# Patient Record
Sex: FEMALE | Race: OTHER | HISPANIC OR LATINO | Employment: UNEMPLOYED | URBAN - METROPOLITAN AREA
[De-identification: names, ages, dates, MRNs, and addresses within clinical notes are randomized per-mention and may not be internally consistent; named-entity substitution may affect disease eponyms.]

---

## 2017-01-11 ENCOUNTER — GENERIC CONVERSION - ENCOUNTER (OUTPATIENT)
Dept: OTHER | Facility: OTHER | Age: 27
End: 2017-01-11

## 2017-01-11 ENCOUNTER — ALLSCRIPTS OFFICE VISIT (OUTPATIENT)
Dept: PERINATAL CARE | Facility: OTHER | Age: 27
End: 2017-01-11
Payer: COMMERCIAL

## 2017-01-11 PROCEDURE — 76817 TRANSVAGINAL US OBSTETRIC: CPT | Performed by: OBSTETRICS & GYNECOLOGY

## 2017-01-11 PROCEDURE — 76815 OB US LIMITED FETUS(S): CPT | Performed by: OBSTETRICS & GYNECOLOGY

## 2017-01-25 ENCOUNTER — GENERIC CONVERSION - ENCOUNTER (OUTPATIENT)
Dept: OTHER | Facility: OTHER | Age: 27
End: 2017-01-25

## 2017-01-25 ENCOUNTER — ALLSCRIPTS OFFICE VISIT (OUTPATIENT)
Dept: PERINATAL CARE | Facility: CLINIC | Age: 27
End: 2017-01-25
Payer: COMMERCIAL

## 2017-01-25 PROCEDURE — 76817 TRANSVAGINAL US OBSTETRIC: CPT | Performed by: OBSTETRICS & GYNECOLOGY

## 2017-01-25 PROCEDURE — 76815 OB US LIMITED FETUS(S): CPT | Performed by: OBSTETRICS & GYNECOLOGY

## 2017-02-08 ENCOUNTER — GENERIC CONVERSION - ENCOUNTER (OUTPATIENT)
Dept: OTHER | Facility: OTHER | Age: 27
End: 2017-02-08

## 2017-02-08 ENCOUNTER — ALLSCRIPTS OFFICE VISIT (OUTPATIENT)
Dept: PERINATAL CARE | Facility: OTHER | Age: 27
End: 2017-02-08
Payer: COMMERCIAL

## 2017-02-08 PROCEDURE — 76805 OB US >/= 14 WKS SNGL FETUS: CPT | Performed by: OBSTETRICS & GYNECOLOGY

## 2017-02-08 PROCEDURE — 76817 TRANSVAGINAL US OBSTETRIC: CPT | Performed by: OBSTETRICS & GYNECOLOGY

## 2017-02-22 ENCOUNTER — GENERIC CONVERSION - ENCOUNTER (OUTPATIENT)
Dept: OTHER | Facility: OTHER | Age: 27
End: 2017-02-22

## 2017-02-22 ENCOUNTER — ALLSCRIPTS OFFICE VISIT (OUTPATIENT)
Dept: PERINATAL CARE | Facility: CLINIC | Age: 27
End: 2017-02-22
Payer: COMMERCIAL

## 2017-02-22 PROCEDURE — 76816 OB US FOLLOW-UP PER FETUS: CPT | Performed by: OBSTETRICS & GYNECOLOGY

## 2017-02-22 PROCEDURE — 76817 TRANSVAGINAL US OBSTETRIC: CPT | Performed by: OBSTETRICS & GYNECOLOGY

## 2017-03-01 ENCOUNTER — GENERIC CONVERSION - ENCOUNTER (OUTPATIENT)
Dept: OTHER | Facility: OTHER | Age: 27
End: 2017-03-01

## 2017-03-01 ENCOUNTER — ALLSCRIPTS OFFICE VISIT (OUTPATIENT)
Dept: PERINATAL CARE | Facility: OTHER | Age: 27
End: 2017-03-01
Payer: COMMERCIAL

## 2017-03-01 PROCEDURE — 76817 TRANSVAGINAL US OBSTETRIC: CPT | Performed by: OBSTETRICS & GYNECOLOGY

## 2017-03-29 ENCOUNTER — ALLSCRIPTS OFFICE VISIT (OUTPATIENT)
Dept: PERINATAL CARE | Facility: OTHER | Age: 27
End: 2017-03-29
Payer: COMMERCIAL

## 2017-03-29 ENCOUNTER — GENERIC CONVERSION - ENCOUNTER (OUTPATIENT)
Dept: OTHER | Facility: OTHER | Age: 27
End: 2017-03-29

## 2017-03-29 PROCEDURE — 76816 OB US FOLLOW-UP PER FETUS: CPT | Performed by: OBSTETRICS & GYNECOLOGY

## 2017-05-10 ENCOUNTER — ALLSCRIPTS OFFICE VISIT (OUTPATIENT)
Dept: PERINATAL CARE | Facility: OTHER | Age: 27
End: 2017-05-10
Payer: COMMERCIAL

## 2017-05-10 ENCOUNTER — GENERIC CONVERSION - ENCOUNTER (OUTPATIENT)
Dept: OTHER | Facility: OTHER | Age: 27
End: 2017-05-10

## 2017-05-10 PROCEDURE — 76816 OB US FOLLOW-UP PER FETUS: CPT | Performed by: OBSTETRICS & GYNECOLOGY

## 2018-01-10 NOTE — PROGRESS NOTES
2016         RE: Raysa Hudson                              To: [address] TEOFILO Ventura    MR#: [id number]                                    [address] [place] [address] Drive   :  [facility], [address]   #: *********                                    Fax: Tacos Weinerh: [phone]   (Exam #: [id number])      The LMP of this 27year old,  G3, P1-1-0-2 patient was SEP 15 2016, giving   her an NATIVIDAD of 2017 and a current gestational age of 11 weeks 6 days   by dates  A sonographic examination was performed on 2016 using   real time equipment  The ultrasound examination was performed using   abdominal & vaginal techniques  The patient has a BMI of 28 3  Her blood   pressure today was 119/67  Earliest ultrasound found in her record: 16 8w4d  17 NATIVIDAD   Lamar reports she is on prenatal vitamins and reports she develops a   rash when she takes Demerol  She denies any past medical or surgical   history or the use of any cigarettes, alcohol or illicit drugs  In her MFM   consult in her last pregnancy she reported she had an abnormal Pap smear   in  and herpes diagnosed in   She's had 2 prior pregnancies that   have had complications  Her first son delivered in [facility] at 32 weeks   24 hours after PPROM  She required a  section but she is unsure   why and that baby required a 30 day NICU stay and then developed a Wilms   tumor at 21 months of age which had to be removed  She states he is   currently healthy  Her second son delivered at 42 weeks by repeat     In that pregnancy her son was diagnosed with IUGR with a normal   ANDRE and dopplers at 37w1d and delivery was recommended  She was induced   only to require a CS for nonreassuring fetal testing  This child Ciro Patel 12/[birth year] Is followed by Dr Ken Jo in the pediatric   Department here at [facility]   He is being followed for renal tubular   acidosis and he has a difficult time feeding so he has a gastrostomy tube  He had a formal consult at [facility] and his mother is waiting on the final   results  Her family history is significant for diabetes in her maternal   and paternal grandmothers  She denies any family history of hypertension   or thrombosis  I utilized Sharyn to help in the interpretation during this   consult  Multiple longitudinal and transverse sections revealed a rubalcava   intrauterine pregnancy  A normal gestational sac was documented  A normal fetal pole was   visualized  Cardiac motion was observed at 169 bpm  The yolk sac was seen,   measuring 0 20 cm  INDICATIONS      confirm gestational age   viability   Previous C/S x 2   short interval pregnancy      Exam Types      Level I      MEASUREMENTS (* Included In Average GA)      CRL              2 0 cm        8 weeks 4 days *      THE AVERAGE GESTATIONAL AGE is 8 weeks 4 days +/- 5 days  ANATOMY COMMENTS      Anatomic detail is extremely limited at this gestational age  However, a   discrete fetal pole with cardiac and fetal body motion was documented  The yolk sac was clearly seen, and the gestational sac is normal in   appearance and located in the fundus of the uterus  No gross abnormalities   were noted on this examination  Free fluid is not seen in the posterior   cul-de-sac  There is no suspicion of a subchorionic hematoma  IMPRESSION      Rubalcava IUP   8 weeks and 4 days by this ultrasound  (NATIVIDAD=2017)   Regular fetal heart rate of 169 bpm      GENERAL COMMENT      OFFICE CONSULT      As per your request, a consultation was performed on your patient for the   following indication: prior  delivery,  prior child with Wilms   tumor and a prior child with renal tubular acidosis that developed   postnatally and is being followed at [facility] and [facility]  Patient also   has two prior C-sections        The patient was informed of the findings and counseled about the   limitations of the exam in detecting all forms of fetal congenital   abnormalities  She denies any vaginal bleeding or uterine cramping/contractions  Follow up recommended:   1  Recommend offering [employer] again in this pregnancy  2  Recommend offering transvaginal scans for serial cervix lengths between   16 and 24 weeks and these can be repeated every 2 weeks  3  Recommend an anatomy scan at 20 weeks and growth scans at 28 and 34   weeks  4  She has follow-up for her first OB visit where she will discuss with   her OB provider about the options for genetic screening  5  Recommend obtaining the report from [facility] on her son when available to   see if there is a family syndrome that is associated with the complicated   histories of her prior children that includes the Wilms Tumor in her first   son and the Renal tubular acidosis in her second son  The majority of time (greater then 50%) was spent on counseling and   coordination of care of this patient and /or family member  Approximate   face to face time was 30 minutes  Rachel Goodpasture, R D M S Alice Husky, M D     Maternal-Fetal Medicine   Electronically signed 11/17/16 13:48

## 2018-01-11 NOTE — PROGRESS NOTES
2017         RE: Yasmin Mclain                              To: [address] TEOFILO Duenas    MR#: [id number]                                    [address] [place] [address] Drive   :  [place] [address]   ENC: [phone]                             Fax: [phone]   (Exam #: [id number])      The LMP of this 27year old,  G3, P1-1-0-2 patient was SEP 13 2016, giving   her an NATIVIDAD of 2017 and a current gestational age of 25 weeks 6 days   by dates  A sonographic examination was performed on 2017 using   real time equipment  The ultrasound examination was performed using   abdominal & vaginal techniques  The patient has a BMI of 27 8  Her blood   pressure today was 104/67  Earliest ultrasound found in her record: 16 8w4d  17 NATIVIDAD      Cardiac motion was observed at 147 bpm       INDICATIONS      previous  delivery   missed anatomy follow up   previous IUGR      Exam Types      Transvaginal      RESULTS      Fetus # 1 of 1   Breech presentation   Placenta Location = Anterior   No placenta previa   Placenta Grade = I      AMNIOTIC FLUID         Largest Vertical Pocket = 5 4 cm   Amniotic Fluid: Normal      CERVICAL EVALUATION      The cervix appeared normal (Ultrasound Examination)  SUPINE      Cervical Length: 2 67 cm      OTHER TEST RESULTS           Funneling?: No             Dynamic Changes?: No        Resp  To TFP?: No      ANATOMY DETAILS      Visualized Appearing Sonographically Normal:   HEART: (Four Chamber View, Proximal Left Outflow, Proximal Right Outflow,   Short Axis of Greater Vessels, Ductal Arch, Cardiac Axis, Cardiac Position)      ANATOMY COMMENTS      The prior fetal anatomic survey was limited the area of the short axis and   ductal arch  These anatomic views were seen today as sonographically   normal within the inherent limitations of fetal ultrasound        IMPRESSION      Rubalcava IUP   Breech presentation   Regular fetal heart rate of 147 bpm   Anterior placenta   No placenta previa      GENERAL COMMENT      On exam, the patient appears well, in no acute distress, and her abdomen   is nontender  A transvaginal ultrasound was performed to assess the cervix, which was   not seen well transabdominally  The cervical length was 2 67 centimeters,   which is normal for the current gestational age  There was no significant   funneling or dynamic changes appreciated  Given that the cervical length   is less than 3 cm, I recommend the patient return in one week for repeat   cervical length evaluation  If at that point, the cervical length is less   than 2 5 cm, cervical cerclage is recommended  Thank you very much for allowing us to participate in the care of this   very nice patient  Should you have any questions, please do not hesitate   to contact our office  Please note, in addition to the time spent discussing the results of the   ultrasound, I spent approximately 10 minutes of face-to-face time with the   patient, greater than 50% of which was spent in counseling and the   coordination of care for this patient  Portions of the record may have been created with voice recognition   software  Occasional wrong word or "sound a like" substitutions may have   occurred due to the inherent limitations of voice recognition software  Read the chart carefully and recognize, using context, where substitutions   have occurred  WENDY Hernandez M D     Electronically signed 02/22/17 11:33

## 2018-01-12 VITALS
SYSTOLIC BLOOD PRESSURE: 104 MMHG | WEIGHT: 147 LBS | HEIGHT: 61 IN | BODY MASS INDEX: 27.75 KG/M2 | DIASTOLIC BLOOD PRESSURE: 67 MMHG

## 2018-01-12 NOTE — PROGRESS NOTES
MAY 10 2017         RE: Moises Laughlin                              To: TEOFILO Rooney    MR#: [id number]                                    [address]   : [facility], [address]   SS#: *********                                    Fax: Natali Roberts: [phone]   (Exam #: [id number])      The LMP of this 27year old,  G3, P1-1-0-2 patient was SEP 15 2016, giving   her an NATIVIDAD of 2017 and a current gestational age of 29 weeks 6 days   by dates  A sonographic examination was performed on MAY 10 2017 using   real time equipment  The ultrasound examination was performed using   abdominal technique  The patient has a BMI of 29 9  Her blood pressure   today was 105/64  Earliest ultrasound found in her record: 16 8w4d  17 NATIVIDAD      Lamar has no complaints today  She reports regular fetal movements and   denies problems related to hypertension, gestational diabetes,    labor, or vaginal bleeding        Cardiac motion was observed at 139 bpm       INDICATIONS      previous  delivery   fetal growth   short interval pregnancy      Exam Types      Level I      RESULTS      Fetus # 1 of 1   Vertex presentation   Placenta Location = Anterior   No placenta previa   Placenta Grade = II      MEASUREMENTS (* Included In Average GA)      AC              29 5 cm        33 weeks 4 days* (43%)   BPD              7 6 cm        30 weeks 4 days* (<5%)   HC              29 6 cm        32 weeks 3 days* (9%)   Femur            5 8 cm        30 weeks 1 day * (<5%)      Humerus          5 3 cm        30 weeks 5 days  (<5%)   Radius           4 3 cm        31 weeks 3 days   Ulna             4 8 cm        30 weeks 4 days   Tibia            5 0 cm        29 weeks 6 days  (<5%)   Fibula           5 0 cm        29 weeks 1 day      Cerebellum       4 4 cm        35 weeks 1 day      HC/AC           1 01   FL/AC 0 20   FL/BPD          0 76   EFW (Ac/Fl/Hc)  1947 grams - 4 lbs 5 oz                 (21%)      THE AVERAGE GESTATIONAL AGE is 31 weeks 4 days +/- 18 days  AMNIOTIC FLUID      Q1: 3 7      Q2: 6 6      Q3: 3 0      Q4: 3 8   ANDRE Total = 17 2 cm   Amniotic Fluid: Normal      ANATOMY DETAILS      Visualized Appearing Sonographically Normal:   HEAD: (Calvarium, BPD Level, Lateral Ventricles, Cerebellum);      FACE/NECK: (Nose/Lips);    TH  CAV : (Diaphragm); HEART: ([employer], Cardiac Axis, Cardiac Position);    STOMACH, RIGHT KIDNEY, LEFT   KIDNEY, BLADDER, PLACENTA      Visualized:   HEART: (Proximal Left Outflow, Proximal Right Outflow)      Not Visualized:   HEAD: (Cavum)      IMPRESSION      Rubalcava IUP   31 weeks and 4 days by this ultrasound  (NATIVIDAD=JUL 8 2017)   Vertex presentation   Regular fetal heart rate of 139 bpm   Anterior placenta   No placenta previa      GENERAL COMMENT      No fetal structural abnormality is identified on the Level I survey today  The fetal long bone measurements are mildly shortened, though the bone   densities and angulations appear normal  The overall estimated fetal   weight is normal for this gestational age  The placenta and amniotic fluid   volume appear normal       Today's ultrasound findings and suggested follow-up were discussed in   detail with aLmar  Most likely, the mildly shortened long bones   represent normal genetic growth variation, though a non-lethal skeletal   dysplasia cannot be ruled out with certainty  She will return to the   [facility]  at [facility] in 4 weeks for a final   assessment of interval growth  Daily third trimester fetal kick counting   was discussed at the visit today  The face to face time, in addition to time spent discussing ultrasound   results, was approximately 10 minutes, greater than 50% of which was spent   during counseling and coordination of care        Almyra Sandifer, R D M S  TEOFILO Saravia     Maternal-Fetal Medicine   Electronically signed 05/10/17 11:09

## 2018-01-13 VITALS
HEIGHT: 61 IN | BODY MASS INDEX: 28.51 KG/M2 | SYSTOLIC BLOOD PRESSURE: 105 MMHG | WEIGHT: 151 LBS | DIASTOLIC BLOOD PRESSURE: 57 MMHG

## 2018-01-13 VITALS
DIASTOLIC BLOOD PRESSURE: 64 MMHG | HEIGHT: 61 IN | WEIGHT: 158.6 LBS | SYSTOLIC BLOOD PRESSURE: 105 MMHG | BODY MASS INDEX: 29.94 KG/M2

## 2018-01-13 VITALS
WEIGHT: 144 LBS | HEIGHT: 60 IN | DIASTOLIC BLOOD PRESSURE: 53 MMHG | SYSTOLIC BLOOD PRESSURE: 100 MMHG | BODY MASS INDEX: 28.27 KG/M2

## 2018-01-13 NOTE — PROGRESS NOTES
MAR 1 2017         RE: Maurice Benitez                              To: TEOFILO Hernandez    MR#: [id number]                                    [address]   : AUG [address] [facility]   #: *********                                    Fax: Jhoan Ny: [phone]   (Exam #: [id number])      The LMP of this 27year old,  G3, P1-1-0-2 patient was SEP 15 2016, giving   her an NATIVIDAD of 2017 and a current gestational age of 20 weeks 6 days   by dates  A sonographic examination was performed on MAR 1 2017 using real   time equipment  The ultrasound examination was performed using abdominal &   vaginal techniques  The patient has a BMI of 28 3  Her blood pressure   today was 111/69  Earliest ultrasound found in her record: 16 8w4d  17 NATIVIDAD      Cardiac motion was observed at 135 bpm       INDICATIONS      previous  delivery      Exam Types      Transvaginal      RESULTS      Fetus # 1 of 1   Breech presentation   Placenta Location = Anterior   No placenta previa   Placenta Grade = I      AMNIOTIC FLUID         Largest Vertical Pocket = 6 0 cm   Amniotic Fluid: Normal      CERVICAL EVALUATION      The cervix appeared normal (Ultrasound Examination)  SUPINE      Cervical Length: 2 80 cm      OTHER TEST RESULTS           Funneling?: No             Dynamic Changes?: No        Resp  To TFP?: No                      Debris?: No      IMPRESSION      Rubalcava IUP   Breech presentation   Regular fetal heart rate of 135 bpm   Anterior placenta   No placenta previa      GENERAL COMMENT      The cervix is normal in appearance by transvaginal sonography  The   cervical length is normal   Cervical debris is not present  Cervical   funneling is not present  Neither provocative nor dynamic change is   appreciated        RECOMMENDATION      Transvaginal cervical length: As indicated   Growth Ultrasound: 4 Weeks WENDY Silverman M D     Maternal-Fetal Medicine   Electronically signed 03/01/17 11:02

## 2018-01-14 VITALS
DIASTOLIC BLOOD PRESSURE: 69 MMHG | BODY MASS INDEX: 28.32 KG/M2 | WEIGHT: 150.03 LBS | SYSTOLIC BLOOD PRESSURE: 111 MMHG | HEIGHT: 61 IN

## 2018-01-14 VITALS
BODY MASS INDEX: 27.76 KG/M2 | HEIGHT: 60 IN | SYSTOLIC BLOOD PRESSURE: 102 MMHG | WEIGHT: 141.4 LBS | DIASTOLIC BLOOD PRESSURE: 54 MMHG

## 2018-01-14 VITALS
WEIGHT: 145.03 LBS | SYSTOLIC BLOOD PRESSURE: 117 MMHG | DIASTOLIC BLOOD PRESSURE: 67 MMHG | HEIGHT: 61 IN | BODY MASS INDEX: 27.38 KG/M2

## 2018-01-15 NOTE — PROGRESS NOTES
2017         RE: Yolanda New                              To: All Oneal, M D    MR#: [id number]                                    [address]   : A[facility] [address], [address]   ENC: [phone]                             Fax: [phone]   (Exam #: [id number])      The LMP of this 27year old,  G3, P1-1-0-2 patient was SEP 13 2016, giving   her an NATIVIDAD of 2017 and a current gestational age of 22 weeks 6 days   by dates  A sonographic examination was performed on 2017 using   real time equipment  The ultrasound examination was performed using   abdominal & vaginal techniques  The patient has a BMI of 28 1  Her blood   pressure today was 100/53  Earliest ultrasound found in her record: 16 8w4d  17 NATIVIDAD      Cardiac motion was observed at 148 bpm       INDICATIONS      previous  delivery      Exam Types      Transvaginal      RESULTS      Fetus # 1 of 1   Transverse presentation   Placenta Location = Anterior   No placenta previa   Placenta Grade = I      AMNIOTIC FLUID         Largest Vertical Pocket = 5 0 cm   Amniotic Fluid: Normal      CERVICAL EVALUATION      The cervix appeared normal (Ultrasound Examination)  SUPINE      Cervical Length: 3 50 cm      OTHER TEST RESULTS           Funneling?: No             Dynamic Changes?: No        Resp  To TFP?: No      IMPRESSION      Rubalcava IUP   Transverse presentation   Regular fetal heart rate of 148 bpm   Anterior placenta   No placenta previa      GENERAL COMMENT      On exam, the patient appears well, in no acute distress, and her abdomen   is nontender  A transvaginal ultrasound was performed to assess the cervix, which was   not seen well transabdominally  The cervical length was 3 5 centimeters,   which is normal for the current gestational age  There was no significant   funneling or dynamic changes appreciated        She is scheduled to return in 2 weeks for a fetal anatomic survey and   cervical length screening  Continues to utilize 17 alpha   hydroxyprogesterone in the office  Thank you very much for allowing us to participate in the care of this   very nice patient  Should you have any questions, please do not hesitate   to contact our office  WENDY Romero M D     Electronically signed 01/25/17 10:47

## 2018-01-15 NOTE — PROGRESS NOTES
MAR 29 2017         RE: Emma Negron                              To: TEOFILO Cadet    MR#: [id number]                                    [address]   : AUG [address] [facility] [address]   SS#: *********                                    Fax: Risa Millin:RBNJS   (Exam #: [id number])      The LMP of this 27year old,  G3, P1-1-0-2 patient was SEP 15 2016, giving   her an NATIVIDAD of 2017 and a current gestational age of 32 weeks 6 days   by dates  A sonographic examination was performed on MAR 29 2017 using   real time equipment  The ultrasound examination was performed using   abdominal technique  The patient has a BMI of 28 5  Her blood pressure   today was 105/57        Earliest ultrasound found in her record: 16 8w4d  17 NATIVIDAD      Cardiac motion was observed at 130 bpm       INDICATIONS      previous  delivery   fetal growth   short interval pregnancy      Exam Types      Level I      RESULTS      Fetus # 1 of 1   Vertex presentation   Fetal growth appeared normal   Placenta Location = Anterior   Placenta Grade = I      MEASUREMENTS (* Included In Average GA)      AC              22 2 cm        26 weeks 4 days* (21%)   BPD              6 9 cm        27 weeks 6 days* (39%)   HC              25 9 cm        27 weeks 6 days* (36%)   Femur            4 7 cm        25 weeks 5 days* (<5%)      Humerus          4 3 cm        25 weeks 4 days  (<5%)   Radius           3 5 cm        25 weeks 5 days   Ulna             4 0 cm        25 weeks 6 days   Tibia            4 0 cm        25 weeks 2 days  (<5%)   Fibula           4 1 cm        24 weeks 5 days      Cerebellum       3 3 cm        29 weeks 5 days      HC/AC           1 17   FL/AC           0 21   FL/BPD          0 68   Ceph Index      0 75   EFW (Ac/Fl/Hc)   941 grams - 2 lbs 1 oz                 (28%)      THE AVERAGE GESTATIONAL AGE is 27 weeks 0 days +/- 14 days       AMNIOTIC FLUID      Q1: 3 4      Q2: 4 2      Q3: 3 7      Q4: 3 0   ANDRE Total = 14 3 cm      ANATOMY DETAILS      Visualized Appearing Sonographically Normal:   HEAD: (Calvarium, BPD Level, Cavum, Lateral Ventricles, Choroid Plexus,   Cerebellum, Cisterna Magna);    TH  CAV  : (Lungs, Diaphragm); HEART:   (Four Chamber View, Proximal Right Outflow, 3VV, Interventricular Septum,   Interatrial Septum, Cardiac Axis, Cardiac Position);    STOMACH, RIGHT   KIDNEY, LEFT KIDNEY, BLADDER, PLACENTA      Visualized:   HEART: (Proximal Left Outflow)      IMPRESSION      Rubalcava IUP   27 weeks and 0 days by this ultrasound  (NATIVIDAD=2017)   Vertex presentation   Fetal growth appeared normal   Regular fetal heart rate of 130 bpm   Anterior placenta      GENERAL COMMENT      On exam today the patient appears well, in no acute distress, and denies   any complaints  Her abdomen is non-tender  There has been appropriate interval fetal growth  The extremities appear   to be somewhat shortened approximately 2 weeks behind estimated   gestational age  The patient herself short and her prior full-term   delivery was a small baby who weighed 4-1/2 pounds  This is likely   consistent with constitutional growth however follow-up growth ultrasound   are recommended  The echogenicity and structure of the bones appear to be   normal   Good fetal movement and tone are seen  The amniotic fluid volume   appears normal   The placenta is anterior and it appears sonographically   normal   The anatomic structures listed above could not be optimally   imaged today because of fetal position; however, these structures were   seen on a prior ultrasound and appeared sonographically normal   The   patient was informed of today's findings and all of her questions were   answered  The limitations of ultrasound were reviewed with the patient   labor precautions and fetal kick counts were reviewed        We discussed appropriate follow-up in detail and I recommend the patient   return      Thank you very much for allowing us to participate in the care of this   very nice patient  Should you have any questions, please do not hesitate   to contact our office  Please note, in addition to the time spent discussing the results of the   ultrasound, I spent approximately 10 minutes of face-to-face time with the   patient, greater than 50% of which was spent in counseling and the   coordination of care for this patient  WENDY Caraballo M D     Electronically signed 03/29/17 13:47

## 2018-01-16 NOTE — PROGRESS NOTES
2017         RE: Michelle Mcintyre                              To: TEOFILO Castro    MR#: [id number]                                    [address]   : AUG [address] [facility]  [address]   #: *********                                    Fax: Jesse Mariar: [phone]   (Exam #: [id number])      The LMP of this 27year old,  G3, P1-1-0-2 patient was SEP 15 2016, giving   her an NATIVIDAD of 2017 and a current gestational age of 12 weeks 6 days   by dates  A sonographic examination was performed on 2017 using   real time equipment  The ultrasound examination was performed using   abdominal & vaginal techniques  The patient has a BMI of 27 5  Her blood   pressure today was 102/54  Earliest ultrasound found in her record: 16 8w4d  17 NATIVIDAD      Nidachy has no complaints  She denies abdominal or pelvic pain, vaginal   bleeding, mucoid vaginal discharge, or suspected PPROM  Cardiac motion was observed at 144 bpm       INDICATIONS      previous  delivery      Exam Types      Transvaginal      RESULTS      Fetus # 1 of 1   Breech presentation   Placenta Location = Anterior   No placenta previa   Placenta Grade = 0      AMNIOTIC FLUID         Largest Vertical Pocket = 3 8 cm   Amniotic Fluid: Normal      CERVICAL EVALUATION      The cervix appeared normal (Ultrasound Examination)  SUPINE      Cervical Length: 3 20 cm      OTHER TEST RESULTS           Funneling?: No             Dynamic Changes?: No        Resp  To TFP?: No                      Debris?: No      IMPRESSION      Rubalcava IUP   Breech presentation   Regular fetal heart rate of 144 bpm   Anterior placenta   No placenta previa      GENERAL COMMENT      The cervix is normal in appearance by transvaginal sonography  The   cervical length is normal   Cervical debris is not present  Cervical   funneling is not present    Neither provocative nor dynamic change is   appreciated  Today's ultrasound findings and suggested follow-up were discussed in   detail with Lamar  She will be followed in the [address] [place] [address] with serial transvaginal ultrasound evaluations,   every 2 weeks, until 24 weeks gestation for cervical surveillance given   her history of a prior spontaneous  birth  Cervical cerclage is   recommended with cervical shortening below 25 mm prior to 24 weeks   gestation  Level II ultrasound evaluation will be performed at 20 weeks   gestation  Weekly IM 17-OHPC, 250 mg, is recommended which will reduce her   risk for recurrence of spontaneous  birth by about 30%  Continuation of weekly 17-OHPC is recommended through 36 completed weeks   gestation  The face to face time, in addition to time spent discussing ultrasound   results, was approximately 10 minutes, greater than 50% of which was spent   during counseling and coordination of care  WENDY Edouard M D     Maternal-Fetal Medicine   Electronically signed 17 11:07

## 2018-01-18 NOTE — PROGRESS NOTES
2017         RE: Raysa Hudson                              To: TEOFILO Avalos    MR#: [id number]                                    [address]   :  [facility] [address]   #: *********                                    Fax: Tacos Perch: [phone]:[id number]   (Exam #: [id number])      The LMP of this 27year old,  G3, P1-1-0-2 patient was SEP 15 2016, giving   her an NATIVIDAD of 2017 and a current gestational age of 25 weeks 6 days   by dates  A sonographic examination was performed on 2017 using real   time equipment  The ultrasound examination was performed using abdominal &   vaginal techniques  The patient has a BMI of 27 4  Her blood pressure   today was 117/67  Earliest ultrasound found in her record: 16 8w4d  17 NATIVIDAD      Lamar has no complaints  She reports fetal movements  Narcisa Coronel is   currently treated with weekly IM progesterone given her history of a prior   spontaneous  birth  She denies abdominal or pelvic pain, vaginal   bleeding, mucoid vaginal discharge, or suspected PPROM        Cardiac motion was observed at 145 bpm       INDICATIONS      previous  delivery   fetal anatomical survey   previous IUGR      Exam Types      Transvaginal   LEVEL II      RESULTS      Fetus # 1 of 1   Vertex presentation   Fetal growth appeared normal   Placenta Location = Anterior   No placenta previa   Placenta Grade = I      MEASUREMENTS (* Included In Average GA)      AC              14 8 cm        19 weeks 5 days* (28%)   BPD              4 7 cm        20 weeks 1 day * (31%)   HC              18 1 cm        20 weeks 3 days* (34%)   Femur            3 2 cm        20 weeks 1 day * (28%)      Nuchal Fold      3 9 mm   NBL              7 0 mm      Humerus          3 2 cm        20 weeks 4 days  (39%)      Cerebellum       2 2 cm        21 weeks 6 days   Biorbit          3 4 cm        21 weeks 4 days   CisternaMagna    4 8 mm      HC/AC           1 22   FL/AC           0 22   FL/BPD          0 68   EFW (Ac/Fl/Hc)   328 grams - 0 lbs 12 oz      THE AVERAGE GESTATIONAL AGE is 20 weeks 1 day +/- 10 days  AMNIOTIC FLUID         Largest Vertical Pocket = 4 4 cm   Amniotic Fluid: Normal      CERVICAL EVALUATION      The cervix appeared normal (Ultrasound Examination)  SUPINE      Cervical Length: 3 40 cm      OTHER TEST RESULTS           Funneling?: No             Dynamic Changes?: No        Resp  To TFP?: No                      Debris?: No      ANATOMY      Head                                    Normal   Face/Neck                               Normal   Th  Cav  Normal   Heart                                   See Details   Abd  Cav  Normal   Stomach                                 Normal   Right Kidney                            Normal   Left Kidney                             Normal   Bladder                                 Normal   Abd  Wall                               Normal   Spine                                   Normal   Extrems                                 Normal   Genitalia                               Normal   Placenta                                Normal   Umbl  Cord                              Normal   Uterus                                  Normal   PCI                                     Normal      ANATOMY DETAILS      Visualized Appearing Sonographically Normal:   HEAD: (Calvarium, BPD Level, Cavum, Lateral Ventricles, Choroid Plexus,   Cerebellum, Cisterna Magna);    FACE/NECK: (Neck, Nuchal Fold, Profile,   Orbits, Nose/Lips, Palate, Face);    TH  CAV  : (Lungs, Diaphragm);       HEART: (Four Chamber View, Proximal Left Outflow, Proximal Right Outflow,   3VV, 3 Vessel Trachea, Aortic Arch, Interventricular Septum, Interatrial   Septum, IVC, SVC, Cardiac Axis, Cardiac Position);    ABD  CAV : (Liver); STOMACH, RIGHT KIDNEY, LEFT KIDNEY, BLADDER, ABD  WALL, SPINE: (Cervical   Spine, Thoracic Spine, Lumbar Spine, Sacrum);    EXTREMS: (Lt Humerus, Rt   Humerus, Lt Forearm, Rt Forearm, Lt Hand, Rt Hand, Lt Femur, Rt Femur, Lt   Low Leg, Rt Low Leg, Lt Foot, Rt Foot);    GENITALIA, PLACENTA, UMBL  CORD, UTERUS, PCI      Not Visualized:   HEART: (Short Axis of Greater Vessels, Ductal Arch)      ADNEXA      The left ovary appeared normal and measured 2 1 x 1 6 x 1 7 cm with a   volume of 3 0 cc  The right ovary appeared normal and measured 1 7 x 2 1 x   1 7 cm with a volume of 3 2 cc  IMPRESSION      Rubalcava IUP   20 weeks and 1 day by this ultrasound  (NATIVIDAD=JUN 27 2017)   Vertex presentation   Fetal growth appeared normal   Regular fetal heart rate of 145 bpm   Anterior placenta   No placenta previa      GENERAL COMMENT      No fetal structural abnormality or ultrasound marker for aneuploidy is   identified on the Level II ultrasound study today  The cardiac short axis   view of the great vessels and ductal arch are suboptimally imaged   secondary to the constraints related to unfavorable fetal position  Fetal   growth and amniotic fluid volume are normal   The placenta is normal in   appearance  The cervix is normal in appearance by transvaginal sonography  The   cervical length is normal   Cervical debris is not present  Cervical   funneling is not present  Neither provocative nor dynamic change is   appreciated  Today's ultrasound findings and suggested follow-up were discussed in   detail with Lamar  Prenatal ultrasound cannot rule out all congenital   abnormalities  Stephani Ross will return to the [address] [place] [address] at [facility] in 2 weeks for cervical sonography  Cervical cerclage is   recommended with cervical shortening below 25 mm prior to 24 weeks   gestation   Fetal growth will be reassessed in the [address] [place] [address] at 28   weeks gestation given her history of a prior growth restricted baby  Continuation of weekly 17-OHPC is recommended through 36 completed weeks   gestation  The face to face time, in addition to time spent discussing ultrasound   results, was approximately 10 minutes, greater than 50% of which was spent   during counseling and coordination of care  WENDY Mosquera M D     Maternal-Fetal Medicine   Electronically signed 02/08/17 11:37

## 2018-05-18 LAB
CANDIDA ANTIGEN DETECTION (HISTORICAL): ABNORMAL
GARDNERELLA VAGINALIS (HISTORICAL): DETECTED
TRICHOMONAS (HISTORICAL): DETECTED

## 2018-05-22 LAB
CHLAMYDIA TRACHOMATIS DNA SDA,GENITAL (HISTORICAL): DETECTED
Lab: NORMAL
N GONORRHOEAE DNA SDA,GENITAL (HISTORICAL): ABNORMAL

## 2018-06-18 ENCOUNTER — OFFICE VISIT (OUTPATIENT)
Dept: OBGYN CLINIC | Facility: HOSPITAL | Age: 28
End: 2018-06-18
Payer: COMMERCIAL

## 2018-06-18 VITALS
BODY MASS INDEX: 27.34 KG/M2 | SYSTOLIC BLOOD PRESSURE: 113 MMHG | WEIGHT: 148.6 LBS | HEART RATE: 68 BPM | DIASTOLIC BLOOD PRESSURE: 68 MMHG | HEIGHT: 62 IN

## 2018-06-18 DIAGNOSIS — Z30.09 ENCOUNTER FOR COUNSELING REGARDING CONTRACEPTION: Primary | ICD-10-CM

## 2018-06-18 LAB — SL AMB POCT URINE HCG: NORMAL

## 2018-06-18 PROCEDURE — 99203 OFFICE O/P NEW LOW 30 MIN: CPT | Performed by: OBSTETRICS & GYNECOLOGY

## 2018-06-18 PROCEDURE — 81025 URINE PREGNANCY TEST: CPT | Performed by: OBSTETRICS & GYNECOLOGY

## 2018-06-18 NOTE — PATIENT INSTRUCTIONS
Etonogestrel (Implante)   Roxie un embarazo  Roman(s) : Nexplanon   Existen muchas otras marcas de justine medicamento  Justine medicamento no debe ser usado cuando:   Justine medicamento no es adecuado para todas las personas  No lo use si usted ha tenido lisa reacción alérgica al etonogestrel o si está embarazada  No lo use si usted tiene cáncer de seno, enfermedad cardíaca, enfermedad hepática, o antecedentes de coágulos sanguíneos (erika trombosis venosa profunda, embolia pulmonar o derrame cerebral)  Forma de usar justine medicamento:   Implante  · Lisa enfermera o un paramédico entrenado le aplicará barnes medicamento  · Justine medicamento es un implante  Se coloca quirúrgicamente bajo la piel de barnes brazo superior, interior  · Presione suavemente con las puntas de thelma dedos sobre la piel donde le colocaron el medicamento  Usted debe poder sentir el implante  · Es posible que deba usar otro método anticonceptivo kalyan 7 días después de que se inserte el implante  Barnes médico le dirá si esto es necesario  · Barnes médico puede extraer el implante en cualquier momento si desea dejar de usar [employer]  El implante debe ser removido después de 3 años, imer usted puede tener chastity nuevo insertado si todavía quiere usar justine método anticonceptivo  · Jenny y siga las instrucciones para el paciente que vienen con el medicamento  Hable con barnes médico o farmacéutico si tiene alguna pregunta  Medicamentos y [employer] que debe evitar:   Consulte con barnes médico o farmacéutico antes de usar cualquier medicamento, incluyendo los que compra sin receta médica, las vitaminas y los productos herbales  · Algunos alimentos y medicamentos pueden afectar la efectividad de etonogestrel   Informe a barnes médico si está usando cualquiera de los siguientes:  ¨ Bosentan, carbamazepina, ciclosporina, felbamato, griseofulvina, itraconazol, ketoconazol, lamotrigina, oxcarbazepina, fenobarbital, fenitoína, rifampina, hierba [address] [place] [address], topiramato  ¨ Medicamento para el VIH / SIDA  Precauciones kalyan el uso de justine medicamento:   · Informe a barnes médico de inmediato si usted ruth estar Glee Bye  El implante tendrá que ser retirado  · Informe a barnes médico si usted tiene cáncer, problemas de circulación sanguínea, presión arterial irene o enfermedad del riñón, o si usted fuma  Informe a barnes médico si usted está dando de lactar o si usted ha dado a jose recientemente  También informe a barnes médico si usted tiene diabetes o prediabetes, colesterol alto, o un historial de depresión  · Justine medicamento puede provocarle los siguientes problemas:  ¨ Embarazo ectópico (en las trompas)  ¨ Quistes en los ovarios  ¨ Posible riesgo de cáncer de seno  ¨ Un riesgo más alto de ataque al corazón, derrame cerebral o coágulos de sean  ¨ Cáncer de hígado o tumores  ¨ Presión arterial irene  · Justine medicamento puede cambiar barnes ciclo menstrual normal  Es posible que tenga sangrado irregular o thelma periodos pueden ser [place] ligeros, más cortos, más pesados o más largos  Puede que no tenga un periodo en algunos ciclos  Sin embargo, llame a barnes médico si ruth que está embarazada o si tiene dolor severo o cambios que le preocupan  · Justine medicamento no la protegerá contra el VIH / SIDA u otras enfermedades de transmisión sexual   · Podría ser necesario retirarle el implante si va a estar inactiva kalyan un período de Beena, por ejemplo después de [place] mayor, debido al riesgo de coágulos de Peyton  · Dígale a todo médico o dentista encargado de atenderle que usted está usando [employer]  Puede que justine medicamento afecte algunos resultados de [employer]  · Barnes médico tendrá que revisar barnes progreso y los efectos de justine medicamento kalyan thelma citas regulares  Cumpla sin falta con todas thelma citas médicas  Asista a todas thelma citas  · Guarde todos los medicamentos fuera del alcance de los niños  Nunca comparta thelma medicamentos con [employer]    Efectos secundarios que pueden presentarse kalyan el uso de deepak medicamento:   Consulte inmediatamente con el médico si nota cualquiera de estos efectos secundarios:  · Reacción alérgica: Comezón o ronchas, hinchazón del amanda o las leoncio, hinchazón u hormigueo en la boca o garganta, opresión en el pecho, dificultad para respirar  · Dolor en el pecho, dificultad para respirar, o toser sean  · [employer] o heces pálidas, náuseas, vómitos, falta de apetito, dolor estomacal, coloración amarillenta en la piel u ojos  · Visión doble u otros problemas para jignesh  · Adormecimiento o debilidad en un lado del cuerpo, dolor de oscar repentino o severo, problemas con el habla, la visión o para caminar  · Dolor en la parte inferior de la pierna (pantorrilla)  · Dolor severo o continuo, hormigueo, sangrado, moretones, enrojecimiento, picazón o inflamación donde se coloca el implante  · Dolor inusual o severo en el abdomen  · Sangrado vaginal o sangrado profuso inusual e inesperado  Consulte con el médico si nota los siguientes efectos secundarios menos graves:   · Acné o espinillas  · Dolor de oscar moderado  · Dolor leve, hormigueo, sangrado, moretones, enrojecimiento, picazón o inflamación donde se colocó el implante  · Cambios en el estado de ánimo  · Aumento de peso  Consulte con el médico si nota otros efectos secundarios que ruth son causados por deepak medicamento  Llame a ramon médico para consultarle Deneen Bejarano puede notificar thelma efectos secundarios al FDA al [phone]-[phone]  © 2017 [address] Information is for End User's use only and may not be sold, redistributed or otherwise used for commercial purposes  Esta información es sólo para uso en educación  Ramon intención no es darle un consejo médico sobre enfermedades o tratamientos  Colsulte con ramon Suzan Kind farmacéutico antes de seguir cualquier régimen médico para saber si es seguro y efectivo para usted

## 2018-06-18 NOTE — PROGRESS NOTES
Subjective     Morena Harding is a 24 y o  [id number] female who presents for contraception counseling  LMP 6/12-6/14/18  She reports that she had a nexplanon placed in 2017 and removed within 4 months in 2/2018 as she desired pregnancy with her boyfriend  However, she is no longer involved with him and does not desire pregnancy in the next 5 years  She would like the nexplanon  She was amenorrheic and had no side effects while on the nexplanon  Last pap smear unknown  Patient to schedule for annual   Periods are irregular, every 28-32 days, lasting 3-5 days  Denies dysmenorrhea or menorrhagia  Endorses past history of STD/STI  H/o chlamydia in 5/2018 at [facility], JORY negative  Denies any changes in bowel/bladder habits, pelvic pain, bloating, abdominal pain, N/V, changes in appetite, thyroid disease  Denies history of GYN issues  Denies history of diabetes, HTN, migraine with aura, or blood clots  Denies history of smoking  Denies family history or personal history of blood clots or bleeding disorders  Review of Systems  Pertinent items are noted in HPI  Objective      /68 (BP Location: Right arm, Patient Position: Sitting, Cuff Size: Standard)   Pulse 68   Ht 5' 1 5" (1 562 m)   Wt 67 4 kg (148 lb 9 6 oz)   LMP 06/12/2018 (Exact Date)   Breastfeeding? No   BMI 27 62 kg/m²     Assessment     24 y o  [id number] here for contraceptive counseling regarding the nexplanon  Plan    Contraceptive options were reviewed, including hormonal methods, both combination (pill, patch, vaginal ring) and progesterone-only (pill, Depo Provera and Nexplanon), intrauterine devices (Mirena, Codie and Paraguard), barrier methods (condoms, diaphragm) and male/female sterilization  The mechanisms, risks, benefits and side effects of all methods were discussed  All questions have been answered to her satisfaction  Patient would like to be restarted on the nexplanon   Mechanism of action, risks, and benefits were discussed  Discussed the bleeding profile, that 1/3 of patients with become amenorrheic, 1/3 of patients will have irregular bleeding, and 1/3 of patients will have regular bleeding  As she became amenorrheic with previous nexplanon, it is likely that she will have the same bleeding profile  Discussed risks of nexplanon, including bleeding, pain, implant migration, failure, intrauterine or ectopic pregnancy  Encouraged patient to remain abstinent until nexplanon insertion and then use backup method for the first week while on nexplanon  UPT negative today  Will repeat UPT prior to insertion

## 2018-08-13 ENCOUNTER — TELEPHONE (OUTPATIENT)
Dept: PEDIATRICS CLINIC | Facility: CLINIC | Age: 28
End: 2018-08-13

## 2018-08-13 NOTE — TELEPHONE ENCOUNTER
A v/m mail was placed asking Lamar to contact this nurse at her convenience                ----- Message from Andreia Gaitan sent at 8/13/2018 10:49 AM EDT -----  Regarding: pt call  Kalen Palm routed conversation to  [facility] 1 hour ago (9:42 AM)    Osmel Kc [phone]  Kalen Palm 1 hour ago (9:40 AM)    Kalen Palm 1 hour ago (9:40 AM)          [address]  ORDERS, SCRIPTS? ?       Documentation

## 2018-10-09 ENCOUNTER — OFFICE VISIT (OUTPATIENT)
Dept: FAMILY MEDICINE CLINIC | Facility: CLINIC | Age: 28
End: 2018-10-09
Payer: COMMERCIAL

## 2018-10-09 VITALS
SYSTOLIC BLOOD PRESSURE: 118 MMHG | BODY MASS INDEX: 26.77 KG/M2 | DIASTOLIC BLOOD PRESSURE: 80 MMHG | RESPIRATION RATE: 16 BRPM | OXYGEN SATURATION: 98 % | HEART RATE: 81 BPM | TEMPERATURE: 97 F | WEIGHT: 144 LBS

## 2018-10-09 DIAGNOSIS — R30.0 DYSURIA: ICD-10-CM

## 2018-10-09 DIAGNOSIS — H10.9 BACTERIAL CONJUNCTIVITIS OF RIGHT EYE: Primary | ICD-10-CM

## 2018-10-09 DIAGNOSIS — N76.0 ACUTE VAGINITIS: ICD-10-CM

## 2018-10-09 LAB
BILIRUB UR QL STRIP: NEGATIVE
CLARITY UR: NORMAL
COLOR UR: NORMAL
GLUCOSE UR STRIP-MCNC: NEGATIVE MG/DL
HGB UR QL STRIP.AUTO: NEGATIVE
KETONES UR STRIP-MCNC: NEGATIVE MG/DL
LEUKOCYTE ESTERASE UR QL STRIP: NEGATIVE
NITRITE UR QL STRIP: NEGATIVE
PH UR STRIP.AUTO: 6 [PH] (ref 4.5–8)
PROT UR STRIP-MCNC: NEGATIVE MG/DL
SL AMB  POCT GLUCOSE, UA: NORMAL
SL AMB LEUKOCYTE ESTERASE,UA: NEGATIVE
SL AMB POCT BILIRUBIN,UA: NEGATIVE
SL AMB POCT BLOOD,UA: NEGATIVE
SL AMB POCT CLARITY,UA: NORMAL
SL AMB POCT COLOR,UA: YELLOW
SL AMB POCT KETONES,UA: NEGATIVE
SL AMB POCT NITRITE,UA: NEGATIVE
SL AMB POCT PH,UA: 5
SL AMB POCT SPECIFIC GRAVITY,UA: 1.02
SL AMB POCT URINE PROTEIN: NORMAL
SL AMB POCT UROBILINOGEN: NORMAL
SP GR UR STRIP.AUTO: 1.02 (ref 1–1.03)
UROBILINOGEN UR QL STRIP.AUTO: 0.2 E.U./DL

## 2018-10-09 PROCEDURE — 99213 OFFICE O/P EST LOW 20 MIN: CPT | Performed by: PHYSICIAN ASSISTANT

## 2018-10-09 PROCEDURE — 81002 URINALYSIS NONAUTO W/O SCOPE: CPT | Performed by: PHYSICIAN ASSISTANT

## 2018-10-09 PROCEDURE — 81003 URINALYSIS AUTO W/O SCOPE: CPT | Performed by: PHYSICIAN ASSISTANT

## 2018-10-09 RX ORDER — SULFAMETHOXAZOLE AND TRIMETHOPRIM 800; 160 MG/1; MG/1
1 TABLET ORAL EVERY 12 HOURS SCHEDULED
Qty: 14 TABLET | Refills: 0 | Status: SHIPPED | OUTPATIENT
Start: 2018-10-09 | End: 2018-10-09 | Stop reason: CLARIF

## 2018-10-09 RX ORDER — SULFACETAMIDE SODIUM 100 MG/ML
2 SOLUTION/ DROPS OPHTHALMIC 4 TIMES DAILY
Qty: 15 ML | Refills: 0 | Status: SHIPPED | OUTPATIENT
Start: 2018-10-09 | End: 2019-07-08

## 2018-10-09 RX ORDER — METRONIDAZOLE 500 MG/1
500 TABLET ORAL EVERY 12 HOURS SCHEDULED
Qty: 14 TABLET | Refills: 0 | Status: SHIPPED | OUTPATIENT
Start: 2018-10-09 | End: 2018-10-16

## 2018-10-09 RX ORDER — METRONIDAZOLE 500 MG/1
TABLET ORAL
COMMUNITY
Start: 2018-05-18 | End: 2018-10-09 | Stop reason: SDUPTHER

## 2018-10-09 NOTE — ASSESSMENT & PLAN NOTE
- will prescribe bleph 10 eyedrops for use 4x per day for 7 days; use in both eyes to prevent future infection of the left eye  - handwashing is very important, especially with children   - apply warm or cool compress on the eye for 15 minutes 2-3 times per day; whichever feels more soothing

## 2018-10-09 NOTE — ASSESSMENT & PLAN NOTE
- will treat prophylactically with metronidzole 500 mg BID x 7 days for vaginitis since patient has had this in the past with the same symptoms

## 2018-10-29 ENCOUNTER — PROCEDURE VISIT (OUTPATIENT)
Dept: OBGYN CLINIC | Facility: HOSPITAL | Age: 28
End: 2018-10-29
Payer: COMMERCIAL

## 2018-10-29 VITALS
HEART RATE: 73 BPM | HEIGHT: 60 IN | BODY MASS INDEX: 28.07 KG/M2 | SYSTOLIC BLOOD PRESSURE: 113 MMHG | DIASTOLIC BLOOD PRESSURE: 69 MMHG | WEIGHT: 143 LBS

## 2018-10-29 DIAGNOSIS — Z30.017 NEXPLANON INSERTION: Primary | ICD-10-CM

## 2018-10-29 PROBLEM — K64.9 HEMORRHOIDS: Status: ACTIVE | Noted: 2018-01-15

## 2018-10-29 LAB — SL AMB POCT URINE HCG: NORMAL

## 2018-10-29 PROCEDURE — 81025 URINE PREGNANCY TEST: CPT | Performed by: OBSTETRICS & GYNECOLOGY

## 2018-10-29 PROCEDURE — 11981 INSERTION DRUG DLVR IMPLANT: CPT | Performed by: OBSTETRICS & GYNECOLOGY

## 2018-10-29 NOTE — PROGRESS NOTES
ASSESMENT & PLAN:           1  Encounter for Nexplanon insertion   - See procedure note below   - Pt tolerated the procedure well without any complications  2  Return to care as needed    SUBJECTIVE:             Patient is a 34 y o  at [id number] who presents for Nexplanon insertion  She previously had a Nexplanon inserted last year which lasted for 4 months as she had it removed so she could get pregnant with her partner at the time  Since then, she and that partner have  so she would like to resume birth control as she has 3 children (who are present with her in the exam room today)  She tolerated the Nexplanon well in the past     Review of Systems   Review of Systems   Constitutional: Negative for fatigue and fever  Respiratory: Negative for cough, shortness of breath and wheezing  Gastrointestinal: Negative for diarrhea, nausea and vomiting  Genitourinary: Negative for flank pain, genital sores, hematuria, vaginal bleeding and vaginal discharge  Musculoskeletal: Negative for back pain  OB Hx:  Obstetric History       T2      L3     SAB0   TAB0   Ectopic0   Multiple0   Live Births3       # Outcome Date GA Lbr Michele/2nd Weight Sex Delivery Anes PTL Lv   3 Term 17 38w0d    CS-Unspec   ROBERTO   2 Term 12/11/15 38w0d    CS-Unspec   ROBERTO   1  12 32w0d    CS-Unspec   ROBERTO        Medical Hx  History reviewed  No pertinent past medical history    Surgical Hx  Past Surgical History:   Procedure Laterality Date     SECTION  2017     Family Hx  Family History   Problem Relation Age of Onset    No Known Problems Mother     No Known Problems Father     No Known Problems Sister     No Known Problems Brother     Diabetes Maternal Grandmother     No Known Problems Paternal Grandmother     No Known Problems Sister     No Known Problems Brother     No Known Problems Brother     No Known Problems Brother      Social Hx  Social History     Social History    Marital status: Single     Spouse name: N/A    Number of children: N/A    Years of education: N/A     Occupational History    Not on file  Social History Main Topics    Smoking status: Current Every Day Smoker     Types: Cigarettes    Smokeless tobacco: Current User      Comment: smokes 4 cigarettes/day, says she wants to quit but is refusing counseling    Alcohol use No    Drug use: No    Sexual activity: Yes     Partners: Male     Birth control/ protection: Condom Male      Comment: 3 days ago     Other Topics Concern    Not on file     Social History Narrative    No narrative on file     Allergies  Allergies   Allergen Reactions    Meperidine Rash     Meds    Current Outpatient Prescriptions:     sulfacetamide (BLEPH-10) 10 % ophthalmic solution, Administer 2 drops to both eyes 4 (four) times a day (Patient not taking: Reported on 10/29/2018 ), Disp: 15 mL, Rfl: 0    Current Facility-Administered Medications:     etonogestrel (NEXPLANON) subdermal implant 68 mg, 68 mg, Subdermal, Once, [employer], DO    OBJECTIVE:               Vitals  Vitals:    10/29/18 1621   BP: 113/69   Pulse: 73         Physical Exam   Constitutional: She appears well-developed and well-nourished  HENT:   Head: Normocephalic and atraumatic  Nose: Nose normal    Eyes: Pupils are equal, round, and reactive to light  EOM are normal    Neck: Normal range of motion  Pulmonary/Chest: Effort normal  No respiratory distress  Abdominal: Soft  Bowel sounds are normal  There is no guarding  Musculoskeletal:   Prior nexplanon incision scar on L ventral aspect of upper arm 8cm from medial epicondyle   Vitals reviewed      Remove and insert drug implant  Date/Time: 10/29/2018 6:03 PM  Performed by: Magi Saini  Authorized by: Magi Saini     Consent:     Consent obtained:  Written and verbal    Consent given by:  Patient    Procedural risks discussed:  Bleeding, damage to other organs, failure rate, infection, repeat procedure, possible loss of function, possible conversion to open surgery and possible continued pain    Patient questions answered: yes      Patient agrees, verbalizes understanding, and wants to proceed: yes      Educational handouts given: no      Instructions and paperwork completed: no    Universal Protocol:     Patient states understanding of procedure being performed: yes      Relevant documents present and verified: yes    Indication:     Indication: Insertion of non-biodegradable drug delivery implant    Pre-procedure:     Pre-procedure timeout performed: yes      Prepped with: alcohol 70% and povidone-iodine      Local anesthetic:  Lidocaine 1%  Procedure:     Procedure:   Insertion    Small stab incision was made in arm: yes      Left/right:  Left    Preloaded Implanon trocar was placed subdermally: yes      Visualization of implant was obtained: yes      Implanon was inserted and trocar removed: yes      Visualization of notch in stilette and palpitation of device: yes      Palpitation confirms placement by provider and patient: yes      Site was closed with steri-strips and pressure bandage applied: yes          Clarisse Yap, DO  PGY-2 OB/GYN   10/29/2018 5:58 PM

## 2019-01-02 ENCOUNTER — HOSPITAL ENCOUNTER (EMERGENCY)
Facility: HOSPITAL | Age: 29
Discharge: HOME | End: 2019-01-02
Attending: EMERGENCY MEDICINE | Admitting: EMERGENCY MEDICINE
Payer: COMMERCIAL

## 2019-01-02 VITALS
OXYGEN SATURATION: 100 % | SYSTOLIC BLOOD PRESSURE: 131 MMHG | TEMPERATURE: 96.8 F | HEIGHT: 61 IN | RESPIRATION RATE: 16 BRPM | HEART RATE: 70 BPM | BODY MASS INDEX: 28.13 KG/M2 | WEIGHT: 149 LBS | DIASTOLIC BLOOD PRESSURE: 71 MMHG

## 2019-01-02 DIAGNOSIS — M79.645 FINGER PAIN, LEFT: Primary | ICD-10-CM

## 2019-01-02 PROCEDURE — 99283 EMERGENCY DEPT VISIT LOW MDM: CPT

## 2019-01-02 RX ORDER — MAGNESIUM HYDROXIDE/ALUMINUM HYDROXICE/SIMETHICONE 120; 1200; 1200 MG/30ML; MG/30ML; MG/30ML
30 SUSPENSION ORAL ONCE
Status: COMPLETED | OUTPATIENT
Start: 2019-01-02 | End: 2019-01-02

## 2019-01-02 RX ORDER — IBUPROFEN 400 MG/1
800 TABLET ORAL ONCE
Status: COMPLETED | OUTPATIENT
Start: 2019-01-02 | End: 2019-01-02

## 2019-01-02 RX ADMIN — ALUMINUM HYDROXIDE, MAGNESIUM HYDROXIDE, AND SIMETHICONE 30 ML: 200; 200; 20 SUSPENSION ORAL at 09:54

## 2019-01-02 RX ADMIN — IBUPROFEN 800 MG: 400 TABLET ORAL at 09:55

## 2019-01-02 NOTE — ED PROVIDER NOTES
History  Chief Complaint   Patient presents with    Finger Pain     I have pain to my left third finger  I had no  injury, but I do chew my nails  HPI    Patient is a 34year old female with no significant past medical history who presents today with a chief complaint of pain in the left middle finger which began approximately 2 days ago  Patient denies any recent trauma to the area as well as fever, chills, erythema or swelling  She does however report that she bites her nails frequently  Patient also reports recent URI symptoms  Prior to Admission Medications   Prescriptions Last Dose Informant Patient Reported? Taking?   sulfacetamide (BLEPH-10) 10 % ophthalmic solution   No No   Sig: Administer 2 drops to both eyes 4 (four) times a day   Patient not taking: Reported on 10/29/2018       Facility-Administered Medications: None       History reviewed  No pertinent past medical history  Past Surgical History:   Procedure Laterality Date     SECTION  2017       Family History   Problem Relation Age of Onset    No Known Problems Mother     No Known Problems Father     No Known Problems Sister     No Known Problems Brother     Diabetes Maternal Grandmother     No Known Problems Paternal Grandmother     No Known Problems Sister     No Known Problems Brother     No Known Problems Brother     No Known Problems Brother      I have reviewed and agree with the history as documented  Social History   Substance Use Topics    Smoking status: Current Every Day Smoker     Types: Cigarettes    Smokeless tobacco: Current User      Comment: smokes 4 cigarettes/day, says she wants to quit but is refusing counseling    Alcohol use No        Review of Systems   Constitutional: Negative for activity change, appetite change, chills and fever  HENT: Negative for congestion, ear pain, rhinorrhea, sinus pain and sore throat  Eyes: Negative for pain and visual disturbance     Respiratory: Negative for cough, chest tightness, shortness of breath and wheezing  Cardiovascular: Negative for chest pain, palpitations and leg swelling  Gastrointestinal: Negative for abdominal pain, constipation, diarrhea, nausea and vomiting  Endocrine: Negative for cold intolerance, heat intolerance and polyuria  Genitourinary: Negative for difficulty urinating, dysuria and pelvic pain  Musculoskeletal: Positive for arthralgias  Negative for back pain, gait problem and myalgias  Skin: Negative for color change, pallor, rash and wound  Neurological: Negative for dizziness, weakness, light-headedness, numbness and headaches  Hematological: Negative for adenopathy  Does not bruise/bleed easily  Psychiatric/Behavioral: Negative for agitation, behavioral problems, confusion, decreased concentration and dysphoric mood  The patient is not nervous/anxious  Physical Exam  ED Triage Vitals [01/02/19 0907]   Temperature Pulse Respirations Blood Pressure SpO2   (!) 96 8 °F (36 °C) 70 16 131/71 100 %      Temp Source Heart Rate Source Patient Position - Orthostatic VS BP Location FiO2 (%)   Tympanic Monitor Sitting Left arm --      Pain Score       5           Orthostatic Vital Signs  Vitals:    01/02/19 0907   BP: 131/71   Pulse: 70   Patient Position - Orthostatic VS: Sitting       Physical Exam   Constitutional: She is oriented to person, place, and time  She appears well-developed and well-nourished  No distress  HENT:   Head: Normocephalic and atraumatic  Eyes: Pupils are equal, round, and reactive to light  EOM are normal    Neck: Normal range of motion  Neck supple  Cardiovascular: Normal rate, regular rhythm and normal heart sounds  Pulmonary/Chest: Effort normal and breath sounds normal  No respiratory distress  She has no wheezes  Abdominal: Soft  Bowel sounds are normal  She exhibits no distension  There is no tenderness  Musculoskeletal: Normal range of motion  She exhibits tenderness  She exhibits no edema or deformity  Tenderness to palpation of the left middle finger of the proximal phalanx and knuckle   Neurological: She is alert and oriented to person, place, and time  Skin: Skin is warm  No rash noted  No erythema  Psychiatric: She has a normal mood and affect  Her behavior is normal  Judgment and thought content normal        ED Medications  Medications   ibuprofen (MOTRIN) tablet 800 mg (800 mg Oral Given 1/2/19 3714)   aluminum-magnesium hydroxide-simethicone (MYLANTA) 200-200-20 mg/5 mL oral suspension 30 mL (30 mL Oral Given 1/2/19 2304)       Diagnostic Studies  Results Reviewed     None                 No orders to display         Procedures  Procedures      Phone Consults  ED Phone Contact    ED Course         MDM  Number of Diagnoses or Management Options  Finger pain, left:   Diagnosis management comments: 34year old female presenting to the ED with a chief complaint of pain in the left middle finger which began approximately 2 days ago  Patient denies any recent trauma to the area but states that she does bite her fingernails  She denies any fever or chills and no erythema or swelling were noted on physical exam  Patient was given ibuprofen in the ED for the pain and instructed to take pain medication as needed  Patient encouraged to follow up with her PCP should symptoms worsen or fail to improve  CritCare Time    Disposition  Final diagnoses:   Finger pain, left     Time reflects when diagnosis was documented in both MDM as applicable and the Disposition within this note     Time User Action Codes Description Comment    1/2/2019  9:56 AM Jamey Bergeron Add [T85 804] Finger pain, left       ED Disposition     ED Disposition Condition Comment    Discharge  [address] discharge to home/self care      Condition at discharge: Good        Follow-up Information     Follow up With Specialties Details Why JONO Tang Psychiatry, Physician Assistant As needed [address]  [phone]            Patient's Medications   Discharge Prescriptions    No medications on file     No discharge procedures on file  ED Provider  Attending physically available and evaluated Krista La I managed the patient along with the ED Attending      Electronically Signed by  MD Mark Payan MD  01/02/19 6049

## 2019-01-02 NOTE — DISCHARGE INSTRUCTIONS
Artralgia   LO QUE NECESITA SABER:   La artralgia es dolor en mateo o más articulaciones, imer sin inflamación  El dolor podría ser de corta duración y mejorar dentro de 6 a 8 semanas  La artralgia puede ser mateo señal temprana de artritis  La artralgia puede ser causada por mateo condición médica erika un trastorno hormonal o un tumor  Además podría ser la consecuencia de mateo infección o Anna Duque  INSTRUCCIONES SOBRE EL TILA HOSPITALARIA:   Medicamentos:  A usted le pueden  prescribir los siguientes medicamentos:  · El acetaminofén  kashif el dolor  Pregunte la cantidad y la frecuencia con que debe tomarlos  [address]  El acetaminofén puede causar daño en el hígado cuando no se gerardo de forma correcta  · AINEs (Analgésicos antiinflamatorios no esteroides)  disminuyen el dolor y previenen la inflamación  Consulte con barnes médico cuál medicamento es el adecuado para usted  Pregunte cuánto coleman y cuándo  Tómelos erika se le indique  Cuando no se gil de la [employer], los medicamentos antiinflamatorios no esteroides pueden causar sangrado estomacal y problemas renales  · La crema para el alivio del dolor  kashif el dolor  [address]  · [place] thelma medicamentos erika se le haya indicado  Consulte con barnes médico si usted ruth que barnes medicamento no le está ayudando o si presenta efectos secundarios  Infórmele si es alérgico a algún medicamento  Mantenga mateo lista actualizada de los [place], las vitaminas y los productos herbales que gerardo  Incluya los siguientes datos de los medicamentos: cantidad, frecuencia y motivo de administración  Traiga con usted la lista o los envases de la píldoras a thelma citas de seguimiento  Lleve la lista de los medicamentos con usted en roya de mateo emergencia  Marcos mateo sagrario de control con barnes médico o especialista erika se lo indicaron:  Anote thelma preguntas para que se acuerde de hacerlas kalyan thelma visitas     Cuidados personales:   · La aplicación de calor  para ayudar a reducir el dolor  Use mateo compresa o envoltura caliente  Aplíquese calor de 20 a 30 minutos cada 2 horas kalyan los días que le indiquen  · Descanse  lo más posible  Evite actividades que causan [employer] articulaciones  · Aplique hielo  para ayudar a bajar la inflamación y el dolor  El hielo también puede contribuir a evitar el daño de los tejidos  Use un paquete de hielo o ponga hielo molido dentro de [employer]  Cúbrala con mateo toalla y póngasela en la articulación adolorida cada hora kalyan 15 o 20 minutos o según se le haya indicado  · [place]  la articulación con mateo férula o envoltura elástica según indicaciones  · Eleve  la articulación de [employer] que quede por encima del nivel de barnes corazón tan seguido erika pueda para ayudar a reducir la inflamación y el dolor  Use almohadas o cobijas dobladas para elevar la articulación de forma cómoda  · Pierda peso  si tiene sobrepeso  El peso extra puede ejercer presión sobre thelma articulaciones y causarle más dolor  Consulte con barnes médico cuánto debería pesar  También pídale que le ayude a diseñar un buen plan de pérdida de peso  · El ejercicio  con regularidad para ayudar a mejorar el movimiento de las articulaciones y disminuir el dolor  Pida más información acerca de un plan de ejercicio adecuado para usted  Los ejercicios de bajo impacto pueden ayudar a quitar algo de la presión en las articulaciones  Jovi Kelp de ejercicios de bajo impacto son caminar, nadar y practicar aeróbicos acuáticos  Fisioterapia:  Un fisioterapeuta le puede enseñar ejercicios para ayudarle a mejorar el movimiento y la fuerza, y para disminuir el dolor  Pregúntele a barnes médico si la fisioterapia es apropiada para usted  Comuníquese con barnes especialista o médico sí:   · Usted tiene fiebre  · Usted continúa sintiendo dolor en las articulaciones y el dolor no se le kashif con calor, hielo o medicamento      · Usted tiene dolor e inflamación alrededor de la articulación  · Usted tiene preguntas o inquietudes acerca de ramon condición o cuidado  Regrese a la nasreen de emergencias si:   · Usted de repente siente un dolor severo cuando mueve la articulación  · Usted tiene fiebre y escalofríos leslie  · Usted no puede  la articulación  · Usted pierde sensibilidad en el lado del cuerpo donde está la articulación adolorida  © 2017 [address] Information is for End User's use only and may not be sold, redistributed or otherwise used for commercial purposes  All illustrations and images included in CareNotes® are the copyrighted property of A D A M , Inc  or Patricio Reynolds  Esta información es sólo para uso en educación  Ramon intención no es darle un consejo médico sobre enfermedades o tratamientos  Colsulte con ramon Paulina Points farmacéutico antes de seguir cualquier régimen médico para saber si es seguro y efectivo para usted

## 2019-04-02 ENCOUNTER — OFFICE VISIT (OUTPATIENT)
Dept: OBGYN CLINIC | Facility: CLINIC | Age: 29
End: 2019-04-02

## 2019-04-02 VITALS
WEIGHT: 152 LBS | BODY MASS INDEX: 28.7 KG/M2 | HEIGHT: 61 IN | SYSTOLIC BLOOD PRESSURE: 120 MMHG | DIASTOLIC BLOOD PRESSURE: 60 MMHG

## 2019-04-02 DIAGNOSIS — B96.89 BV (BACTERIAL VAGINOSIS): ICD-10-CM

## 2019-04-02 DIAGNOSIS — N76.0 BV (BACTERIAL VAGINOSIS): ICD-10-CM

## 2019-04-02 DIAGNOSIS — N92.6 MISSED PERIOD: Primary | ICD-10-CM

## 2019-04-02 LAB — SL AMB POCT URINE HCG: NEGATIVE

## 2019-04-02 PROCEDURE — 81025 URINE PREGNANCY TEST: CPT | Performed by: OBSTETRICS & GYNECOLOGY

## 2019-04-02 PROCEDURE — 99213 OFFICE O/P EST LOW 20 MIN: CPT | Performed by: OBSTETRICS & GYNECOLOGY

## 2019-04-02 RX ORDER — METRONIDAZOLE 500 MG/1
500 TABLET ORAL EVERY 12 HOURS SCHEDULED
Qty: 14 TABLET | Refills: 0 | Status: SHIPPED | OUTPATIENT
Start: 2019-04-02 | End: 2019-04-09

## 2019-04-07 ENCOUNTER — HOSPITAL ENCOUNTER (EMERGENCY)
Facility: HOSPITAL | Age: 29
Discharge: HOME | End: 2019-04-07
Attending: EMERGENCY MEDICINE
Payer: COMMERCIAL

## 2019-04-07 DIAGNOSIS — B37.3 VAGINAL CANDIDIASIS: Primary | ICD-10-CM

## 2019-04-07 DIAGNOSIS — N39.0 UTI (URINARY TRACT INFECTION): ICD-10-CM

## 2019-04-07 LAB
BACTERIA UR QL AUTO: ABNORMAL /HPF
BILIRUB UR QL STRIP: NEGATIVE
CLARITY UR: CLEAR
COLOR UR: ABNORMAL
EXT PREG TEST URINE: NEGATIVE
GLUCOSE UR STRIP-MCNC: NEGATIVE MG/DL
HGB UR QL STRIP.AUTO: 25
KETONES UR STRIP-MCNC: ABNORMAL MG/DL
LEUKOCYTE ESTERASE UR QL STRIP: 500
MUCOUS THREADS UR QL AUTO: ABNORMAL
NITRITE UR QL STRIP: NEGATIVE
NON-SQ EPI CELLS URNS QL MICRO: ABNORMAL /HPF
PH UR STRIP.AUTO: 6 [PH]
PROT UR STRIP-MCNC: ABNORMAL MG/DL
RBC #/AREA URNS AUTO: ABNORMAL /HPF
SP GR UR STRIP.AUTO: 1.02 (ref 1–1.04)
UROBILINOGEN UA: NEGATIVE MG/DL
WBC #/AREA URNS AUTO: ABNORMAL /HPF

## 2019-04-07 PROCEDURE — 81001 URINALYSIS AUTO W/SCOPE: CPT | Performed by: PHYSICIAN ASSISTANT

## 2019-04-07 PROCEDURE — 87510 GARDNER VAG DNA DIR PROBE: CPT | Performed by: PHYSICIAN ASSISTANT

## 2019-04-07 PROCEDURE — 87660 TRICHOMONAS VAGIN DIR PROBE: CPT | Performed by: PHYSICIAN ASSISTANT

## 2019-04-07 PROCEDURE — 96372 THER/PROPH/DIAG INJ SC/IM: CPT

## 2019-04-07 PROCEDURE — 81025 URINE PREGNANCY TEST: CPT | Performed by: PHYSICIAN ASSISTANT

## 2019-04-07 PROCEDURE — 87591 N.GONORRHOEAE DNA AMP PROB: CPT | Performed by: PHYSICIAN ASSISTANT

## 2019-04-07 PROCEDURE — 99283 EMERGENCY DEPT VISIT LOW MDM: CPT

## 2019-04-07 PROCEDURE — 87086 URINE CULTURE/COLONY COUNT: CPT | Performed by: PHYSICIAN ASSISTANT

## 2019-04-07 PROCEDURE — 87491 CHLMYD TRACH DNA AMP PROBE: CPT | Performed by: PHYSICIAN ASSISTANT

## 2019-04-07 PROCEDURE — 99284 EMERGENCY DEPT VISIT MOD MDM: CPT | Performed by: PHYSICIAN ASSISTANT

## 2019-04-07 PROCEDURE — 81003 URINALYSIS AUTO W/O SCOPE: CPT | Performed by: PHYSICIAN ASSISTANT

## 2019-04-07 PROCEDURE — 87480 CANDIDA DNA DIR PROBE: CPT | Performed by: PHYSICIAN ASSISTANT

## 2019-04-07 RX ORDER — CEPHALEXIN 500 MG/1
500 CAPSULE ORAL EVERY 8 HOURS SCHEDULED
Qty: 30 CAPSULE | Refills: 0 | Status: SHIPPED | OUTPATIENT
Start: 2019-04-07 | End: 2019-04-17

## 2019-04-07 RX ORDER — AZITHROMYCIN 250 MG/1
1000 TABLET, FILM COATED ORAL ONCE
Status: COMPLETED | OUTPATIENT
Start: 2019-04-07 | End: 2019-04-07

## 2019-04-07 RX ORDER — FLUCONAZOLE 100 MG/1
200 TABLET ORAL ONCE
Status: COMPLETED | OUTPATIENT
Start: 2019-04-07 | End: 2019-04-07

## 2019-04-07 RX ADMIN — FLUCONAZOLE 200 MG: 100 TABLET ORAL at 21:20

## 2019-04-07 RX ADMIN — LIDOCAINE HYDROCHLORIDE 250 MG: 10 INJECTION, SOLUTION EPIDURAL; INFILTRATION; INTRACAUDAL; PERINEURAL at 21:21

## 2019-04-07 RX ADMIN — AZITHROMYCIN 1000 MG: 250 TABLET, FILM COATED ORAL at 21:20

## 2019-04-08 ENCOUNTER — OFFICE VISIT (OUTPATIENT)
Dept: OBGYN CLINIC | Facility: CLINIC | Age: 29
End: 2019-04-08

## 2019-04-08 VITALS
SYSTOLIC BLOOD PRESSURE: 120 MMHG | WEIGHT: 152 LBS | DIASTOLIC BLOOD PRESSURE: 60 MMHG | BODY MASS INDEX: 28.7 KG/M2 | HEIGHT: 61 IN

## 2019-04-08 DIAGNOSIS — N76.0 ACUTE VAGINITIS: Primary | ICD-10-CM

## 2019-04-08 PROCEDURE — 99213 OFFICE O/P EST LOW 20 MIN: CPT | Performed by: FAMILY MEDICINE

## 2019-04-08 RX ORDER — NYSTATIN AND TRIAMCINOLONE ACETONIDE 100000; 1 [USP'U]/G; MG/G
OINTMENT TOPICAL 2 TIMES DAILY
Qty: 30 G | Refills: 0 | Status: SHIPPED | OUTPATIENT
Start: 2019-04-08 | End: 2019-07-08

## 2019-04-09 LAB
BACTERIA UR CULT: NORMAL
C TRACH DNA SPEC QL NAA+PROBE: POSITIVE
CANDIDA RRNA VAG QL PROBE: POSITIVE
G VAGINALIS RRNA GENITAL QL PROBE: NEGATIVE
N GONORRHOEA DNA SPEC QL NAA+PROBE: NEGATIVE
T VAGINALIS RRNA GENITAL QL PROBE: NEGATIVE

## 2019-07-07 ENCOUNTER — HOSPITAL ENCOUNTER (EMERGENCY)
Facility: HOSPITAL | Age: 29
Discharge: HOME | End: 2019-07-07
Attending: EMERGENCY MEDICINE | Admitting: EMERGENCY MEDICINE
Payer: COMMERCIAL

## 2019-07-07 VITALS
BODY MASS INDEX: 30.55 KG/M2 | TEMPERATURE: 97.5 F | OXYGEN SATURATION: 98 % | WEIGHT: 161.7 LBS | HEART RATE: 85 BPM | RESPIRATION RATE: 16 BRPM | SYSTOLIC BLOOD PRESSURE: 158 MMHG | DIASTOLIC BLOOD PRESSURE: 92 MMHG

## 2019-07-07 DIAGNOSIS — S61.419A HAND LACERATION: Primary | ICD-10-CM

## 2019-07-07 PROCEDURE — 99283 EMERGENCY DEPT VISIT LOW MDM: CPT | Performed by: PHYSICIAN ASSISTANT

## 2019-07-07 PROCEDURE — 90715 TDAP VACCINE 7 YRS/> IM: CPT | Performed by: PHYSICIAN ASSISTANT

## 2019-07-07 PROCEDURE — 99282 EMERGENCY DEPT VISIT SF MDM: CPT

## 2019-07-07 PROCEDURE — 90471 IMMUNIZATION ADMIN: CPT

## 2019-07-07 RX ORDER — CEPHALEXIN 500 MG/1
500 CAPSULE ORAL EVERY 8 HOURS SCHEDULED
Qty: 30 CAPSULE | Refills: 0 | Status: SHIPPED | OUTPATIENT
Start: 2019-07-07 | End: 2019-07-17

## 2019-07-07 RX ORDER — ACETAMINOPHEN 325 MG/1
650 TABLET ORAL EVERY 6 HOURS PRN
Qty: 30 TABLET | Refills: 0 | Status: SHIPPED | OUTPATIENT
Start: 2019-07-07 | End: 2019-08-24

## 2019-07-07 RX ORDER — LIDOCAINE HYDROCHLORIDE 10 MG/ML
5 INJECTION, SOLUTION EPIDURAL; INFILTRATION; INTRACAUDAL; PERINEURAL ONCE
Status: DISCONTINUED | OUTPATIENT
Start: 2019-07-07 | End: 2019-07-07

## 2019-07-07 RX ORDER — CEPHALEXIN 500 MG/1
500 CAPSULE ORAL ONCE
Status: COMPLETED | OUTPATIENT
Start: 2019-07-07 | End: 2019-07-07

## 2019-07-07 RX ORDER — ACETAMINOPHEN 325 MG/1
650 TABLET ORAL ONCE
Status: COMPLETED | OUTPATIENT
Start: 2019-07-07 | End: 2019-07-07

## 2019-07-07 RX ADMIN — CEPHALEXIN 500 MG: 500 CAPSULE ORAL at 17:25

## 2019-07-07 RX ADMIN — TETANUS TOXOID, REDUCED DIPHTHERIA TOXOID AND ACELLULAR PERTUSSIS VACCINE, ADSORBED 0.5 ML: 5; 2.5; 8; 8; 2.5 SUSPENSION INTRAMUSCULAR at 17:26

## 2019-07-07 RX ADMIN — ACETAMINOPHEN 650 MG: 325 TABLET ORAL at 17:24

## 2019-07-07 NOTE — ED PROVIDER NOTES
History  Chief Complaint   Patient presents with    Hand Laceration     that she was washing dishes and cut right hand in 2 places when glass broke       History provided by:  Patient   used: No    Medical Problem   Location:  Pt with cut to right hand from broken glass in sink  Severity:  Mild  Onset quality:  Sudden  Duration:  1 hour  Timing:  Constant  Chronicity:  New  Associated symptoms: no abdominal pain, no chest pain, no congestion, no cough, no diarrhea, no ear pain, no fatigue, no fever, no headaches, no loss of consciousness, no myalgias, no nausea, no rash, no rhinorrhea, no shortness of breath, no sore throat, no vomiting and no wheezing        Prior to Admission Medications   Prescriptions Last Dose Informant Patient Reported? Taking?   nystatin-triamcinolone (MYCOLOG-II) ointment   No No   Sig: Apply topically 2 (two) times a day   sulfacetamide (BLEPH-10) 10 % ophthalmic solution   No No   Sig: Administer 2 drops to both eyes 4 (four) times a day   Patient not taking: Reported on 10/29/2018       Facility-Administered Medications: None       History reviewed  No pertinent past medical history  Past Surgical History:   Procedure Laterality Date     SECTION  2017       Family History   Problem Relation Age of Onset    No Known Problems Mother     No Known Problems Father     No Known Problems Sister     No Known Problems Brother     Diabetes Maternal Grandmother     No Known Problems Paternal Grandmother     No Known Problems Sister     No Known Problems Brother     No Known Problems Brother     No Known Problems Brother      I have reviewed and agree with the history as documented      Social History     Tobacco Use    Smoking status: Current Every Day Smoker     Packs/day: 0 20     Types: Cigarettes    Smokeless tobacco: Current User    Tobacco comment: smokes 4 cigarettes/day, says she wants to quit but is refusing counseling   Substance Use Topics    Alcohol use: No    Drug use: No        Review of Systems   Constitutional: Negative  Negative for fatigue and fever  HENT: Negative  Negative for congestion, ear pain, rhinorrhea and sore throat  Eyes: Negative  Respiratory: Negative  Negative for cough, shortness of breath and wheezing  Cardiovascular: Negative  Negative for chest pain  Gastrointestinal: Negative  Negative for abdominal pain, diarrhea, nausea and vomiting  Endocrine: Negative  Genitourinary: Negative  Musculoskeletal: Negative  Negative for myalgias  Skin: Negative for rash  Allergic/Immunologic: Negative  Neurological: Negative  Negative for loss of consciousness and headaches  Hematological: Negative  Psychiatric/Behavioral: Negative  All other systems reviewed and are negative  Physical Exam  Physical Exam   Constitutional: She is oriented to person, place, and time  She appears well-developed and well-nourished  HENT:   Head: Normocephalic and atraumatic  Right Ear: External ear normal    Left Ear: External ear normal    Nose: Nose normal    Mouth/Throat: Oropharynx is clear and moist    Eyes: Pupils are equal, round, and reactive to light  Conjunctivae and EOM are normal    Neck: Normal range of motion  Neck supple  Cardiovascular: Normal rate, regular rhythm, normal heart sounds and intact distal pulses  Pulmonary/Chest: Effort normal and breath sounds normal    Abdominal: Bowel sounds are normal    Musculoskeletal: Normal range of motion     Right thumb superficial flap lac  Right 2nd finger prox phalanx superficial flap lac    Discussed with pt about possible flap necrosis  Because of small attachment area  If any complications will go to hand specialist  Pt understands     Also discussed with pt about flaps are too thin to hold  Suture without ripping it  So will do steri strips   Pt declines suture of thumb flap lac   Neurological: She is alert and oriented to person, place, and time  Skin: Skin is warm  Psychiatric: She has a normal mood and affect  Her behavior is normal  Thought content normal    Nursing note and vitals reviewed  Vital Signs  ED Triage Vitals [07/07/19 1653]   Temperature Pulse Respirations Blood Pressure SpO2   97 5 °F (36 4 °C) 85 16 158/92 98 %      Temp Source Heart Rate Source Patient Position - Orthostatic VS BP Location FiO2 (%)   Tympanic Monitor Sitting Right arm --      Pain Score       --           Vitals:    07/07/19 1653   BP: 158/92   Pulse: 85   Patient Position - Orthostatic VS: Sitting         Visual Acuity      ED Medications  Medications   tetanus-diphtheria-acellular pertussis (BOOSTRIX) IM injection 0 5 mL (0 5 mL Intramuscular Given 7/7/19 1726)   acetaminophen (TYLENOL) tablet 650 mg (650 mg Oral Given 7/7/19 1724)   cephalexin (KEFLEX) capsule 500 mg (500 mg Oral Given 7/7/19 1725)       Diagnostic Studies  Results Reviewed     None                 No orders to display              Procedures  Procedures       ED Course                               MDM    Disposition  Final diagnoses:   Hand laceration     Time reflects when diagnosis was documented in both MDM as applicable and the Disposition within this note     Time User Action Codes Description Comment    7/7/2019  5:19 PM Henry Whitaker  Add [E88 129O] Hand laceration       ED Disposition     ED Disposition Condition Date/Time Comment    Discharge Stable Sun Jul 7, 2019  5:19 PM Darryl Koenig discharge to home/self care              Follow-up Information     Follow up With Specialties Details Why [facility] Emergency Department Emergency Medicine In 3 days [place] after 1pm [address] [facility] [address] [address] [phone]          Discharge Medication List as of 7/7/2019  5:21 PM      START taking these medications    Details   acetaminophen (TYLENOL) 325 mg tablet Take 2 tablets (650 mg total) by mouth every 6 (six) hours as needed for mild pain, Starting Sun 7/7/2019, Print      cephalexin (KEFLEX) 500 mg capsule Take 1 capsule (500 mg total) by mouth every 8 (eight) hours for 10 days, Starting Sun 7/7/2019, Until Wed 7/17/2019, Print         CONTINUE these medications which have NOT CHANGED    Details   nystatin-triamcinolone (MYCOLOG-II) ointment Apply topically 2 (two) times a day, Starting Mon 4/8/2019, Normal      sulfacetamide (BLEPH-10) 10 % ophthalmic solution Administer 2 drops to both eyes 4 (four) times a day, Starting Tue 10/9/2018, Normal           No discharge procedures on file      ED Provider  Electronically Signed by           Clement Ureña PA-C  07/07/19 2027

## 2019-07-08 ENCOUNTER — HOSPITAL ENCOUNTER (EMERGENCY)
Facility: HOSPITAL | Age: 29
Discharge: HOME | End: 2019-07-08
Attending: EMERGENCY MEDICINE | Admitting: EMERGENCY MEDICINE
Payer: COMMERCIAL

## 2019-07-08 VITALS
OXYGEN SATURATION: 98 % | DIASTOLIC BLOOD PRESSURE: 56 MMHG | SYSTOLIC BLOOD PRESSURE: 120 MMHG | HEART RATE: 73 BPM | TEMPERATURE: 97.2 F | BODY MASS INDEX: 30.66 KG/M2 | WEIGHT: 162.26 LBS | RESPIRATION RATE: 16 BRPM

## 2019-07-08 DIAGNOSIS — S61.411A LACERATION OF SKIN OF RIGHT HAND, INITIAL ENCOUNTER: Primary | ICD-10-CM

## 2019-07-08 PROCEDURE — 99283 EMERGENCY DEPT VISIT LOW MDM: CPT

## 2019-07-08 PROCEDURE — 99282 EMERGENCY DEPT VISIT SF MDM: CPT | Performed by: PHYSICIAN ASSISTANT

## 2019-07-08 RX ORDER — IBUPROFEN 600 MG/1
600 TABLET ORAL EVERY 6 HOURS PRN
Qty: 20 TABLET | Refills: 0 | Status: SHIPPED | OUTPATIENT
Start: 2019-07-08 | End: 2019-08-24

## 2019-07-08 RX ORDER — BACITRACIN, NEOMYCIN, POLYMYXIN B 400; 3.5; 5 [USP'U]/G; MG/G; [USP'U]/G
1 OINTMENT TOPICAL ONCE
Status: COMPLETED | OUTPATIENT
Start: 2019-07-08 | End: 2019-07-08

## 2019-07-08 RX ADMIN — BACITRACIN, NEOMYCIN, POLYMYXIN B 1 SMALL APPLICATION: 400; 3.5; 5 OINTMENT TOPICAL at 19:34

## 2019-07-08 NOTE — ED PROVIDER NOTES
History  Chief Complaint   Patient presents with    Hand Pain     reports being seen at [facility] yesterday for same hand pain, [facility], here due to pain in right hand  34year old female presents today complaining of right hand pain  Was washing a glass that broke and cut the dorsum of her hand  Was seen at [address] [place] [address] yesterday, it was not sutured  Pt was given tylenol and keflex which she has been taking  Tetanus was updated  Pt presents today complaining of ongoing pain  She denies fevers, redness or drainage  Denies numbness or color change  Prior to Admission Medications   Prescriptions Last Dose Informant Patient Reported? Taking?   acetaminophen (TYLENOL) 325 mg tablet 2019 at Unknown time  No Yes   Sig: Take 2 tablets (650 mg total) by mouth every 6 (six) hours as needed for mild pain   cephalexin (KEFLEX) 500 mg capsule 2019 at Unknown time  No Yes   Sig: Take 1 capsule (500 mg total) by mouth every 8 (eight) hours for 10 days      Facility-Administered Medications: None       History reviewed  No pertinent past medical history  Past Surgical History:   Procedure Laterality Date     SECTION  2017       Family History   Problem Relation Age of Onset    No Known Problems Mother     No Known Problems Father     No Known Problems Sister     No Known Problems Brother     Diabetes Maternal Grandmother     No Known Problems Paternal Grandmother     No Known Problems Sister     No Known Problems Brother     No Known Problems Brother     No Known Problems Brother      I have reviewed and agree with the history as documented  Social History     Tobacco Use    Smoking status: Current Every Day Smoker     Packs/day: 0 20     Types: Cigarettes    Smokeless tobacco: Current User    Tobacco comment: smokes 4 cigarettes/day, says she wants to quit but is refusing counseling   Substance Use Topics    Alcohol use:  No  Drug use: No        Review of Systems   Skin: Positive for wound  All other systems reviewed and are negative  Physical Exam  Physical Exam   Constitutional: She appears well-developed and well-nourished  No distress  HENT:   Head: Normocephalic and atraumatic  Cardiovascular: Normal rate, regular rhythm and normal heart sounds  Pulmonary/Chest: Breath sounds normal  No stridor  No respiratory distress  Musculoskeletal: Normal range of motion  Neurological: She is alert  No sensory deficit  Skin: Skin is warm and dry  Capillary refill takes less than 2 seconds  She is not diaphoretic  No erythema  2-3cm skin flap to dorsum of right hand  Skin flap is intact and healing, unable to lift  No surrounding erythema or drainage  Psychiatric: She has a normal mood and affect  Her behavior is normal        Vital Signs  ED Triage Vitals   Temperature Pulse Respirations Blood Pressure SpO2   07/08/19 1821 07/08/19 1822 07/08/19 1822 07/08/19 1822 07/08/19 1822   (!) 97 2 °F (36 2 °C) 73 16 120/56 98 %      Temp Source Heart Rate Source Patient Position - Orthostatic VS BP Location FiO2 (%)   07/08/19 1821 07/08/19 1822 07/08/19 1822 07/08/19 1822 --   Temporal Monitor Sitting Right arm       Pain Score       --                  Vitals:    07/08/19 1822   BP: 120/56   Pulse: 73   Patient Position - Orthostatic VS: Sitting         Visual Acuity      ED Medications  Medications   neomycin-bacitracin-polymyxin b (NEOSPORIN) ointment 1 small application (1 small application Topical Given 7/8/19 1934)       Diagnostic Studies  Results Reviewed     None                 No orders to display              Procedures  Procedures       ED Course                               MDM  Number of Diagnoses or Management Options  Laceration of skin of right hand, initial encounter:   Diagnosis management comments: Will give wound care instructions and advise follow-up with PCP in 2-3 days for wound check   Pt is to complete entire course of abx, will rx motrin for pain  Disposition  Final diagnoses:   Laceration of skin of right hand, initial encounter     Time reflects when diagnosis was documented in both MDM as applicable and the Disposition within this note     Time User Action Codes Description Comment    7/8/2019  7:26 PM Morris, [address] [place] injury of left hand, initial encounter     7/8/2019  7:26 PM [address] [place] injury of left hand, initial encounter     7/8/2019  7:26 PM Bernestine Cluster Add [D88 112Y] Laceration of left hand     7/8/2019  7:26 PM Young, [address] Laceration of left hand     7/8/2019  7:27 PM Bernestine Cluster Add [M60 759K] Laceration of skin of right hand, initial encounter       ED Disposition     ED Disposition Condition Date/Time Comment    Discharge Stable Mon Jul 8, 2019  7:26 PM Kimberly Danuta discharge to home/self care  Follow-up Information     Follow up With Specialties Details Why JONO Tang Psychiatry, Physician Assistant Schedule an appointment as soon as possible for a visit in 3 days For wound re-check [address] [place] [address]  [phone]            Discharge Medication List as of 7/8/2019  7:27 PM      START taking these medications    Details   ibuprofen (MOTRIN) 600 mg tablet Take 1 tablet (600 mg total) by mouth every 6 (six) hours as needed for mild pain, Starting Mon 7/8/2019, Print         CONTINUE these medications which have NOT CHANGED    Details   acetaminophen (TYLENOL) 325 mg tablet Take 2 tablets (650 mg total) by mouth every 6 (six) hours as needed for mild pain, Starting Sun 7/7/2019, Print      cephalexin (KEFLEX) 500 mg capsule Take 1 capsule (500 mg total) by mouth every 8 (eight) hours for 10 days, Starting Sun 7/7/2019, Until Wed 7/17/2019, Print           No discharge procedures on file      ED Provider  Electronically Signed by Bret Demarco PA-C  07/08/19 1946

## 2019-07-10 ENCOUNTER — OFFICE VISIT (OUTPATIENT)
Dept: FAMILY MEDICINE CLINIC | Facility: CLINIC | Age: 29
End: 2019-07-10

## 2019-07-10 VITALS
DIASTOLIC BLOOD PRESSURE: 60 MMHG | HEIGHT: 61 IN | SYSTOLIC BLOOD PRESSURE: 104 MMHG | RESPIRATION RATE: 16 BRPM | BODY MASS INDEX: 30.78 KG/M2 | WEIGHT: 163 LBS | HEART RATE: 68 BPM | TEMPERATURE: 97.6 F | OXYGEN SATURATION: 98 %

## 2019-07-10 DIAGNOSIS — N64.4 BREAST TENDERNESS IN FEMALE: ICD-10-CM

## 2019-07-10 DIAGNOSIS — S61.411A LACERATION OF RIGHT HAND WITHOUT FOREIGN BODY, INITIAL ENCOUNTER: Primary | ICD-10-CM

## 2019-07-10 PROCEDURE — 3725F SCREEN DEPRESSION PERFORMED: CPT | Performed by: FAMILY MEDICINE

## 2019-07-10 PROCEDURE — 99213 OFFICE O/P EST LOW 20 MIN: CPT | Performed by: FAMILY MEDICINE

## 2019-07-10 NOTE — PROGRESS NOTES
Assessment/Plan:    Laceration of right hand without foreign body  Right hand laceration in right thumb and right index finger proximal area currently healing properly, with no symptoms of swelling, redness or tenderness noted  Patient currently completing cephalexin antibiotic treatment and pain controlled with Motrin  Patient encouraged to maintain the wound clean with proper hygiene and to notify the clinic if any symptoms recur  All questions were addressed, patient agreed and understood  Breast tenderness in female  Patient indicates symptoms of breast tenderness about two months ago that have not been present anymore  Even though patient currently with contraception implant, symptoms most likely due to menstrual cycle fluctuations for which patient was recommended to keep a log diary when symptoms present to evaluate if there is any correlation with menstrual cycle  If breast tenderness symptoms persists, will consider breast examination and evaluation in follow up visit  Subjective:      Patient ID: Clint Galvin is a 34 y o  female  Case of 34year old female who came to the office for follow up post- hand laceration  Patient indicates that this past Sunday she had an incident at home while washing dishes and had a laceration with a broken glass cup on her right hand  Patient went to multiple ED for evaluation, where the wound was examined, cleaned and provided with antibiotic Cephalexin and Motrin for pain which patient indicates has provided relief  Patient also indicates that she has had breast discomfort about 2 months ago, symptoms have relieved since then but she wanted to know about recommendations and treatment regarding the symptoms  Review of Systems   Constitutional: Negative for activity change  Respiratory: Negative for shortness of breath  Cardiovascular: Negative for chest pain  Gastrointestinal: Negative for abdominal distention  Skin: Positive for wound  Right hand laceration, small laceration in right thumb and larger laceration in right index finger proximal area         Objective:      /60 (BP Location: Right arm, Patient Position: Sitting, Cuff Size: Standard)   Pulse 68   Temp 97 6 °F (36 4 °C) (Temporal)   Resp 16   Ht 5' 1" (1 549 m)   Wt 73 9 kg (163 lb)   LMP  (LMP Unknown)   SpO2 98%   BMI 30 80 kg/m²          Physical Exam   Constitutional: She appears well-developed and well-nourished  No distress  Eyes: EOM are normal    Cardiovascular: Normal heart sounds  Pulmonary/Chest: Effort normal  No respiratory distress  Musculoskeletal: Normal range of motion  Right hand laceration, small laceration on right thumb that is healing well  Right index finger proximal area with larger laceration, gradually healing without any redness, tenderness, warmth or swelling  Neurological: She is alert  Skin: Skin is warm  She is not diaphoretic  Psychiatric: She has a normal mood and affect

## 2019-07-10 NOTE — ASSESSMENT & PLAN NOTE
Patient indicates symptoms of breast tenderness about two months ago that have not been present anymore  Even though patient currently with contraception implant, symptoms most likely due to menstrual cycle fluctuations for which patient was recommended to keep a log diary when symptoms present to evaluate if there is any correlation with menstrual cycle  If breast tenderness symptoms persists, will consider breast examination and evaluation in follow up visit

## 2019-07-10 NOTE — ASSESSMENT & PLAN NOTE
Right hand laceration in right thumb and right index finger proximal area currently healing properly, with no symptoms of swelling, redness or tenderness noted  Patient currently completing cephalexin antibiotic treatment and pain controlled with Motrin  Patient encouraged to maintain the wound clean with proper hygiene and to notify the clinic if any symptoms recur  All questions were addressed, patient agreed and understood

## 2019-08-24 ENCOUNTER — HOSPITAL ENCOUNTER (EMERGENCY)
Facility: HOSPITAL | Age: 29
Discharge: HOME | End: 2019-08-24
Attending: EMERGENCY MEDICINE | Admitting: EMERGENCY MEDICINE
Payer: COMMERCIAL

## 2019-08-24 VITALS
TEMPERATURE: 98.1 F | RESPIRATION RATE: 16 BRPM | OXYGEN SATURATION: 98 % | DIASTOLIC BLOOD PRESSURE: 59 MMHG | HEART RATE: 70 BPM | SYSTOLIC BLOOD PRESSURE: 114 MMHG

## 2019-08-24 DIAGNOSIS — N89.8 VAGINAL ITCHING: Primary | ICD-10-CM

## 2019-08-24 LAB
BACTERIA UR QL AUTO: ABNORMAL /HPF
BILIRUB UR QL STRIP: NEGATIVE
CLARITY UR: CLEAR
COLOR UR: YELLOW
EXT PREG TEST URINE: NEGATIVE
EXT. CONTROL ED NAV: NORMAL
GLUCOSE UR STRIP-MCNC: NEGATIVE MG/DL
HGB UR QL STRIP.AUTO: ABNORMAL
KETONES UR STRIP-MCNC: NEGATIVE MG/DL
LEUKOCYTE ESTERASE UR QL STRIP: ABNORMAL
NITRITE UR QL STRIP: NEGATIVE
NON-SQ EPI CELLS URNS QL MICRO: ABNORMAL /HPF
PH UR STRIP.AUTO: 6 [PH] (ref 4.5–8)
PROT UR STRIP-MCNC: NEGATIVE MG/DL
RBC #/AREA URNS AUTO: ABNORMAL /HPF
SP GR UR STRIP.AUTO: 1.02 (ref 1–1.03)
UROBILINOGEN UR QL STRIP.AUTO: 0.2 E.U./DL
WBC #/AREA URNS AUTO: ABNORMAL /HPF

## 2019-08-24 PROCEDURE — 81025 URINE PREGNANCY TEST: CPT | Performed by: EMERGENCY MEDICINE

## 2019-08-24 PROCEDURE — 87491 CHLMYD TRACH DNA AMP PROBE: CPT | Performed by: EMERGENCY MEDICINE

## 2019-08-24 PROCEDURE — 96372 THER/PROPH/DIAG INJ SC/IM: CPT

## 2019-08-24 PROCEDURE — 99283 EMERGENCY DEPT VISIT LOW MDM: CPT

## 2019-08-24 PROCEDURE — 87591 N.GONORRHOEAE DNA AMP PROB: CPT | Performed by: EMERGENCY MEDICINE

## 2019-08-24 PROCEDURE — 81001 URINALYSIS AUTO W/SCOPE: CPT

## 2019-08-24 PROCEDURE — 99284 EMERGENCY DEPT VISIT MOD MDM: CPT | Performed by: EMERGENCY MEDICINE

## 2019-08-24 RX ORDER — FLUCONAZOLE 150 MG/1
150 TABLET ORAL ONCE
Status: COMPLETED | OUTPATIENT
Start: 2019-08-24 | End: 2019-08-24

## 2019-08-24 RX ORDER — AZITHROMYCIN 250 MG/1
1000 TABLET, FILM COATED ORAL ONCE
Status: COMPLETED | OUTPATIENT
Start: 2019-08-24 | End: 2019-08-24

## 2019-08-24 RX ADMIN — AZITHROMYCIN 1000 MG: 250 TABLET, FILM COATED ORAL at 10:38

## 2019-08-24 RX ADMIN — FLUCONAZOLE 150 MG: 150 TABLET ORAL at 10:37

## 2019-08-24 RX ADMIN — LIDOCAINE HYDROCHLORIDE 250 MG: 10 INJECTION, SOLUTION EPIDURAL; INFILTRATION; INTRACAUDAL; PERINEURAL at 10:38

## 2019-08-24 NOTE — ED PROVIDER NOTES
History  Chief Complaint   Patient presents with    Vaginal Itching     Pt with c/o vaginal itching since this morning, states she had her period from -Thursday of this week, hasn't had it in a long time due to having the nexplanon implant  States she is having some brown discharge but that is normal for the end of her cycle  Denies urinary complaints, abdominal pain, or fever  A 61-year-old female with no significant past medical history; presents with vaginal itching that began this morning  Patient does report noticing a small amount of discharge in her urine this morning, however it has not persisted  Patient just finished her period two days ago, and still having spotting  Patient otherwise denies fever, chills, chest pain, shortness of breath, abdominal pain, nausea, vomiting, diarrhea, dysuria, flank pain, peripheral edema and rashes  Patient has had a history of vaginal itching in the past, however is unsure what her treatment included  Patient does report being sexually active  A/P:  Vaginal itching, dark brown blood appreciated within the vaginal vault however no discharge  GC/chlamydia obtained  Discussed empiric treatment versus waiting for results, however patient opts for receiving gonorrhea and chlamydia treatment today  Will also treat for possible developing yeast infection  Urine pregnancy has been obtained and is negative  History provided by:  Patient      None       History reviewed  No pertinent past medical history      Past Surgical History:   Procedure Laterality Date     SECTION  2017       Family History   Problem Relation Age of Onset    No Known Problems Mother     No Known Problems Father     No Known Problems Sister     No Known Problems Brother     Diabetes Maternal Grandmother     No Known Problems Paternal Grandmother     No Known Problems Sister     No Known Problems Brother     No Known Problems Brother     No Known Problems Brother      I have reviewed and agree with the history as documented  Social History     Tobacco Use    Smoking status: Former Smoker     Packs/day: 0 20     Types: Cigarettes    Smokeless tobacco: Current User    Tobacco comment: smokes 4 cigarettes/day, says she wants to quit but is refusing counseling   Substance Use Topics    Alcohol use: No    Drug use: No        Review of Systems   Genitourinary: Positive for vaginal discharge  All other systems reviewed and are negative  Physical Exam  Physical Exam  General Appearance: alert and oriented, nad, non toxic appearing  Skin:  Warm, dry, intact  HEENT: atraumatic, normocephalic  Neck: Supple, trachea midline  Cardiac: RRR; no murmurs, rub, gallops  Pulmonary: lungs CTAB; no wheezes, rales, rhonchi  Gastrointestinal: abdomen soft, nontender, nondistended; no guarding or rebound tenderness; good bowel sounds, no mass or bruits  Genitourinary: Chaperoned by ANUJ Frank  Dark brown blood noted within vaginal vault  No discharge appreciated    No CMT or adnexal tenderness on bimanual exam     Extremities:  no pedal edema, 2+ pulses; no calf tenderness, no clubbing, no cyanosis  Neuro:  no focal motor or sensory deficits, CN 2-12 grossly intact  Psych:  Normal mood and affect, normal judgement and insight      Vital Signs  ED Triage Vitals [08/24/19 0946]   Temperature Pulse Respirations Blood Pressure SpO2   98 1 °F (36 7 °C) 70 16 114/59 98 %      Temp Source Heart Rate Source Patient Position - Orthostatic VS BP Location FiO2 (%)   Oral Monitor Sitting Left arm --      Pain Score       --           Vitals:    08/24/19 0946   BP: 114/59   Pulse: 70   Patient Position - Orthostatic VS: Sitting         Visual Acuity      ED Medications  Medications   azithromycin (ZITHROMAX) tablet 1,000 mg (1,000 mg Oral Given 8/24/19 1038)   cefTRIAXone (ROCEPHIN) 250 mg in lidocaine (PF) (XYLOCAINE-MPF) 1 % IM only syringe (250 mg Intramuscular Given 8/24/19 1038) fluconazole (DIFLUCAN) tablet 150 mg (150 mg Oral Given 8/24/19 1037)       Diagnostic Studies  Results Reviewed     Procedure Component Value Units Date/Time    Chlamydia/GC amplified DNA by PCR [[id number]] Collected:  08/24/19 1024    Lab Status: In process Specimen:  Cervix Updated:  08/24/19 1026    Urine Microscopic [[id number]]  (Abnormal) Collected:  08/24/19 0959    Lab Status:  Final result Specimen:  Urine, Clean Catch Updated:  08/24/19 1022     RBC, UA 2-4 /hpf      WBC, UA 2-4 /hpf      Epithelial Cells Occasional /hpf      Bacteria, UA Occasional /hpf     POCT pregnancy, urine [[id number]]  (Normal) Resulted:  08/24/19 1003    Lab Status:  Final result Updated:  08/24/19 1004     EXT PREG TEST UR (Ref: Negative) negative     Control valid    ED Urine Macroscopic [[id number]]  (Abnormal) Collected:  08/24/19 0959    Lab Status:  Final result Specimen:  Urine Updated:  08/24/19 1000     Color, UA Yellow     Clarity, UA Clear     pH, UA 6 0     Leukocytes, UA Small     Nitrite, UA Negative     Protein, UA Negative mg/dl      Glucose, UA Negative mg/dl      Ketones, UA Negative mg/dl      Urobilinogen, UA 0 2 E U /dl      Bilirubin, UA Negative     Blood, UA Moderate     Specific Gravity, UA 1 025    Narrative:       [facility] RESULT                 No orders to display              Procedures  Procedures       ED Course                               MDM    Disposition  Final diagnoses:   Vaginal itching     Time reflects when diagnosis was documented in both MDM as applicable and the Disposition within this note     Time User Action Codes Description Comment    8/24/2019 10:26 AM Silvio Villarreal Add [N89 8] Vaginal itching       ED Disposition     ED Disposition Condition Date/Time Comment    Discharge Stable Sat Aug 24, 2019 10:26 AM Morena Harding discharge to home/self care              Follow-up Information     Follow up With Specialties Details Why Contact Info Additional Information    [facility] [address] [facility] Schedule an appointment as soon as possible for a visit in 1 week For re-evaluation [place]  [phone]  [address], [phone]          There are no discharge medications for this patient  No discharge procedures on file      ED Provider  Electronically Signed by           Alcira Olivera DO  08/24/19 6140

## 2019-08-26 LAB
C TRACH DNA SPEC QL NAA+PROBE: NEGATIVE
N GONORRHOEA DNA SPEC QL NAA+PROBE: NEGATIVE

## 2019-09-19 ENCOUNTER — OFFICE VISIT (OUTPATIENT)
Dept: FAMILY MEDICINE CLINIC | Facility: CLINIC | Age: 29
End: 2019-09-19

## 2019-09-19 VITALS
OXYGEN SATURATION: 98 % | TEMPERATURE: 97.2 F | SYSTOLIC BLOOD PRESSURE: 110 MMHG | BODY MASS INDEX: 30.43 KG/M2 | HEIGHT: 61 IN | DIASTOLIC BLOOD PRESSURE: 80 MMHG | WEIGHT: 161.2 LBS | HEART RATE: 86 BPM | RESPIRATION RATE: 16 BRPM

## 2019-09-19 DIAGNOSIS — R30.0 DYSURIA: Primary | ICD-10-CM

## 2019-09-19 DIAGNOSIS — N89.8 VAGINAL DISCHARGE: ICD-10-CM

## 2019-09-19 DIAGNOSIS — B37.3 VAGINITIS DUE TO CANDIDA: ICD-10-CM

## 2019-09-19 PROBLEM — B37.31 VAGINITIS DUE TO CANDIDA: Status: ACTIVE | Noted: 2019-09-19

## 2019-09-19 LAB
BILIRUB UR QL STRIP: NEGATIVE
CLARITY UR: CLEAR
COLOR UR: YELLOW
GLUCOSE UR STRIP-MCNC: NEGATIVE MG/DL
HGB UR QL STRIP.AUTO: NEGATIVE
KETONES UR STRIP-MCNC: NEGATIVE MG/DL
LEUKOCYTE ESTERASE UR QL STRIP: NEGATIVE
NITRITE UR QL STRIP: NEGATIVE
PH UR STRIP.AUTO: 6.5 [PH]
PROT UR STRIP-MCNC: NEGATIVE MG/DL
SL AMB  POCT GLUCOSE, UA: NEGATIVE
SL AMB LEUKOCYTE ESTERASE,UA: NEGATIVE
SL AMB POCT BILIRUBIN,UA: NEGATIVE
SL AMB POCT BLOOD,UA: ABNORMAL
SL AMB POCT CLARITY,UA: CLEAR
SL AMB POCT COLOR,UA: YELLOW
SL AMB POCT KETONES,UA: NEGATIVE
SL AMB POCT NITRITE,UA: NEGATIVE
SL AMB POCT PH,UA: 6
SL AMB POCT SPECIFIC GRAVITY,UA: 1.01
SL AMB POCT URINE PROTEIN: 15
SL AMB POCT UROBILINOGEN: 0.2
SP GR UR STRIP.AUTO: 1.02 (ref 1–1.03)
UROBILINOGEN UR QL STRIP.AUTO: 0.2 E.U./DL

## 2019-09-19 PROCEDURE — 87480 CANDIDA DNA DIR PROBE: CPT | Performed by: STUDENT IN AN ORGANIZED HEALTH CARE EDUCATION/TRAINING PROGRAM

## 2019-09-19 PROCEDURE — 87660 TRICHOMONAS VAGIN DIR PROBE: CPT | Performed by: STUDENT IN AN ORGANIZED HEALTH CARE EDUCATION/TRAINING PROGRAM

## 2019-09-19 PROCEDURE — 81003 URINALYSIS AUTO W/O SCOPE: CPT | Performed by: STUDENT IN AN ORGANIZED HEALTH CARE EDUCATION/TRAINING PROGRAM

## 2019-09-19 PROCEDURE — 87510 GARDNER VAG DNA DIR PROBE: CPT | Performed by: STUDENT IN AN ORGANIZED HEALTH CARE EDUCATION/TRAINING PROGRAM

## 2019-09-19 PROCEDURE — 99213 OFFICE O/P EST LOW 20 MIN: CPT | Performed by: FAMILY MEDICINE

## 2019-09-19 PROCEDURE — 81002 URINALYSIS NONAUTO W/O SCOPE: CPT | Performed by: FAMILY MEDICINE

## 2019-09-19 RX ORDER — FLUCONAZOLE 150 MG/1
150 TABLET ORAL ONCE
Qty: 1 TABLET | Refills: 1 | Status: SHIPPED | OUTPATIENT
Start: 2019-09-19 | End: 2019-09-19

## 2019-09-19 RX ORDER — NYSTATIN 100000 U/G
OINTMENT TOPICAL 2 TIMES DAILY
Qty: 30 G | Refills: 0 | Status: SHIPPED | OUTPATIENT
Start: 2019-09-19 | End: 2019-10-05

## 2019-09-19 NOTE — PROGRESS NOTES
Assessment/Plan:    Vaginitis due to Candida  Recurrent s/p one dose of diflucan in ED, was also treated with azithromycin and Rocephin  Heavenly to be candida   Budding  hyphae on microscope  Diflucan 150 mg 1 tablet  Nystatin cream twice daily for 3 days  history of chlamydia infection that was treated and retested negative, multiple sexual partners, possible reinfection  If symptoms do not improve in 1 week to return to office with evaluation or treatment with diflucan over  th phone after 72 hours   Bacteria vaginosis probe sent as well Chlamydia and gonorrhea  dicussed with patient use protection and if lab is positive partner may be treated  Diagnoses and all orders for this visit:    Dysuria  -     POCT urine dip  -     UA w Reflex to Microscopic w Reflex to Culture - Clinic Collect    Vaginitis due to Candida  -     fluconazole (DIFLUCAN) 150 mg tablet; Take 1 tablet (150 mg total) by mouth once for 1 dose  -     nystatin (MYCOSTATIN) ointment; Apply topically 2 (two) times a day for 3 days  -     VAGINOSIS DNA PROBE (AFFIRM)    Vaginal discharge  -     Chlamydia/GC amplified DNA by PCR          Subjective:      Patient ID: Malika Mc is a 34 y o  female  A 44-year-old female with no significant past medical history; presents with vaginal itching that began 8/23/19  Patient does report noticing a small amount of white and clear discharge on underware  No pain or discomfort with urination  Patient has her period last month, but not this month as she has next month  Placed in 10/29/18 years ago  Patient otherwise denies fever, chills, chest pain, shortness of breath, abdominal pain, nausea, vomiting, diarrhea, dysuria, flank pain, peripheral edema and rashes  Patient has had a history of vaginal itching in the past, however is unsure what her treatment included    Patient does report being sexually active with a new partner patient received G/C chelmdia treamnet 8/23/19 and symtpoms persisted  Vaginal itching, dark brown blood appreciated within the vaginal vault however no discharge  GC/chlamydia obtained  Discussed empiric treatment versus waiting for results, however patient opts for receiving gonorrhea and chlamydia treatment today  azithromycin (ZITHROMAX) tablet 1,000 mg (1,000 mg Oral Given 8/24/19 1038)  cefTRIAXone (ROCEPHIN) 250 mg in lidocaine (PF) (XYLOCAINE-MPF) 1 % IM only syringe (250 mg Intramuscular Given 8/24/19 1038)  fluconazole (DIFLUCAN) tablet 150 mg (150 mg Oral Given 8/24/19 1037)    Urine pregnancy has been obtained and is negative  NA at bed side with exam     Vaginal Itching   The patient's primary symptoms include genital itching and vaginal discharge  The patient's pertinent negatives include no genital odor, genital rash, pelvic pain or vaginal bleeding  This is a recurrent problem  Episode onset: 8/23/19  The problem has been unchanged  The patient is experiencing no pain  Pertinent negatives include no abdominal pain, anorexia, constipation, diarrhea, discolored urine, dysuria, fever, flank pain, frequency, headaches, hematuria, joint pain, nausea, painful intercourse, rash, sore throat, urgency or vomiting  The vaginal discharge was white  There has been no bleeding  She has tried antibiotics and antifungals for the symptoms  The treatment provided no relief  She is sexually active with multiple partners  No, her partner does not have an STD  Contraceptive use: nexpanon  Her past medical history is significant for an STD and vaginosis  Vaginal Discharge   The patient's primary symptoms include genital itching and vaginal discharge  The patient's pertinent negatives include no genital odor, genital rash, pelvic pain or vaginal bleeding  This is a recurrent problem  The patient is experiencing no pain   Pertinent negatives include no abdominal pain, anorexia, constipation, diarrhea, discolored urine, dysuria, fever, flank pain, frequency, headaches, hematuria, joint pain, nausea, painful intercourse, rash, sore throat, urgency or vomiting  The vaginal discharge was white  Nothing aggravates the symptoms  She has tried antibiotics and antifungals for the symptoms  The treatment provided no relief  She is sexually active with multiple partners  Yes, her partner has an STD  Her past medical history is significant for an STD and vaginosis  The following portions of the patient's history were reviewed and updated as appropriate: She  has no past medical history on file  Patient Active Problem List    Diagnosis Date Noted    Vaginitis due to Candida 2019    Laceration of right hand without foreign body 07/10/2019    Breast tenderness in female 07/10/2019    Bacterial conjunctivitis of right eye 10/09/2018    Acute vaginitis 10/09/2018    Encounter for counseling regarding contraception 2018    Hemorrhoids 01/15/2018     She  has a past surgical history that includes  section (2017)  Her family history includes Diabetes in her maternal grandmother; No Known Problems in her brother, brother, brother, brother, father, mother, paternal grandmother, sister, and sister  She  reports that she quit smoking about 20 months ago  Her smoking use included cigarettes  She smoked 0 20 packs per day  She has never used smokeless tobacco  She reports that she drank alcohol  She reports that she does not use drugs  Current Outpatient Medications   Medication Sig Dispense Refill    fluconazole (DIFLUCAN) 150 mg tablet Take 1 tablet (150 mg total) by mouth once for 1 dose 1 tablet 1    nystatin (MYCOSTATIN) ointment Apply topically 2 (two) times a day for 3 days 30 g 0     No current facility-administered medications for this visit  No current outpatient medications on file prior to visit  No current facility-administered medications on file prior to visit  She is allergic to meperidine       Review of Systems   Constitutional: Negative for fever  HENT: Negative for sore throat  Gastrointestinal: Negative for abdominal pain, anorexia, constipation, diarrhea, nausea and vomiting  Genitourinary: Positive for vaginal discharge  Negative for dysuria, flank pain, frequency, hematuria, pelvic pain and urgency  Musculoskeletal: Negative for joint pain  Skin: Negative for rash  Neurological: Negative for headaches  Objective:      /80 (BP Location: Left arm, Patient Position: Sitting, Cuff Size: Adult)   Pulse 86   Temp (!) 97 2 °F (36 2 °C) (Tympanic)   Resp 16   Ht 5' 1" (1 549 m)   Wt 73 1 kg (161 lb 3 2 oz)   SpO2 98%   BMI 30 46 kg/m²          Physical Exam   Cardiovascular: Normal rate, regular rhythm and normal heart sounds  Pulmonary/Chest: Effort normal and breath sounds normal    Abdominal: Soft  Bowel sounds are normal    Genitourinary: Uterus normal  Pelvic exam was performed with patient supine  No labial fusion  There is no rash, tenderness, lesion or injury on the right labia  There is no rash, tenderness, lesion or injury on the left labia  Cervix exhibits discharge (white thin discharge )  Cervix exhibits no motion tenderness  Right adnexum displays no mass and no tenderness  Left adnexum displays no mass and no tenderness  No erythema or tenderness in the vagina  No foreign body in the vagina  No signs of injury around the vagina  Vaginal discharge (white thin dischrge ) found  Lymphadenopathy:        Right: No inguinal adenopathy present  Left: No inguinal adenopathy present

## 2019-09-19 NOTE — ASSESSMENT & PLAN NOTE
Recurrent s/p one dose of diflucan in ED, was also treated with azithromycin and Rocephin  Heavenly to be candida   Budding  hyphae on microscope  Diflucan 150 mg 1 tablet  Nystatin cream twice daily for 3 days  history of chlamydia infection that was treated and retested negative, multiple sexual partners, possible reinfection  If symptoms do not improve in 1 week to return to office with evaluation or treatment with diflucan over  th phone after 72 hours   Bacteria vaginosis probe sent as well Chlamydia and gonorrhea  dicussed with patient use protection and if lab is positive partner may be treated

## 2019-09-21 ENCOUNTER — HOSPITAL ENCOUNTER (EMERGENCY)
Facility: HOSPITAL | Age: 29
Discharge: HOME | End: 2019-09-21
Attending: EMERGENCY MEDICINE
Payer: COMMERCIAL

## 2019-09-21 VITALS
SYSTOLIC BLOOD PRESSURE: 123 MMHG | DIASTOLIC BLOOD PRESSURE: 56 MMHG | RESPIRATION RATE: 14 BRPM | OXYGEN SATURATION: 98 % | HEART RATE: 79 BPM | BODY MASS INDEX: 30.16 KG/M2 | WEIGHT: 159.61 LBS | TEMPERATURE: 97.3 F

## 2019-09-21 DIAGNOSIS — N89.8 VAGINAL ITCHING: Primary | ICD-10-CM

## 2019-09-21 DIAGNOSIS — N89.8 VAGINAL DISCHARGE: ICD-10-CM

## 2019-09-21 LAB
BACTERIA UR QL AUTO: ABNORMAL /HPF
BILIRUB UR QL STRIP: NEGATIVE
CANDIDA RRNA VAG QL PROBE: POSITIVE
CLARITY UR: ABNORMAL
COLOR UR: YELLOW
EXT PREG TEST URINE: NEGATIVE
EXT. CONTROL ED NAV: NORMAL
G VAGINALIS RRNA GENITAL QL PROBE: NEGATIVE
GLUCOSE UR STRIP-MCNC: NEGATIVE MG/DL
HGB UR QL STRIP.AUTO: 10
KETONES UR STRIP-MCNC: NEGATIVE MG/DL
LEUKOCYTE ESTERASE UR QL STRIP: 100
NITRITE UR QL STRIP: NEGATIVE
NON-SQ EPI CELLS URNS QL MICRO: ABNORMAL /HPF
PH UR STRIP.AUTO: 6 [PH]
PROT UR STRIP-MCNC: NEGATIVE MG/DL
RBC #/AREA URNS AUTO: ABNORMAL /HPF
SP GR UR STRIP.AUTO: 1.01 (ref 1–1.04)
T VAGINALIS RRNA GENITAL QL PROBE: NEGATIVE
UROBILINOGEN UA: NEGATIVE MG/DL
WBC #/AREA URNS AUTO: ABNORMAL /HPF

## 2019-09-21 PROCEDURE — 87491 CHLMYD TRACH DNA AMP PROBE: CPT | Performed by: PHYSICIAN ASSISTANT

## 2019-09-21 PROCEDURE — 99283 EMERGENCY DEPT VISIT LOW MDM: CPT | Performed by: PHYSICIAN ASSISTANT

## 2019-09-21 PROCEDURE — 99283 EMERGENCY DEPT VISIT LOW MDM: CPT

## 2019-09-21 PROCEDURE — 96372 THER/PROPH/DIAG INJ SC/IM: CPT

## 2019-09-21 PROCEDURE — 81001 URINALYSIS AUTO W/SCOPE: CPT | Performed by: PHYSICIAN ASSISTANT

## 2019-09-21 PROCEDURE — 81025 URINE PREGNANCY TEST: CPT | Performed by: PHYSICIAN ASSISTANT

## 2019-09-21 PROCEDURE — 81003 URINALYSIS AUTO W/O SCOPE: CPT | Performed by: PHYSICIAN ASSISTANT

## 2019-09-21 PROCEDURE — 87591 N.GONORRHOEAE DNA AMP PROB: CPT | Performed by: PHYSICIAN ASSISTANT

## 2019-09-21 RX ORDER — AZITHROMYCIN 250 MG/1
1000 TABLET, FILM COATED ORAL ONCE
Status: COMPLETED | OUTPATIENT
Start: 2019-09-21 | End: 2019-09-21

## 2019-09-21 RX ORDER — FLUCONAZOLE 100 MG/1
200 TABLET ORAL ONCE
Status: COMPLETED | OUTPATIENT
Start: 2019-09-21 | End: 2019-09-21

## 2019-09-21 RX ADMIN — CEFTRIAXONE SODIUM 250 MG: 250 INJECTION, POWDER, FOR SOLUTION INTRAMUSCULAR; INTRAVENOUS at 09:47

## 2019-09-21 RX ADMIN — AZITHROMYCIN 1000 MG: 250 TABLET, FILM COATED ORAL at 09:46

## 2019-09-21 RX ADMIN — FLUCONAZOLE 200 MG: 100 TABLET ORAL at 09:46

## 2019-09-21 NOTE — ED PROVIDER NOTES
History  Chief Complaint   Patient presents with    Vaginal Itching     vaginal itching for about 2 days  staets she was given a pill and nystatin ointment by PCP for the same with little relief     53-year-old female with history of Chlamydia, presents for evaluation of vaginal itching for the past 3 days  She also reports of having white thick vaginal discharge  Patient reports that she was seen by her family care provider and was given nystatin cream and Diflucan  Patient reports that she has not improvement of the symptoms  Patient states that she has itching around the outer aspect of her labia  Patient states that she is sexually active with 1 partner but does not use protection  Reports the last time she had intercourse was approximately 3 4 days ago  Patient also reports a last menstrual period was last month, normal   Patient states that she does not think she is pregnant  Patient reports that she has concern for STDs, because she does not know what could be  Was seen at her family care provider office and had a vaginal doses probe which results are still pending  Patient states that she denies any fever, chills, nausea, vomiting, abdominal pain  She denies dysuria, hematuria, diarrhea constipation  Prior to Admission Medications   Prescriptions Last Dose Informant Patient Reported? Taking?   nystatin (MYCOSTATIN) ointment   No No   Sig: Apply topically 2 (two) times a day for 3 days      Facility-Administered Medications: None       History reviewed  No pertinent past medical history      Past Surgical History:   Procedure Laterality Date     SECTION  2017       Family History   Problem Relation Age of Onset    No Known Problems Mother     No Known Problems Father     No Known Problems Sister     No Known Problems Brother     Diabetes Maternal Grandmother     No Known Problems Paternal Grandmother     No Known Problems Sister     No Known Problems Brother     No Known Problems Brother     No Known Problems Brother      I have reviewed and agree with the history as documented  Social History     Tobacco Use    Smoking status: Former Smoker     Packs/day: 0 20     Types: Cigarettes     Last attempt to quit: 2018     Years since quittin 7    Smokeless tobacco: Never Used   Substance Use Topics    Alcohol use: Not Currently     Frequency: Never    Drug use: Never        Review of Systems   Constitutional: Negative for chills and fever  Gastrointestinal: Negative for abdominal pain, constipation, diarrhea, nausea and vomiting  Genitourinary: Positive for vaginal discharge  Negative for difficulty urinating, dysuria, flank pain, frequency, genital sores, hematuria, menstrual problem, pelvic pain, vaginal bleeding and vaginal pain  Physical Exam  Physical Exam   Constitutional: She appears well-developed and well-nourished  No distress  Cardiovascular: Normal rate and normal heart sounds  Pulmonary/Chest: Effort normal and breath sounds normal    Abdominal: Soft  Bowel sounds are normal  There is no tenderness  There is no guarding  Genitourinary: Pelvic exam was performed with patient supine  There is erythema in the vagina  No tenderness or bleeding in the vagina  Vaginal discharge found  Genitourinary Comments: ED female nurse present during exam:  External genitalia:  Excoriations and mild redness noted around the labia minora and majora  No active discharge noted  Internal exam:  Noted to have thick white discharge within the vaginal canal   No bleeding noted  Neurological: She is alert  Skin: Skin is warm  She is not diaphoretic  Vitals reviewed        Vital Signs  ED Triage Vitals [19 0852]   Temperature Pulse Respirations Blood Pressure SpO2   (!) 97 3 °F (36 3 °C) 79 14 123/56 98 %      Temp Source Heart Rate Source Patient Position - Orthostatic VS BP Location FiO2 (%)   Tympanic Monitor Sitting Left arm --      Pain Score 5           Vitals:    09/21/19 0852   BP: 123/56   Pulse: 79   Patient Position - Orthostatic VS: Sitting         Visual Acuity      ED Medications  Medications   azithromycin (ZITHROMAX) tablet 1,000 mg (1,000 mg Oral Given 9/21/19 0946)   cefTRIAXone (ROCEPHIN) 250 mg in sterile water IM only syringe (250 mg Intramuscular Given 9/21/19 0947)   fluconazole (DIFLUCAN) tablet 200 mg (200 mg Oral Given 9/21/19 0946)       Diagnostic Studies  Results Reviewed     Procedure Component Value Units Date/Time    Urine Microscopic [[id number]]  (Abnormal) Collected:  09/21/19 1038    Lab Status:  Final result Specimen:  Urine, Clean Catch Updated:  09/21/19 1140     RBC, UA None Seen /hpf      WBC, UA 2-4 /hpf      Epithelial Cells Moderate /hpf      Bacteria, UA Moderate /hpf     UA w Reflex to Microscopic w Reflex to Culture [[id number]]  (Abnormal) Collected:  09/21/19 1038    Lab Status:  Final result Specimen:  Urine, Clean Catch Updated:  09/21/19 1131     Color, UA Yellow     Clarity, UA Cloudy     Specific Gravity, UA 1 015     pH, UA 6 0     Leukocytes,  0     Nitrite, UA Negative     Protein, UA Negative mg/dl      Glucose, UA Negative mg/dl      Ketones, UA Negative mg/dl      Bilirubin, UA Negative     Blood, UA 10 0     UROBILINOGEN UA Negative mg/dL     Chlamydia/GC amplified DNA by PCR [[id number]] Collected:  09/21/19 0941    Lab Status: In process Specimen:  Urine, Other Updated:  09/21/19 1124    POCT pregnancy, urine [[id number]]  (Normal) Resulted:  09/21/19 0940    Lab Status:  Final result Updated:  09/21/19 0940     EXT PREG TEST UR (Ref: Negative) negative     Control valid                 No orders to display              Procedures  Procedures       ED Course                               MDM  Number of Diagnoses or Management Options  Diagnosis management comments: 24-year-old female presents for evaluation of vaginal discharge and itching past 3 days  Well-appearing, vital stable    Patient is taking nystatin and took Diflucan 2 days ago family care provider's office  Still pending on vaginosis probe result  Patient states that she would still like to be treated again for gonorrhea and Chlamydia as she is unsure if she has it  Was treated and tested approximately 1 month ago on August 24th 2019  Was seen by her family care provider, looked under the microscope were it was likely candidiasis  We will treat with Diflucan 200 mg once here again, will discharge with Monistat cream       Disposition  Final diagnoses:   Vaginal itching   Vaginal discharge     Time reflects when diagnosis was documented in both MDM as applicable and the Disposition within this note     Time User Action Codes Description Comment    9/21/2019 11:24 AM Ruther Goods Add [N89 8] Vaginal itching     9/21/2019 11:24 AM Ruther Goods Add [N89 8] Vaginal discharge       ED Disposition     ED Disposition Condition Date/Time Comment    Discharge Stable Sat Sep 21, 2019 11:24 AM Tae Arnold discharge to home/self care              Follow-up Information     Follow up With Specialties Details Why Contact Info Additional Information    [address] [facility]  Follow up with your pcp in 1 week if symptoms persist  [address], [address] [facility] [place] [address] [id number]  [facility] [phone] [facility], [address], [address], [place], [place], [address] [facility] Ob[facility]etric[facility] Gynecology   [address]  [address] [facility]  [address]  [phone]             Discharge Medication List as of 9/21/2019 11:29 AM      START taking these medications    Details   miconazole (MONISTAT-7) 2 % vaginal cream Insert 1 applicator into the vagina daily at bedtime for 7 days, Starting Sat 9/21/2019, Until Sat 9/28/2019, Normal         CONTINUE these medications which have NOT CHANGED    Details   nystatin (MYCOSTATIN) ointment Apply topically 2 (two) times a day for 3 days, Starting Thu 9/19/2019, Until Sun 9/22/2019, Normal           No discharge procedures on file      ED Provider  Electronically Signed by           Nano Valiente PA-C  09/21/19 8149

## 2019-09-24 LAB
C TRACH DNA SPEC QL NAA+PROBE: NEGATIVE
N GONORRHOEA DNA SPEC QL NAA+PROBE: NEGATIVE

## 2019-10-05 ENCOUNTER — HOSPITAL ENCOUNTER (EMERGENCY)
Facility: HOSPITAL | Age: 29
Discharge: HOME | End: 2019-10-05
Attending: EMERGENCY MEDICINE
Payer: COMMERCIAL

## 2019-10-05 VITALS
HEART RATE: 64 BPM | OXYGEN SATURATION: 97 % | RESPIRATION RATE: 16 BRPM | DIASTOLIC BLOOD PRESSURE: 56 MMHG | TEMPERATURE: 97.9 F | SYSTOLIC BLOOD PRESSURE: 116 MMHG

## 2019-10-05 DIAGNOSIS — M67.439 GANGLION CYST OF WRIST: ICD-10-CM

## 2019-10-05 DIAGNOSIS — M79.601 BILATERAL ARM PAIN: Primary | ICD-10-CM

## 2019-10-05 DIAGNOSIS — M79.602 BILATERAL ARM PAIN: Primary | ICD-10-CM

## 2019-10-05 PROCEDURE — 99283 EMERGENCY DEPT VISIT LOW MDM: CPT

## 2019-10-05 PROCEDURE — 99283 EMERGENCY DEPT VISIT LOW MDM: CPT | Performed by: PHYSICIAN ASSISTANT

## 2019-10-05 RX ORDER — NAPROXEN 500 MG/1
500 TABLET ORAL 2 TIMES DAILY WITH MEALS
Qty: 10 TABLET | Refills: 0 | Status: SHIPPED | OUTPATIENT
Start: 2019-10-05 | End: 2019-10-09

## 2019-10-07 ENCOUNTER — TELEPHONE (OUTPATIENT)
Dept: FAMILY MEDICINE CLINIC | Facility: CLINIC | Age: 29
End: 2019-10-07

## 2019-10-07 NOTE — TELEPHONE ENCOUNTER
Ptient called stated she is at work and will contact the office when she is free to r/s appt of 10/9

## 2019-10-09 ENCOUNTER — OFFICE VISIT (OUTPATIENT)
Dept: FAMILY MEDICINE CLINIC | Facility: CLINIC | Age: 29
End: 2019-10-09

## 2019-10-09 VITALS
HEART RATE: 62 BPM | HEIGHT: 61 IN | WEIGHT: 165.5 LBS | SYSTOLIC BLOOD PRESSURE: 112 MMHG | TEMPERATURE: 97.2 F | OXYGEN SATURATION: 99 % | DIASTOLIC BLOOD PRESSURE: 68 MMHG | RESPIRATION RATE: 18 BRPM | BODY MASS INDEX: 31.25 KG/M2

## 2019-10-09 DIAGNOSIS — M54.12 CERVICAL RADICULOPATHY: Primary | ICD-10-CM

## 2019-10-09 PROCEDURE — 3008F BODY MASS INDEX DOCD: CPT | Performed by: PHYSICIAN ASSISTANT

## 2019-10-09 PROCEDURE — 99214 OFFICE O/P EST MOD 30 MIN: CPT | Performed by: PHYSICIAN ASSISTANT

## 2019-10-09 RX ORDER — PREDNISONE 10 MG/1
TABLET ORAL
Qty: 30 TABLET | Refills: 0 | Status: SHIPPED | OUTPATIENT
Start: 2019-10-09 | End: 2019-11-18 | Stop reason: ALTCHOICE

## 2019-10-09 RX ORDER — TRAMADOL HYDROCHLORIDE 50 MG/1
50 TABLET ORAL EVERY 8 HOURS PRN
Qty: 10 TABLET | Refills: 0 | Status: SHIPPED | OUTPATIENT
Start: 2019-10-09 | End: 2019-11-18 | Stop reason: ALTCHOICE

## 2019-10-09 NOTE — PATIENT INSTRUCTIONS
Radiculopatía cervical   LO QUE NECESITA SABER:   ¿Qué es la radiculopatía cervical?  La radiculopatía cervical es mateo condición muy dolorosa que sucede cuando se oprime o irrita chastity de los nervios de la columna vertebral    ¿Cuál es la causa de radiculopatía cervical?  Los cambios en las vértebras (huesos) y discos del inocente pueden aplicar presión en los nervios de la columna vertebral  Los discos son unos cojines esponjosos naturales que se encuentran entre las vértebras y permiten el movimiento de la columna  Lo siguiente puede provocar que se oprima un nervio:  · Un disco lesionado  podría ocurrir si un disco se aplana, se sale o mueve con el Archer  Erika Shy puede también provocar un daño en el disco     · La espondilosis cervical  es cuando la vértebra del inocente se rompe  Bryan normalmente ocurre conforme se avanza de edad  · Los crecimientos erika los tumores o quistes (bultos llenos de líquido) podrían desarrollarse y presionar el nervio  ¿Cuáles son los signos y síntomas de radiculopatía cervical?  El síntoma mas común es un dolor mindy que va desde el inocente hasta el brazo  Podría presentar dolor en el hombro, el pecho y la [place]  El dolor podría empeorar con el movimiento o cuando tose o estornuda  Usted también podría presentar alguno de los siguientes:  · Sensación de ardor y hormigueo en el inocente o brazo     · Adormecimiento o debilidad en el brazo o la mano que le dificulta agarrar objetos    · Lorenza de oscar  ¿Cómo se diagnostica la radiculopatía cervical?  El médico le preguntará cómo y cuándo comenzaron los síntomas  Él le va a presionar el inocente con cuidado para revisar si hay sensibilidad y [address] que no tienen la forma correcta  También va a revisarle los brazos y leoncio para jignesh si hay adormecimiento o debilidad  Puede presentar cualquiera de los siguientes signos o síntomas:  · Los exámenes de provocación  se realizan para revisar la respuesta a ciertos movimientos   El [employer] pedirá que mueva el inocnete, hombros y brazos de [address]  Algunos movimientos aumentaran los síntomas, mientras que otros lo harán sentirse mejor  · Elinore Emerson  es mateo imagen de los huesos y los tejidos en el inocente  · Imagen por resonancia magnética o tomografía computarizada  podrían utilizarse para coleman imágenes del inocente  Las imágenes pueden mostrar problemas y [employer] nervios, discos y Sánchez  Le podrían administrar un tinte para ayudar a que las imágenes se vean mejor  Dígale al médico si usted alguna vez ha tenido mateo reacción alérgica al tinte de [place]  No entre a la nasreen donde se realiza la resonancia magnética con algo de metal  El metal puede causar lesiones serias  Dígale al médico si usted tiene algo de metal por dentro o sobre barnes cuerpo  · A mateo electromiografía  también se le conoce erika EMG  Mateo EMG se realiza para examinar la función de los músculos y de los nervios que los controlan  Se colocan electrodos (cables) en el músculo que se va a examinar  Podrían conectarse agujas a los electrodos y colocarse en la piel  La actividad eléctrica de los músculos y nervios se mide por mateo máquina que se conecta a los electrodos  Los músculos se examinan mientras están en descanso y en movimiento  ¿Cuál es el tratamiento para la radiculopatía cervical?   · AINEs (Analgésicos antiinflamatorios no esteroides)  disminuyen la inflamación y el dolor  Deepak medicamento puede comprarse sin receta médica  Deepak medicamento puede causar sangrado estomacal o problemas en los riñones en ciertas personas  Si usted gerardo un medicamento anticoagulante, asegúrese de preguntarle a barnes médico si los AUTUMN son seguros para usted  Jenny siempre la etiqueta y siga cuidadosamente las instrucciones antes de usar deepak medicamento  · Un medicamento con receta para el dolor  se pueden administrar para disminuir el dolor   No espere hasta que el dolor sea severo antes de coleman Dee medicamento  · Esteroides  contribuyen a aliviar el dolor y bajar la inflamación  Estos podrían administrarse erika píldora o erika inyección en el inocente  Es posible que usted necesite mas de 1 inyección si los síntomas no mejoran después del primer tratamiento  · Fisioterapia  ayuda a estirar y [employer]  El fisioterapeuta puede enseñarle a mejorar la postura y la forma de sostener el inocente  También le podría enseñar a estar activo de mateo forma payne y a evitar lesiones futuras  Además le puede ayudar a desarrollar un plan de ejercicios que sea seguro para la espalda y el inocente  · Cirugía  podría utilizarse para tratar al Alyssa Dears que está oprimido si los otros tratamientos no funcionan después de 6 a 12 meses  ¿Cómo puedo controlar los síntomas? · El hielo  ayuda a disminuir la inflamación y el dolor  El hielo también puede contribuir a evitar el daño de los tejidos  Use un paquete de hielo o ponga hielo molido dentro de [employer]  Cúbrala con mateo toalla y colóquela en el inocente por 15 a 20 minutos cada hora o erika se le indique  · Descanse  cuando sienta que es necesario  Empiece a hacer un poco más día a día  Regrese a thelma actividades diarias erika se le haya indicado  · Use un collarín suave  Es posible que le den un collarín suave para sostener el inocente mientras duerme  Use el collarín solamente erika se lo indiquen  · Realice estiramientos suaves y ejercicio regularmente  El médico podría recomendarle que alida ejercicios de elongación suaves para ayudar a disminuir la rigidez en el inocente y brazo Cheikh se recupera  Después de controlar el dolor, usted se podría beneficiar de hacer ejercicio a diario  Pregúntele que clase de ejercicios son seguros para Ekta Hernandez y el inocente  · 2017 Kota Santana  Un área de trabajo cómoda puede ayudarle a evitar tensión en el inocente   Pregúntele a barnes empleador que realice mateo revisión ergonómica para revisar la posición del escritorio, silla, teléfono y computadora  Marcos cualquier ajuste necesario para klein comodidad  ¿Cuándo aquilino comunicarme con mi médico?   · Usted pierde peso sin proponérselo  · El dolor es peor, aún con el medicamento  · [address] se sienten más adormecidas que antes, o no [place] dedos del todo  · Usted tiene preguntas o inquietudes acerca de klein condición o cuidado  ACUERDOS SOBRE KLEIN CUIDADO:   Usted tiene el derecho de ayudar a planear klein cuidado  Aprenda todo lo que pueda sobre klein condición y erika darle tratamiento  Discuta thelma opciones de tratamiento con thelma médicos para decidir el cuidado que usted desea recibir  Usted siempre tiene el derecho de rechazar el tratamiento  Esta información es sólo para uso en educación  Klein intención no es darle un consejo médico sobre enfermedades o tratamientos  Colsulte con klein Nichole Shirley farmacéutico antes de seguir cualquier régimen médico para saber si es seguro y efectivo para usted  © 2017 [address] Information is for End User's use only and may not be sold, redistributed or otherwise used for commercial purposes  All illustrations and images included in CareNotes® are the copyrighted property of A D A M , Inc  or Patricio Reynolds

## 2019-10-09 NOTE — PROGRESS NOTES
Assessment/Plan:    No problem-specific Assessment & Plan notes found for this encounter  Problem List Items Addressed This Visit     None      Visit Diagnoses     Cervical radiculopathy    -  Primary    Relevant Medications    traMADol (ULTRAM) 50 mg tablet    predniSONE 10 mg tablet    Other Relevant Orders    Ambulatory referral to Physical Therapy            Subjective:      Patient ID: Duong Beaulieu is a 34 y o  female  HPI  61-year-old female here for follow-up of  bilateral arm pain  This started without injury and no PMH of neck, shoulder or arm pain  She was seen the emergency room on 10/5 for this  Patient was prescribed naproxen and it did not help and she has been up all night with pain  No injury  She gets tingling in both hands   She did not have neck pain when seen in the ED but does now  She has tried multiple OTC medications without relief and the pain has not improved  It is moderate to severe in intensity  The following portions of the patient's history were reviewed and updated as appropriate:   She  has no past medical history on file  She   Patient Active Problem List    Diagnosis Date Noted    Vaginitis due to Candida 2019    Laceration of right hand without foreign body 07/10/2019    Breast tenderness in female 07/10/2019    Bacterial conjunctivitis of right eye 10/09/2018    Acute vaginitis 10/09/2018    Encounter for counseling regarding contraception 2018    Hemorrhoids 01/15/2018     She  has a past surgical history that includes  section (2017)  Her family history includes Diabetes in her maternal grandmother; No Known Problems in her brother, brother, brother, brother, father, mother, paternal grandmother, sister, and sister  She  reports that she quit smoking about 21 months ago  Her smoking use included cigarettes  She smoked 0 20 packs per day  She has never used smokeless tobacco  She reports that she drank alcohol   She reports that she does not use drugs  Current Outpatient Medications   Medication Sig Dispense Refill    predniSONE 10 mg tablet Take 4 pills for 4 days, 3 pills for 3 days, 2 pills for 2 days and 1 pill on last day 30 tablet 0    traMADol (ULTRAM) 50 mg tablet Take 1 tablet (50 mg total) by mouth every 8 (eight) hours as needed for moderate pain 10 tablet 0     No current facility-administered medications for this visit  Current Outpatient Medications on File Prior to Visit   Medication Sig    [DISCONTINUED] naproxen (NAPROSYN) 500 mg tablet Take 1 tablet (500 mg total) by mouth 2 (two) times a day with meals for 5 days     No current facility-administered medications on file prior to visit  She is allergic to meperidine       Review of Systems   Constitutional: Negative for activity change, appetite change, chills, fatigue and unexpected weight change  HENT: Negative for dental problem, ear pain, hearing loss and sore throat  Eyes: Negative for visual disturbance  Respiratory: Negative for cough and wheezing  Cardiovascular: Negative for chest pain  Gastrointestinal: Negative for abdominal pain, constipation, diarrhea and vomiting  Genitourinary: Negative for difficulty urinating and dysuria  Musculoskeletal: Positive for neck pain and neck stiffness  Negative for arthralgias, back pain and myalgias  Skin: Negative for rash  Neurological: Positive for numbness  Negative for dizziness and headaches  Psychiatric/Behavioral: Negative for behavioral problems  Objective:      /68 (BP Location: Left arm, Patient Position: Sitting, Cuff Size: Standard)   Pulse 62   Temp (!) 97 2 °F (36 2 °C) (Temporal)   Resp 18   Ht 5' 1" (1 549 m)   Wt 75 1 kg (165 lb 8 oz)   SpO2 99%   Breastfeeding? No   BMI 31 27 kg/m²          Physical Exam   Constitutional: She is oriented to person, place, and time  She appears well-developed and well-nourished  No distress     HENT:   Head: Normocephalic and atraumatic  Right Ear: External ear normal    Left Ear: External ear normal    Eyes: Conjunctivae are normal    Neck: Normal range of motion  Neck supple  No thyromegaly present  Cardiovascular: Normal rate, regular rhythm and normal heart sounds  No murmur heard  Pulmonary/Chest: Effort normal and breath sounds normal  No respiratory distress  She has no wheezes  Musculoskeletal: She exhibits tenderness  She exhibits no edema or deformity  Decreased ROM cervical spine and shoulders  Lymphadenopathy:     She has no cervical adenopathy  Neurological: She is alert and oriented to person, place, and time  Psychiatric: She has a normal mood and affect  Her behavior is normal    Nursing note and vitals reviewed

## 2019-10-21 NOTE — ED PROVIDER NOTES
History  Chief Complaint   Patient presents with    Arm Pain     Czech speaking [id number]  Reports b/l arm pain x 3 days that is not allowing her to sleep  Denies injury or accidents  Reports pain worsens at night   Wrist Pain     Reports "lump" on left wrist for months  34year old female presents today complaining of bilateral arm pain  Started a new job a few days ago, works at [employer]  The pain started the day after she started her job  No injuries  Symptoms are worse at night when she gets home from work  The pain does not radiate  She has no history of arm pain  She has not taken anything for her symptoms  Pt also reports cyst to left wrist which she has had for months to years  Denies change in color or sensation  No headache, neck pain, chest pain, shortness of breath, abdominal pain, nausea, vomiting, diarrhea, urinary symptoms  None       History reviewed  No pertinent past medical history  Past Surgical History:   Procedure Laterality Date     SECTION  2017       Family History   Problem Relation Age of Onset    No Known Problems Mother     No Known Problems Father     No Known Problems Sister     No Known Problems Brother     Diabetes Maternal Grandmother     No Known Problems Paternal Grandmother     No Known Problems Sister     No Known Problems Brother     No Known Problems Brother     No Known Problems Brother      I have reviewed and agree with the history as documented  Social History     Tobacco Use    Smoking status: Former Smoker     Packs/day: 0 20     Types: Cigarettes     Last attempt to quit: 2018     Years since quittin 8    Smokeless tobacco: Never Used   Substance Use Topics    Alcohol use: Not Currently     Frequency: Never    Drug use: Never        Review of Systems   Musculoskeletal: Positive for myalgias  All other systems reviewed and are negative        Physical Exam  Physical Exam   Constitutional: She appears well-developed and well-nourished  No distress  HENT:   Head: Normocephalic and atraumatic  Eyes: Conjunctivae are normal    Cardiovascular: Normal rate and regular rhythm  Pulmonary/Chest: Effort normal  No stridor  No respiratory distress  She has no wheezes  Abdominal: Soft  She exhibits no distension  Musculoskeletal:   Bilateral upper extremities nontender to palpation  No bony tenderness  Full range of motion  Neurovascularly intact  No lesions or color change  Palpable radial pulses  Brisk cap refill  Small, (3mm) firm mass palpable on left wrist  No erythema or tenderness to palpation  No fluctuance  Neurological: She is alert  No sensory deficit  Skin: Skin is warm and dry  Capillary refill takes less than 2 seconds  No rash noted  She is not diaphoretic  No erythema  Psychiatric: She has a normal mood and affect         Vital Signs  ED Triage Vitals [10/05/19 1049]   Temperature Pulse Respirations Blood Pressure SpO2   97 9 °F (36 6 °C) 64 16 116/56 97 %      Temp Source Heart Rate Source Patient Position - Orthostatic VS BP Location FiO2 (%)   Oral Monitor Sitting Right arm --      Pain Score       6           Vitals:    10/05/19 1049   BP: 116/56   Pulse: 64   Patient Position - Orthostatic VS: Sitting         Visual Acuity      ED Medications  Medications - No data to display    Diagnostic Studies  Results Reviewed     None                 No orders to display              Procedures  Procedures       ED Course                               MDM    Disposition  Final diagnoses:   Bilateral arm pain   Ganglion cyst of wrist - left     Time reflects when diagnosis was documented in both MDM as applicable and the Disposition within this note     Time User Action Codes Description Comment    10/5/2019 11:36 AM Derwood Schilder Add [O24 771,  M33 602] Bilateral arm pain     10/5/2019 11:44 AM Derwood Schilder Add [D22 059] Ganglion cyst of wrist     10/5/2019 11:44 AM Derwood Schilder Modify [M16 274] Ganglion cyst of wrist left      ED Disposition     ED Disposition Condition Date/Time Comment    Discharge Stable Sat Oct 5, 2019 11:34 AM Tutu Hernandez discharge to home/self care  Follow-up Information    None         There are no discharge medications for this patient  No discharge procedures on file      ED Provider  Electronically Signed by           Leanna Arias PA-C  10/21/19 6008

## 2019-11-13 ENCOUNTER — HOSPITAL ENCOUNTER (EMERGENCY)
Facility: HOSPITAL | Age: 29
Discharge: HOME | End: 2019-11-13
Attending: EMERGENCY MEDICINE
Payer: COMMERCIAL

## 2019-11-13 VITALS
DIASTOLIC BLOOD PRESSURE: 60 MMHG | BODY MASS INDEX: 32.45 KG/M2 | RESPIRATION RATE: 16 BRPM | HEART RATE: 72 BPM | OXYGEN SATURATION: 98 % | TEMPERATURE: 97.8 F | SYSTOLIC BLOOD PRESSURE: 124 MMHG | WEIGHT: 171.74 LBS

## 2019-11-13 DIAGNOSIS — N76.0 BV (BACTERIAL VAGINOSIS): Primary | ICD-10-CM

## 2019-11-13 DIAGNOSIS — B96.89 BV (BACTERIAL VAGINOSIS): Primary | ICD-10-CM

## 2019-11-13 LAB
BACTERIA UR QL AUTO: ABNORMAL /HPF
BILIRUB UR QL STRIP: NEGATIVE
CLARITY UR: CLEAR
COLOR UR: YELLOW
EXT PREG TEST URINE: NEGATIVE
EXT. CONTROL ED NAV: NORMAL
GLUCOSE UR STRIP-MCNC: NEGATIVE MG/DL
HGB UR QL STRIP.AUTO: ABNORMAL
KETONES UR STRIP-MCNC: NEGATIVE MG/DL
LEUKOCYTE ESTERASE UR QL STRIP: NEGATIVE
NITRITE UR QL STRIP: NEGATIVE
NON-SQ EPI CELLS URNS QL MICRO: ABNORMAL /HPF
PH UR STRIP.AUTO: 7.5 [PH] (ref 4.5–8)
PROT UR STRIP-MCNC: NEGATIVE MG/DL
RBC #/AREA URNS AUTO: ABNORMAL /HPF
SP GR UR STRIP.AUTO: 1.02 (ref 1–1.03)
UROBILINOGEN UR QL STRIP.AUTO: 0.2 E.U./DL
WBC #/AREA URNS AUTO: ABNORMAL /HPF

## 2019-11-13 PROCEDURE — 81001 URINALYSIS AUTO W/SCOPE: CPT

## 2019-11-13 PROCEDURE — 81025 URINE PREGNANCY TEST: CPT | Performed by: EMERGENCY MEDICINE

## 2019-11-13 PROCEDURE — 99283 EMERGENCY DEPT VISIT LOW MDM: CPT

## 2019-11-13 PROCEDURE — 99283 EMERGENCY DEPT VISIT LOW MDM: CPT | Performed by: PHYSICIAN ASSISTANT

## 2019-11-13 RX ORDER — METRONIDAZOLE 500 MG/1
500 TABLET ORAL EVERY 8 HOURS SCHEDULED
Qty: 21 TABLET | Refills: 0 | Status: SHIPPED | OUTPATIENT
Start: 2019-11-13 | End: 2019-11-20

## 2019-11-13 NOTE — ED PROVIDER NOTES
History  Chief Complaint   Patient presents with    Vaginal Discharge     Vaginal discharge x2 days  Denies abdominal pain, n/v     34year old female presents today complaining of malodorous thin white discharge for the past 2 days  She denies pain or itching  Reports a history of similar symptoms in the past and was diagnosed with BV  Her symptoms at that time improved with metronidazole  Has also had a yeast infection before and says this feels different because she does not have any itching  She does have an OBGYN and has not been seen for this problem previously  She denies fevers, chills, abdominal pain or urinary symptoms  No vaginal bleeding or pelvic pain  None       History reviewed  No pertinent past medical history  Past Surgical History:   Procedure Laterality Date     SECTION  2017       Family History   Problem Relation Age of Onset    No Known Problems Mother     No Known Problems Father     No Known Problems Sister     No Known Problems Brother     Diabetes Maternal Grandmother     No Known Problems Paternal Grandmother     No Known Problems Sister     No Known Problems Brother     No Known Problems Brother     No Known Problems Brother      I have reviewed and agree with the history as documented  Social History     Tobacco Use    Smoking status: Former Smoker     Packs/day: 0 20     Types: Cigarettes     Last attempt to quit: 2018     Years since quittin 8    Smokeless tobacco: Never Used   Substance Use Topics    Alcohol use: Not Currently     Frequency: Never    Drug use: Never        Review of Systems   Genitourinary: Positive for vaginal discharge  All other systems reviewed and are negative  Physical Exam  Physical Exam   Constitutional: She appears well-developed and well-nourished  No distress  HENT:   Head: Normocephalic and atraumatic  Eyes: Conjunctivae are normal    Cardiovascular: Normal rate     Pulmonary/Chest: No respiratory distress  Abdominal: Soft  She exhibits no distension  There is no tenderness  There is no guarding  Genitourinary:   Genitourinary Comments: Refused by patient   Neurological: She is alert  Skin: Skin is warm and dry  Capillary refill takes less than 2 seconds  No rash noted  She is not diaphoretic  Psychiatric: She has a normal mood and affect   Her behavior is normal        Vital Signs  ED Triage Vitals [11/13/19 1628]   Temperature Pulse Respirations Blood Pressure SpO2   97 8 °F (36 6 °C) 72 16 124/60 98 %      Temp Source Heart Rate Source Patient Position - Orthostatic VS BP Location FiO2 (%)   Temporal Monitor Sitting Right arm --      Pain Score       --           Vitals:    11/13/19 1628   BP: 124/60   Pulse: 72   Patient Position - Orthostatic VS: Sitting         Visual Acuity      ED Medications  Medications - No data to display    Diagnostic Studies  Results Reviewed     Procedure Component Value Units Date/Time    Urine Microscopic [[id number]]  (Abnormal) Collected:  11/13/19 1648    Lab Status:  Final result Specimen:  Urine, Clean Catch Updated:  11/13/19 1735     RBC, UA 2-4 /hpf      WBC, UA None Seen /hpf      Epithelial Cells Occasional /hpf      Bacteria, UA Occasional /hpf     POCT pregnancy, urine [[id number]]  (Normal) Resulted:  11/13/19 1652    Lab Status:  Final result Updated:  11/13/19 1652     EXT PREG TEST UR (Ref: Negative) Negative     Control Valid    POCT urinalysis dipstick [[id number]]  (Abnormal) Resulted:  11/13/19 1652    Lab Status:  Final result Updated:  11/13/19 1652    Urine Macroscopic, POC [[id number]]  (Abnormal) Collected:  11/13/19 1648    Lab Status:  Final result Specimen:  Urine Updated:  11/13/19 1649     Color, UA Yellow     Clarity, UA Clear     pH, UA 7 5     Leukocytes, UA Negative     Nitrite, UA Negative     Protein, UA Negative mg/dl      Glucose, UA Negative mg/dl      Ketones, UA Negative mg/dl      Urobilinogen, UA 0 2 E U /dl Bilirubin, UA Negative     Blood, UA Trace     Specific [place], UA 1 020    Narrative:       CLINITEK RESULT                 No orders to display              Procedures  Procedures       ED Course                               MDM    Disposition  Final diagnoses:   BV (bacterial vaginosis)     Time reflects when diagnosis was documented in both MDM as applicable and the Disposition within this note     Time User Action Codes Description Comment    11/13/2019  5:22 PM Shaheen Palomares Add [N76 0,  B96 89] BV (bacterial vaginosis)       ED Disposition     ED Disposition Condition Date/Time Comment    Discharge Stable Wed Nov 13, 2019  5:22 PM Epi Montiel discharge to home/self care  Follow-up Information    None         Discharge Medication List as of 11/13/2019  5:23 PM      START taking these medications    Details   metroNIDAZOLE (FLAGYL) 500 mg tablet Take 1 tablet (500 mg total) by mouth every 8 (eight) hours for 7 days, Starting Wed 11/13/2019, Until Wed 11/20/2019, Print         CONTINUE these medications which have NOT CHANGED    Details   predniSONE 10 mg tablet Take 4 pills for 4 days, 3 pills for 3 days, 2 pills for 2 days and 1 pill on last day, Normal      traMADol (ULTRAM) 50 mg tablet Take 1 tablet (50 mg total) by mouth every 8 (eight) hours as needed for moderate pain, Starting Wed 10/9/2019, Normal           No discharge procedures on file      ED Provider  Electronically Signed by           Elliott Mercer PA-C  11/21/19 0328

## 2019-11-18 ENCOUNTER — HOSPITAL ENCOUNTER (EMERGENCY)
Facility: HOSPITAL | Age: 29
Discharge: HOME | End: 2019-11-18
Attending: EMERGENCY MEDICINE | Admitting: EMERGENCY MEDICINE
Payer: COMMERCIAL

## 2019-11-18 VITALS
HEIGHT: 61 IN | TEMPERATURE: 97.3 F | OXYGEN SATURATION: 99 % | RESPIRATION RATE: 20 BRPM | WEIGHT: 166.45 LBS | SYSTOLIC BLOOD PRESSURE: 124 MMHG | DIASTOLIC BLOOD PRESSURE: 66 MMHG | HEART RATE: 74 BPM | BODY MASS INDEX: 31.43 KG/M2

## 2019-11-18 DIAGNOSIS — M62.838 MUSCLE SPASM: ICD-10-CM

## 2019-11-18 DIAGNOSIS — M54.9 MUSCULOSKELETAL BACK PAIN: Primary | ICD-10-CM

## 2019-11-18 LAB
EXT PREG TEST URINE: NEGATIVE
EXT. CONTROL ED NAV: NORMAL

## 2019-11-18 PROCEDURE — 96372 THER/PROPH/DIAG INJ SC/IM: CPT

## 2019-11-18 PROCEDURE — 99284 EMERGENCY DEPT VISIT MOD MDM: CPT | Performed by: EMERGENCY MEDICINE

## 2019-11-18 PROCEDURE — 99283 EMERGENCY DEPT VISIT LOW MDM: CPT

## 2019-11-18 PROCEDURE — 81025 URINE PREGNANCY TEST: CPT | Performed by: EMERGENCY MEDICINE

## 2019-11-18 RX ORDER — CYCLOBENZAPRINE HCL 10 MG
10 TABLET ORAL 2 TIMES DAILY PRN
Qty: 20 TABLET | Refills: 0 | Status: SHIPPED | OUTPATIENT
Start: 2019-11-18 | End: 2019-12-04

## 2019-11-18 RX ORDER — CYCLOBENZAPRINE HCL 10 MG
10 TABLET ORAL ONCE
Status: COMPLETED | OUTPATIENT
Start: 2019-11-18 | End: 2019-11-18

## 2019-11-18 RX ORDER — NAPROXEN 500 MG/1
500 TABLET ORAL 2 TIMES DAILY WITH MEALS
Qty: 30 TABLET | Refills: 0 | Status: SHIPPED | OUTPATIENT
Start: 2019-11-18 | End: 2019-12-04

## 2019-11-18 RX ORDER — KETOROLAC TROMETHAMINE 30 MG/ML
30 INJECTION, SOLUTION INTRAMUSCULAR; INTRAVENOUS ONCE
Status: COMPLETED | OUTPATIENT
Start: 2019-11-18 | End: 2019-11-18

## 2019-11-18 RX ADMIN — CYCLOBENZAPRINE HYDROCHLORIDE 10 MG: 10 TABLET, FILM COATED ORAL at 07:21

## 2019-11-18 RX ADMIN — KETOROLAC TROMETHAMINE 30 MG: 30 INJECTION, SOLUTION INTRAMUSCULAR; INTRAVENOUS at 07:21

## 2019-11-18 NOTE — ED PROVIDER NOTES
History  Chief Complaint   Patient presents with    Back Pain     Left sided neck pain and lower back pain  53-year-old female presents with complaint of back and shoulder pain  She reports that she works a job require a lot of lifting and she has been using very poor body mechanics  Pain is worse palpation and movement the affected area  She does complain of a tightness spasm into the shoulders and lower back  She has no focal neurological deficits and denies any other acute complaints or concerns  Back Pain   Location:  Lumbar spine  Quality:  Aching and stiffness  Pain severity:  Moderate  Onset quality:  Gradual  Timing:  Constant  Progression:  Waxing and waning  Chronicity:  Recurrent  Relieved by:  Nothing  Worsened by:  Palpation and twisting  Ineffective treatments:  None tried  Associated symptoms: no abdominal pain, no bladder incontinence, no bowel incontinence, no chest pain, no dysuria, no fever, no headaches, no leg pain, no numbness, no paresthesias, no perianal numbness, no tingling and no weakness        Prior to Admission Medications   Prescriptions Last Dose Informant Patient Reported? Taking?   metroNIDAZOLE (FLAGYL) 500 mg tablet 2019 at Unknown time  No Yes   Sig: Take 1 tablet (500 mg total) by mouth every 8 (eight) hours for 7 days      Facility-Administered Medications: None       History reviewed  No pertinent past medical history  Past Surgical History:   Procedure Laterality Date     SECTION  2017       Family History   Problem Relation Age of Onset    No Known Problems Mother     No Known Problems Father     No Known Problems Sister     No Known Problems Brother     Diabetes Maternal Grandmother     No Known Problems Paternal Grandmother     No Known Problems Sister     No Known Problems Brother     No Known Problems Brother     No Known Problems Brother      I have reviewed and agree with the history as documented      Social History Tobacco Use    Smoking status: Former Smoker     Packs/day: 0 20     Types: Cigarettes     Last attempt to quit: 2018     Years since quittin 8    Smokeless tobacco: Never Used   Substance Use Topics    Alcohol use: Not Currently     Frequency: Never    Drug use: Never        Review of Systems   Constitutional: Negative for appetite change, chills, fatigue and fever  HENT: Negative for postnasal drip, sinus pain and trouble swallowing  Eyes: Negative for redness and itching  Respiratory: Negative for chest tightness, shortness of breath and wheezing  Cardiovascular: Negative for chest pain and leg swelling  Gastrointestinal: Negative for abdominal pain, bowel incontinence, constipation, diarrhea, nausea and vomiting  Endocrine: Negative  Genitourinary: Negative for bladder incontinence, difficulty urinating and dysuria  Musculoskeletal: Positive for back pain  Negative for gait problem  Skin: Negative for rash  Allergic/Immunologic: Negative  Neurological: Negative for dizziness, tingling, weakness, numbness, headaches and paresthesias  Hematological: Negative  Psychiatric/Behavioral: Negative  Physical Exam  Physical Exam   Constitutional: She is oriented to person, place, and time  She appears well-developed and well-nourished  HENT:   Head: Normocephalic and atraumatic  Nose: Nose normal    Mouth/Throat: Oropharynx is clear and moist    Eyes: Pupils are equal, round, and reactive to light  Conjunctivae and EOM are normal    Neck: Normal range of motion  Neck supple  Cardiovascular: Normal rate, regular rhythm and normal heart sounds  Pulmonary/Chest: Effort normal and breath sounds normal  No respiratory distress  She has no wheezes  Abdominal: Soft  Bowel sounds are normal  There is no tenderness  There is no guarding  Musculoskeletal: She exhibits no edema or deformity  Cervical back: She exhibits tenderness and spasm          Lumbar back: She exhibits tenderness and spasm  She exhibits no bony tenderness  Back:    Neurological: She is alert and oriented to person, place, and time  Skin: Skin is warm and dry  Capillary refill takes less than 2 seconds  No rash noted  Psychiatric: She has a normal mood and affect  Her behavior is normal    Nursing note and vitals reviewed  Vital Signs  ED Triage Vitals [11/18/19 0633]   Temperature Pulse Respirations Blood Pressure SpO2   (!) 97 3 °F (36 3 °C) 74 20 124/66 99 %      Temp Source Heart Rate Source Patient Position - Orthostatic VS BP Location FiO2 (%)   Tympanic Monitor Sitting Left arm --      Pain Score       7           Vitals:    11/18/19 0633   BP: 124/66   Pulse: 74   Patient Position - Orthostatic VS: Sitting         Visual Acuity      ED Medications  Medications   ketorolac (TORADOL) injection 30 mg (has no administration in time range)   cyclobenzaprine (FLEXERIL) tablet 10 mg (10 mg Oral Given 11/18/19 0721)       Diagnostic Studies  Results Reviewed     Procedure Component Value Units Date/Time    POCT pregnancy, urine [[id number]]  (Normal) Resulted:  11/18/19 0719    Lab Status:  Final result Updated:  11/18/19 0720     EXT PREG TEST UR (Ref: Negative) negative     Control valid                 No orders to display              Procedures  Procedures       ED Course                               MDM  Number of Diagnoses or Management Options  Muscle spasm:   Musculoskeletal back pain:   Diagnosis management comments: 28-year-old female presents with complaint of musculoskeletal pain  She has no red flag symptoms and no signs of acute infection  Discussed supportive care measures, expected clinical course, and need to use proper body mechanics and back support        Disposition  Final diagnoses:   Musculoskeletal back pain   Muscle spasm     Time reflects when diagnosis was documented in both MDM as applicable and the Disposition within this note     Time User Action Codes Description Comment    11/18/2019  7:07 AM Dimitrios Mcdonald Add [M54 9] Musculoskeletal back pain     11/18/2019  7:08 AM Dimitrios Mcdonald Add [K35 621] Muscle spasm       ED Disposition     ED Disposition Condition Date/Time Comment    Discharge Stable Mon Nov 18, 2019  7:07 AM Scooby Brink discharge to home/self care  Follow-up Information    None         Patient's Medications   Discharge Prescriptions    CYCLOBENZAPRINE (FLEXERIL) 10 MG TABLET    Take 1 tablet (10 mg total) by mouth 2 (two) times a day as needed for muscle spasms       Start Date: 11/18/2019End Date: --       Order Dose: 10 mg       Quantity: 20 tablet    Refills: 0    NAPROXEN (NAPROSYN) 500 MG TABLET    Take 1 tablet (500 mg total) by mouth 2 (two) times a day with meals       Start Date: 11/18/2019End Date: --       Order Dose: 500 mg       Quantity: 30 tablet    Refills: 0     No discharge procedures on file      ED Provider  Electronically Signed by           Don Balbuena DO  11/18/19 8613

## 2019-12-04 ENCOUNTER — HOSPITAL ENCOUNTER (EMERGENCY)
Facility: HOSPITAL | Age: 29
Discharge: HOME | End: 2019-12-04
Attending: EMERGENCY MEDICINE
Payer: COMMERCIAL

## 2019-12-04 VITALS
RESPIRATION RATE: 18 BRPM | SYSTOLIC BLOOD PRESSURE: 114 MMHG | BODY MASS INDEX: 31.82 KG/M2 | DIASTOLIC BLOOD PRESSURE: 65 MMHG | TEMPERATURE: 97.6 F | OXYGEN SATURATION: 99 % | HEART RATE: 83 BPM | WEIGHT: 168.43 LBS

## 2019-12-04 DIAGNOSIS — N89.8 VAGINAL DISCHARGE: Primary | ICD-10-CM

## 2019-12-04 DIAGNOSIS — B96.89 BACTERIAL VAGINOSIS: ICD-10-CM

## 2019-12-04 DIAGNOSIS — N76.0 BACTERIAL VAGINOSIS: ICD-10-CM

## 2019-12-04 LAB
BACTERIA UR QL AUTO: ABNORMAL /HPF
BILIRUB UR QL STRIP: NEGATIVE
CLARITY UR: CLEAR
CLARITY, POC: CLEAR
COLOR UR: YELLOW
COLOR, POC: YELLOW
EXT PREG TEST URINE: NEGATIVE
EXT. CONTROL ED NAV: NORMAL
GLUCOSE UR STRIP-MCNC: NEGATIVE MG/DL
HGB UR QL STRIP.AUTO: ABNORMAL
KETONES UR STRIP-MCNC: NEGATIVE MG/DL
LEUKOCYTE ESTERASE UR QL STRIP: NEGATIVE
NITRITE UR QL STRIP: NEGATIVE
NON-SQ EPI CELLS URNS QL MICRO: ABNORMAL /HPF
PH UR STRIP.AUTO: 6 [PH] (ref 4.5–8)
PROT UR STRIP-MCNC: NEGATIVE MG/DL
RBC #/AREA URNS AUTO: ABNORMAL /HPF
SP GR UR STRIP.AUTO: >=1.03 (ref 1–1.03)
UROBILINOGEN UR QL STRIP.AUTO: 0.2 E.U./DL
WBC #/AREA URNS AUTO: ABNORMAL /HPF

## 2019-12-04 PROCEDURE — 81001 URINALYSIS AUTO W/SCOPE: CPT

## 2019-12-04 PROCEDURE — 99284 EMERGENCY DEPT VISIT MOD MDM: CPT

## 2019-12-04 PROCEDURE — 87591 N.GONORRHOEAE DNA AMP PROB: CPT | Performed by: EMERGENCY MEDICINE

## 2019-12-04 PROCEDURE — 99283 EMERGENCY DEPT VISIT LOW MDM: CPT | Performed by: EMERGENCY MEDICINE

## 2019-12-04 PROCEDURE — 87491 CHLMYD TRACH DNA AMP PROBE: CPT | Performed by: EMERGENCY MEDICINE

## 2019-12-04 PROCEDURE — 81025 URINE PREGNANCY TEST: CPT | Performed by: EMERGENCY MEDICINE

## 2019-12-04 RX ORDER — FLUCONAZOLE 100 MG/1
200 TABLET ORAL ONCE
Status: COMPLETED | OUTPATIENT
Start: 2019-12-04 | End: 2019-12-04

## 2019-12-04 RX ORDER — METRONIDAZOLE 500 MG/1
500 TABLET ORAL EVERY 12 HOURS SCHEDULED
Qty: 14 TABLET | Refills: 0 | Status: SHIPPED | OUTPATIENT
Start: 2019-12-04 | End: 2019-12-11

## 2019-12-04 RX ORDER — METRONIDAZOLE 500 MG/1
500 TABLET ORAL EVERY 12 HOURS SCHEDULED
Qty: 14 TABLET | Refills: 0 | Status: SHIPPED | OUTPATIENT
Start: 2019-12-04 | End: 2019-12-04 | Stop reason: SDUPTHER

## 2019-12-04 RX ADMIN — FLUCONAZOLE 200 MG: 100 TABLET ORAL at 17:33

## 2019-12-04 NOTE — ED PROVIDER NOTES
History  Chief Complaint   Patient presents with    Abdominal Pain     Reports R sided suprapubic pain since 1300, white/cream colored vaginal discharge x2 weeks, itching, denies burning with urination, denies hematuria  This is a 79-year-old female who presents with vaginal discharge over the past 2 weeks  The patient states that she has been experiencing a white/creamy vaginal discharge over the past 2 weeks  She also describes vaginal itching  The patient is not currently sexually active  The patient states that she does have a distant history of STDs  She has experienced similar vaginal drainage in the past   She was seen last month for similar symptoms and prescribed Flagyl for possible BV  The Flagyl seemed to help but the discharge returned  She has been using vagisil with mild relief  Starting earlier this afternoon, the patient started to experience a dull right-sided pelvic pain which has been constant in nature  Denies any alleviating or exacerbating factors  She has experienced similar symptoms in the past   Denies fever/chills, nausea/vomiting, lightheadedness/dizziness, numbness/weakness, headache, change in vision, URI symptoms, neck pain, chest pain, palpitations, shortness of breath, cough, back pain, flank pain, abdominal pain, diarrhea, hematochezia, melena, dysuria, hematuria, abnormal vaginal bleeding  None       History reviewed  No pertinent past medical history      Past Surgical History:   Procedure Laterality Date     SECTION  2017       Family History   Problem Relation Age of Onset    No Known Problems Mother     No Known Problems Father     No Known Problems Sister     No Known Problems Brother     Diabetes Maternal Grandmother     No Known Problems Paternal Grandmother     No Known Problems Sister     No Known Problems Brother     No Known Problems Brother     No Known Problems Brother      I have reviewed and agree with the history as documented  Social History     Tobacco Use    Smoking status: Former Smoker     Packs/day: 0 20     Types: Cigarettes     Last attempt to quit: 2018     Years since quittin 9    Smokeless tobacco: Never Used   Substance Use Topics    Alcohol use: Not Currently     Frequency: Never    Drug use: Never        Review of Systems   Constitutional: Negative for chills and fever  HENT: Negative for rhinorrhea, sore throat and trouble swallowing  Eyes: Negative for photophobia and visual disturbance  Respiratory: Negative for cough, chest tightness and shortness of breath  Cardiovascular: Negative for chest pain, palpitations and leg swelling  Gastrointestinal: Negative for abdominal pain, blood in stool, diarrhea, nausea and vomiting  Endocrine: Negative for polyuria  Genitourinary: Positive for vaginal discharge  Negative for dysuria, flank pain, hematuria and vaginal bleeding  Musculoskeletal: Negative for back pain and neck pain  Skin: Negative for color change and rash  Allergic/Immunologic: Negative for immunocompromised state  Neurological: Negative for dizziness, weakness, light-headedness, numbness and headaches  All other systems reviewed and are negative  Physical Exam  Physical Exam   Constitutional: Vital signs are normal  She appears well-developed  She is cooperative  No distress  HENT:   Mouth/Throat: Uvula is midline, oropharynx is clear and moist and mucous membranes are normal    Eyes: Pupils are equal, round, and reactive to light  Conjunctivae and EOM are normal    Neck: Trachea normal  No thyroid mass and no thyromegaly present  Cardiovascular: Normal rate, regular rhythm, normal heart sounds, intact distal pulses and normal pulses  No murmur heard  Pulmonary/Chest: Effort normal and breath sounds normal    Abdominal: Soft  Normal appearance and bowel sounds are normal  There is no tenderness  There is no rebound, no guarding and no CVA tenderness  Neurological: She is alert  Skin: Skin is warm, dry and intact  Psychiatric: She has a normal mood and affect  Her speech is normal and behavior is normal  Thought content normal        Vital Signs  ED Triage Vitals [12/04/19 1640]   Temperature Pulse Respirations Blood Pressure SpO2   97 6 °F (36 4 °C) 83 18 114/65 99 %      Temp Source Heart Rate Source Patient Position - Orthostatic VS BP Location FiO2 (%)   Oral Monitor Sitting Right arm --      Pain Score       7           Vitals:    12/04/19 1640   BP: 114/65   Pulse: 83   Patient Position - Orthostatic VS: Sitting         Visual Acuity      ED Medications  Medications   fluconazole (DIFLUCAN) tablet 200 mg (200 mg Oral Given 12/4/19 1733)       Diagnostic Studies  Results Reviewed     Procedure Component Value Units Date/Time    Chlamydia/GC amplified DNA by PCR [[id number]] Collected:  12/04/19 1732    Lab Status: In process Specimen:  Urine, Other Updated:  12/04/19 1751    Urine Microscopic [[id number]] Collected:  12/04/19 1730    Lab Status:   In process Specimen:  Urine, Clean Catch Updated:  12/04/19 1751    POCT urinalysis dipstick [[id number]]  (Abnormal) Resulted:  12/04/19 1732    Lab Status:  Final result Specimen:  Urine Updated:  12/04/19 1732     Color, UA yellow     Clarity, UA clear    POCT pregnancy, urine [[id number]]  (Normal) Resulted:  12/04/19 1732    Lab Status:  Final result Updated:  12/04/19 1732     EXT PREG TEST UR (Ref: Negative) negative     Control valid    Urine Macroscopic, POC [[id number]]  (Abnormal) Collected:  12/04/19 1730    Lab Status:  Final result Specimen:  Urine Updated:  12/04/19 1731     Color, UA Yellow     Clarity, UA Clear     pH, UA 6 0     Leukocytes, UA Negative     Nitrite, UA Negative     Protein, UA Negative mg/dl      Glucose, UA Negative mg/dl      Ketones, UA Negative mg/dl      Urobilinogen, UA 0 2 E U /dl      Bilirubin, UA Negative     Blood, UA Trace     Specific Gravity, UA >=1 030    Narrative: CLINITEK RESULT                 No orders to display              Procedures  Procedures         ED Course                               MDM  Number of Diagnoses or Management Options  Diagnosis management comments: Likely BV verses vulvovaginal candidiasis  Will give a dose of Diflucan  Ultimately, treat with Flagyl  Follow up with her OBGYN  Strict return precautions given  Disposition  Final diagnoses:   Vaginal discharge   Bacterial vaginosis     Time reflects when diagnosis was documented in both MDM as applicable and the Disposition within this note     Time User Action Codes Description Comment    12/4/2019  6:07 PM Julianne Soulier Add [N89 8] Vaginal discharge     12/4/2019  6:07 PM Julianne Soulier Add [N76 0,  B96 89] Bacterial vaginosis       ED Disposition     ED Disposition Condition Date/Time Comment    Discharge Stable Wed Dec 4, 2019  6:07 PM Leathamyles Jamaal discharge to home/self care  Follow-up Information     Follow up With Specialties Details Why [address] [place] [address] Emergency Department Emergency Medicine Go to  If symptoms worsen [facility] [id number]  [phone] [place], [address] [facility] , \[facility] [place] [facility]", [address] [place] [address], [facility] Schedule an appointment as soon as possible for a visit   [address], [address] [place] [id number]  [facility] [address], [address] [place] [address], [id number] [phone]          Discharge Medication List as of 12/4/2019  6:08 PM      START taking these medications    Details   metroNIDAZOLE (FLAGYL) 500 mg tablet Take 1 tablet (500 mg total) by mouth every 12 (twelve) hours for 7 days, Starting Wed 12/4/2019, Until Wed 12/11/2019, Normal           No discharge procedures on file      ED Provider  Electronically Signed by Kike Beltrán MD  12/04/19 7595

## 2019-12-05 LAB
C TRACH DNA SPEC QL NAA+PROBE: NEGATIVE
N GONORRHOEA DNA SPEC QL NAA+PROBE: NEGATIVE

## 2020-01-09 ENCOUNTER — PROCEDURE VISIT (OUTPATIENT)
Dept: OBGYN CLINIC | Facility: CLINIC | Age: 30
End: 2020-01-09

## 2020-01-09 VITALS
SYSTOLIC BLOOD PRESSURE: 132 MMHG | BODY MASS INDEX: 30.96 KG/M2 | HEART RATE: 78 BPM | DIASTOLIC BLOOD PRESSURE: 66 MMHG | HEIGHT: 61 IN | WEIGHT: 164 LBS

## 2020-01-09 DIAGNOSIS — Z30.46 ENCOUNTER FOR NEXPLANON REMOVAL: Primary | ICD-10-CM

## 2020-01-09 PROCEDURE — 11982 REMOVE DRUG IMPLANT DEVICE: CPT | Performed by: OBSTETRICS & GYNECOLOGY

## 2020-01-09 PROCEDURE — 99213 OFFICE O/P EST LOW 20 MIN: CPT | Performed by: OBSTETRICS & GYNECOLOGY

## 2020-01-09 NOTE — PROGRESS NOTES
Remove and insert drug implant  Date/Time: 1/9/2020 6:00 PM  Performed by: Megan Lopez MD  Authorized by: Sherly Self MD     Consent:     Consent obtained:  Verbal and written    Consent given by:  Patient    Procedural risks discussed:  Bleeding, repeat procedure, failure rate and infection    Patient questions answered: yes      Patient agrees, verbalizes understanding, and wants to proceed: yes    Indication:     Indication: Presence of non-biodegradable drug delivery implant    Pre-procedure:     Prepped with: povidone-iodine      Local anesthetic:  Lidocaine without epinephrine  Procedure:     Procedure:  Removal    Left/right:  Left    Site was closed with steri-strips and pressure bandage applied: yes    Comments:      30yo [id number] presenting for Nexplanon removal  Nexplanon placed on 10/2018  She would like it removed because they are thinking about having more children  Reviewed with patient that she will be able to get pregnant right after removal and that she should start taking PNV today  Also reviewed that if is unsure if she wants to have a baby now, that she should consider other methods of birth control  She expressed understanding  Risks of procedure reviewed with patient  All questions were answered and she agreed to proceed with Nexplanon removal   The implant was palpated on her left inner/upper arm  The area was cleaned with an alcohol swab and lidocaine was injected for analgesia  The area was again prepped with betadine swabs  A scalpel was used to create a small incision and a hemostat was used to locate the device  The implant was easily removed and was noted to be intact  Steri strips and a pressure dressing were applied  Patient was advised to keep dressing in place for 24hrs  Patient tolerated procedure well  Dr Kelsy Martin was present for all key portions of the procedure        Megan Lopez MD  OB/GYN PGY-2  1/9/2020  6:07 PM

## 2020-01-30 ENCOUNTER — HOSPITAL ENCOUNTER (EMERGENCY)
Facility: HOSPITAL | Age: 30
Discharge: HOME | End: 2020-01-30
Attending: EMERGENCY MEDICINE | Admitting: EMERGENCY MEDICINE
Payer: COMMERCIAL

## 2020-01-30 VITALS
SYSTOLIC BLOOD PRESSURE: 134 MMHG | RESPIRATION RATE: 16 BRPM | BODY MASS INDEX: 31.08 KG/M2 | HEART RATE: 82 BPM | OXYGEN SATURATION: 98 % | WEIGHT: 164.46 LBS | DIASTOLIC BLOOD PRESSURE: 62 MMHG | TEMPERATURE: 98.5 F

## 2020-01-30 DIAGNOSIS — R11.0 NAUSEA: ICD-10-CM

## 2020-01-30 DIAGNOSIS — N39.0 UTI (URINARY TRACT INFECTION): ICD-10-CM

## 2020-01-30 DIAGNOSIS — R10.9 ACUTE ABDOMINAL PAIN: Primary | ICD-10-CM

## 2020-01-30 LAB
BACTERIA UR QL AUTO: ABNORMAL /HPF
BILIRUB UR QL STRIP: NEGATIVE
CLARITY UR: CLEAR
CLARITY, POC: CLEAR
COLOR UR: YELLOW
EXT PREG TEST URINE: NEGATIVE
EXT. CONTROL ED NAV: NORMAL
GLUCOSE UR STRIP-MCNC: NEGATIVE MG/DL
HGB UR QL STRIP.AUTO: ABNORMAL
KETONES UR STRIP-MCNC: ABNORMAL MG/DL
LEUKOCYTE ESTERASE UR QL STRIP: ABNORMAL
NITRITE UR QL STRIP: NEGATIVE
NON-SQ EPI CELLS URNS QL MICRO: ABNORMAL /HPF
PH UR STRIP.AUTO: 6 [PH] (ref 4.5–8)
PROT UR STRIP-MCNC: NEGATIVE MG/DL
RBC #/AREA URNS AUTO: ABNORMAL /HPF
SP GR UR STRIP.AUTO: 1.02 (ref 1–1.03)
UROBILINOGEN UR QL STRIP.AUTO: 0.2 E.U./DL
WBC #/AREA URNS AUTO: ABNORMAL /HPF

## 2020-01-30 PROCEDURE — 87591 N.GONORRHOEAE DNA AMP PROB: CPT | Performed by: EMERGENCY MEDICINE

## 2020-01-30 PROCEDURE — 96372 THER/PROPH/DIAG INJ SC/IM: CPT

## 2020-01-30 PROCEDURE — 81001 URINALYSIS AUTO W/SCOPE: CPT

## 2020-01-30 PROCEDURE — 87491 CHLMYD TRACH DNA AMP PROBE: CPT | Performed by: EMERGENCY MEDICINE

## 2020-01-30 PROCEDURE — 99284 EMERGENCY DEPT VISIT MOD MDM: CPT | Performed by: EMERGENCY MEDICINE

## 2020-01-30 PROCEDURE — 99284 EMERGENCY DEPT VISIT MOD MDM: CPT

## 2020-01-30 PROCEDURE — 81025 URINE PREGNANCY TEST: CPT | Performed by: EMERGENCY MEDICINE

## 2020-01-30 RX ORDER — AZITHROMYCIN 250 MG/1
1000 TABLET, FILM COATED ORAL ONCE
Status: COMPLETED | OUTPATIENT
Start: 2020-01-30 | End: 2020-01-30

## 2020-01-30 RX ORDER — PHENAZOPYRIDINE HYDROCHLORIDE 200 MG/1
200 TABLET, FILM COATED ORAL 3 TIMES DAILY
Qty: 6 TABLET | Refills: 0 | Status: SHIPPED | OUTPATIENT
Start: 2020-01-30 | End: 2020-02-01

## 2020-01-30 RX ORDER — NITROFURANTOIN 25; 75 MG/1; MG/1
100 CAPSULE ORAL 2 TIMES DAILY
Qty: 10 CAPSULE | Refills: 0 | Status: SHIPPED | OUTPATIENT
Start: 2020-01-30 | End: 2020-02-04

## 2020-01-30 RX ORDER — IBUPROFEN 600 MG/1
600 TABLET ORAL ONCE
Status: COMPLETED | OUTPATIENT
Start: 2020-01-30 | End: 2020-01-30

## 2020-01-30 RX ADMIN — CEFTRIAXONE SODIUM 250 MG: 250 INJECTION, POWDER, FOR SOLUTION INTRAMUSCULAR; INTRAVENOUS at 15:23

## 2020-01-30 RX ADMIN — AZITHROMYCIN 1000 MG: 250 TABLET, FILM COATED ORAL at 15:23

## 2020-01-30 RX ADMIN — IBUPROFEN 600 MG: 600 TABLET ORAL at 15:11

## 2020-01-30 NOTE — ED PROVIDER NOTES
History  Chief Complaint   Patient presents with    Abdominal Pain     suprapubic pain with nausea; ongoing since yesterday; denies v/d/c and any urinary or vaginal complaints; Possible postive home HCG test a few days ago "one of the 2 lines was iraida faded"    Nausea       History provided by:  Patient and significant other   used: Yes    Abdominal Pain   Pain location:  Suprapubic  Pain quality: pressure    Pain radiates to:  Does not radiate  Pain severity:  Moderate  Onset quality:  Gradual  Duration:  2 days  Timing:  Constant  Progression:  Unchanged  Chronicity:  New  Context comment:  Pt had her implenon removed earlier this month   questionable home pregnancy test  Relieved by:  Nothing  Worsened by:  Nothing  Ineffective treatments:  None tried  Associated symptoms: no anorexia, no chest pain, no chills, no constipation, no cough, no diarrhea, no dysuria, no fatigue, no fever, no hematemesis, no hematochezia, no hematuria, no melena, no nausea, no shortness of breath, no sore throat, no vaginal bleeding, no vaginal discharge and no vomiting    Nausea   The primary symptoms include abdominal pain  Primary symptoms do not include fever, fatigue, nausea, vomiting, diarrhea, melena, hematemesis, hematochezia, dysuria, myalgias, arthralgias or rash  The illness does not include chills, anorexia or constipation  None       History reviewed  No pertinent past medical history      Past Surgical History:   Procedure Laterality Date     SECTION  2017       Family History   Problem Relation Age of Onset    No Known Problems Mother     No Known Problems Father     No Known Problems Sister     No Known Problems Brother     Diabetes Maternal Grandmother     No Known Problems Paternal Grandmother     No Known Problems Sister     No Known Problems Brother     No Known Problems Brother     No Known Problems Brother      I have reviewed and agree with the history as documented  Social History     Tobacco Use    Smoking status: Former Smoker     Packs/day: 0 20     Types: Cigarettes     Last attempt to quit: 2018     Years since quittin 0    Smokeless tobacco: Never Used   Substance Use Topics    Alcohol use: Not Currently     Frequency: Never    Drug use: Never        Review of Systems   Constitutional: Negative for activity change, appetite change, chills, fatigue and fever  HENT: Negative for congestion, dental problem, ear pain, rhinorrhea and sore throat  Eyes: Negative for pain and redness  Respiratory: Negative for cough, chest tightness, shortness of breath and wheezing  Cardiovascular: Negative for chest pain and palpitations  Gastrointestinal: Positive for abdominal pain  Negative for abdominal distention, anal bleeding, anorexia, blood in stool, constipation, diarrhea, hematemesis, hematochezia, melena, nausea, rectal pain and vomiting  Endocrine: Negative for cold intolerance and heat intolerance  Genitourinary: Negative for dysuria, frequency, hematuria, vaginal bleeding, vaginal discharge and vaginal pain  Musculoskeletal: Negative for arthralgias and myalgias  Skin: Negative for color change, pallor and rash  Neurological: Negative for weakness and numbness  Hematological: Does not bruise/bleed easily  Psychiatric/Behavioral: Negative for agitation, hallucinations and suicidal ideas  Physical Exam  Physical Exam   Constitutional: She is oriented to person, place, and time  She appears well-developed and well-nourished  HENT:   Mouth/Throat: No oropharyngeal exudate  TMs normal bilaterally no pharyngeal erythema no rhinorrhea nontender palpation of sinuses, normal looking turbinates   Eyes: Conjunctivae and EOM are normal    Neck: Normal range of motion  Neck supple  No meningeal signs   Cardiovascular: Normal rate, regular rhythm, normal heart sounds and intact distal pulses     Pulmonary/Chest: Effort normal and breath sounds normal  No respiratory distress  She has no wheezes  She has no rales  She exhibits no tenderness  Abdominal: Soft  Bowel sounds are normal  She exhibits no distension and no mass  There is no tenderness  No hernia  No cvat   Musculoskeletal: Normal range of motion  She exhibits no edema  Lymphadenopathy:     She has no cervical adenopathy  Neurological: She is alert and oriented to person, place, and time  No cranial nerve deficit  Skin: No rash noted  No erythema  No edema   Psychiatric: She has a normal mood and affect  Her behavior is normal    Nursing note and vitals reviewed  Vital Signs  ED Triage Vitals [01/30/20 1225]   Temperature Pulse Respirations Blood Pressure SpO2   98 5 °F (36 9 °C) 82 16 134/62 98 %      Temp Source Heart Rate Source Patient Position - Orthostatic VS BP Location FiO2 (%)   Oral Monitor -- -- --      Pain Score       4           Vitals:    01/30/20 1225   BP: 134/62   Pulse: 82         Visual Acuity      ED Medications  Medications   sterile water injection **ADS Override Pull** (has no administration in time range)   ibuprofen (MOTRIN) tablet 600 mg (600 mg Oral Given 1/30/20 1511)   azithromycin (ZITHROMAX) tablet 1,000 mg (1,000 mg Oral Given 1/30/20 1523)   cefTRIAXone (ROCEPHIN) 250 mg in sterile water IM only syringe (250 mg Intramuscular Given 1/30/20 1523)       Diagnostic Studies  Results Reviewed     Procedure Component Value Units Date/Time    POCT urinalysis dipstick [[id number]]  (Normal) Resulted:  01/30/20 1459    Lab Status:  Final result Specimen:  Urine Updated:  01/30/20 1459     Clarity, UA clear    Chlamydia/GC amplified DNA by PCR [[id number]] Collected:  01/30/20 1432    Lab Status: In process Specimen:  Urine, Other Updated:  01/30/20 1434    Urine Microscopic [[id number]] Collected:  01/30/20 1428    Lab Status:   In process Specimen:  Urine, Clean Catch Updated:  01/30/20 1434    POCT pregnancy, urine [[id number]]  (Normal) Resulted:  01/30/20 1432    Lab Status:  Final result Updated:  01/30/20 1432     EXT PREG TEST UR (Ref: Negative) negative     Control valid    Urine Macroscopic, POC [[id number]]  (Abnormal) Collected:  01/30/20 1428    Lab Status:  Final result Specimen:  Urine Updated:  01/30/20 1429     Color, UA Yellow     Clarity, UA Clear     pH, UA 6 0     Leukocytes, UA Small     Nitrite, UA Negative     Protein, UA Negative mg/dl      Glucose, UA Negative mg/dl      Ketones, UA Trace mg/dl      Urobilinogen, UA 0 2 E U /dl      Bilirubin, UA Negative     Blood, UA Trace     Specific [place], UA 1 025    Narrative:       CLINITEK RESULT                 No orders to display              Procedures  Procedures         ED Course  ED Course as of Jan 30 1538   Thu Jan 30, 2020   1451 Leukocytes, UA(!): Small   1515 Pt requesting tx for gc/chlamydia                                  MDM  Number of Diagnoses or Management Options  Acute abdominal pain:   Nausea:   UTI (urinary tract infection):   Diagnosis management comments: Lower abdominal pain with a benign exam- will do urine dip, upreg, gc chlamydia        Disposition  Final diagnoses:   Acute abdominal pain   UTI (urinary tract infection)   Nausea     Time reflects when diagnosis was documented in both MDM as applicable and the Disposition within this note     Time User Action Codes Description Comment    1/30/2020  2:55 PM Nofarzana Loera Add [R10 9] Acute abdominal pain     1/30/2020  2:55 PM Cayden Suarez Add [N39 0] UTI (urinary tract infection)     1/30/2020  2:55 PM Noberto Halon Add [R11 0] Nausea       ED Disposition     ED Disposition Condition Date/Time Comment    Discharge Stable u Jan 30, 2020  2:51 PM Krista Blank discharge to home/self care              Follow-up Information     Follow up With Specialties Details Why Contact Info    [employer]  Schedule an appointment as soon as possible for a visit in 2 days  [phone]            Discharge Medication List as of 1/30/2020  2:59 PM      START taking these medications    Details   nitrofurantoin (MACROBID) 100 mg capsule Take 1 capsule (100 mg total) by mouth 2 (two) times a day for 5 days, Starting u 1/30/2020, Until Tue 2/4/2020, Normal      phenazopyridine (PYRIDIUM) 200 mg tablet Take 1 tablet (200 mg total) by mouth 3 (three) times a day for 2 days, Starting Thu 1/30/2020, Until Sat 2/1/2020, Normal           No discharge procedures on file      ED Provider  Electronically Signed by           Kapil Trinidad MD  01/30/20 2065

## 2020-01-31 LAB
C TRACH DNA SPEC QL NAA+PROBE: NEGATIVE
N GONORRHOEA DNA SPEC QL NAA+PROBE: NEGATIVE

## 2020-02-19 ENCOUNTER — HOSPITAL ENCOUNTER (INPATIENT)
Facility: HOSPITAL | Age: 30
LOS: 3 days | Discharge: HOME | DRG: 720 | End: 2020-02-22
Attending: EMERGENCY MEDICINE | Admitting: HOSPITALIST
Payer: COMMERCIAL

## 2020-02-19 ENCOUNTER — APPOINTMENT (EMERGENCY)
Dept: RADIOLOGY | Facility: HOSPITAL | Age: 30
DRG: 720 | End: 2020-02-19
Payer: COMMERCIAL

## 2020-02-19 ENCOUNTER — APPOINTMENT (EMERGENCY)
Dept: CT IMAGING | Facility: HOSPITAL | Age: 30
DRG: 720 | End: 2020-02-19
Payer: COMMERCIAL

## 2020-02-19 DIAGNOSIS — R10.2 VAGINAL PAIN: ICD-10-CM

## 2020-02-19 DIAGNOSIS — N12 PYELONEPHRITIS OF LEFT KIDNEY: ICD-10-CM

## 2020-02-19 DIAGNOSIS — N12 PYELONEPHRITIS: Primary | ICD-10-CM

## 2020-02-19 PROBLEM — E87.6 HYPOKALEMIA: Status: ACTIVE | Noted: 2020-02-19

## 2020-02-19 PROBLEM — A41.9 SEPSIS (HCC): Status: ACTIVE | Noted: 2020-02-19

## 2020-02-19 LAB
ALBUMIN SERPL BCP-MCNC: 3.6 G/DL (ref 3.5–5)
ALP SERPL-CCNC: 77 U/L (ref 46–116)
ALT SERPL W P-5'-P-CCNC: 19 U/L (ref 12–78)
ANION GAP SERPL CALCULATED.3IONS-SCNC: 12 MMOL/L (ref 4–13)
AST SERPL W P-5'-P-CCNC: 16 U/L (ref 5–45)
BACTERIA UR QL AUTO: ABNORMAL /HPF
BASOPHILS # BLD AUTO: 0.01 THOUSANDS/ΜL (ref 0–0.1)
BASOPHILS NFR BLD AUTO: 0 % (ref 0–1)
BILIRUB SERPL-MCNC: 0.49 MG/DL (ref 0.2–1)
BILIRUB UR QL STRIP: NEGATIVE
BUN SERPL-MCNC: 8 MG/DL (ref 5–25)
CALCIUM SERPL-MCNC: 8.8 MG/DL (ref 8.3–10.1)
CHLORIDE SERPL-SCNC: 102 MMOL/L (ref 100–108)
CLARITY UR: CLEAR
CLARITY, POC: CLEAR
CO2 SERPL-SCNC: 22 MMOL/L (ref 21–32)
COLOR UR: YELLOW
COLOR, POC: YELLOW
CREAT SERPL-MCNC: 0.77 MG/DL (ref 0.6–1.3)
EOSINOPHIL # BLD AUTO: 0.01 THOUSAND/ΜL (ref 0–0.61)
EOSINOPHIL NFR BLD AUTO: 0 % (ref 0–6)
ERYTHROCYTE [DISTWIDTH] IN BLOOD BY AUTOMATED COUNT: 12.8 % (ref 11.6–15.1)
EXT PREG TEST URINE: NEGATIVE
EXT. CONTROL ED NAV: NORMAL
FLUAV RNA NPH QL NAA+PROBE: NORMAL
FLUBV RNA NPH QL NAA+PROBE: NORMAL
GFR SERPL CREATININE-BSD FRML MDRD: 106 ML/MIN/1.73SQ M
GLUCOSE SERPL-MCNC: 135 MG/DL (ref 65–140)
GLUCOSE UR STRIP-MCNC: NEGATIVE MG/DL
HCT VFR BLD AUTO: 38.6 % (ref 34.8–46.1)
HGB BLD-MCNC: 12.8 G/DL (ref 11.5–15.4)
HGB UR QL STRIP.AUTO: ABNORMAL
IMM GRANULOCYTES # BLD AUTO: 0.04 THOUSAND/UL (ref 0–0.2)
IMM GRANULOCYTES NFR BLD AUTO: 0 % (ref 0–2)
KETONES UR STRIP-MCNC: ABNORMAL MG/DL
LACTATE SERPL-SCNC: 0.5 MMOL/L (ref 0.5–2)
LEUKOCYTE ESTERASE UR QL STRIP: ABNORMAL
LYMPHOCYTES # BLD AUTO: 0.79 THOUSANDS/ΜL (ref 0.6–4.47)
LYMPHOCYTES NFR BLD AUTO: 7 % (ref 14–44)
MCH RBC QN AUTO: 29.6 PG (ref 26.8–34.3)
MCHC RBC AUTO-ENTMCNC: 33.2 G/DL (ref 31.4–37.4)
MCV RBC AUTO: 89 FL (ref 82–98)
MONOCYTES # BLD AUTO: 0.7 THOUSAND/ΜL (ref 0.17–1.22)
MONOCYTES NFR BLD AUTO: 6 % (ref 4–12)
MUCOUS THREADS UR QL AUTO: ABNORMAL
NEUTROPHILS # BLD AUTO: 9.93 THOUSANDS/ΜL (ref 1.85–7.62)
NEUTS SEG NFR BLD AUTO: 87 % (ref 43–75)
NITRITE UR QL STRIP: NEGATIVE
NON-SQ EPI CELLS URNS QL MICRO: ABNORMAL /HPF
NRBC BLD AUTO-RTO: 0 /100 WBCS
PH UR STRIP.AUTO: 6 [PH] (ref 4.5–8)
PLATELET # BLD AUTO: 153 THOUSANDS/UL (ref 149–390)
PMV BLD AUTO: 11 FL (ref 8.9–12.7)
POTASSIUM SERPL-SCNC: 3 MMOL/L (ref 3.5–5.3)
PROT SERPL-MCNC: 7.5 G/DL (ref 6.4–8.2)
PROT UR STRIP-MCNC: NEGATIVE MG/DL
RBC # BLD AUTO: 4.33 MILLION/UL (ref 3.81–5.12)
RBC #/AREA URNS AUTO: ABNORMAL /HPF
RSV RNA NPH QL NAA+PROBE: NORMAL
SODIUM SERPL-SCNC: 136 MMOL/L (ref 136–145)
SP GR UR STRIP.AUTO: 1.01 (ref 1–1.03)
UROBILINOGEN UR QL STRIP.AUTO: 0.2 E.U./DL
WBC # BLD AUTO: 11.48 THOUSAND/UL (ref 4.31–10.16)
WBC #/AREA URNS AUTO: ABNORMAL /HPF

## 2020-02-19 PROCEDURE — 71046 X-RAY EXAM CHEST 2 VIEWS: CPT

## 2020-02-19 PROCEDURE — 81025 URINE PREGNANCY TEST: CPT | Performed by: PHYSICIAN ASSISTANT

## 2020-02-19 PROCEDURE — 74177 CT ABD & PELVIS W/CONTRAST: CPT

## 2020-02-19 PROCEDURE — 85049 AUTOMATED PLATELET COUNT: CPT | Performed by: PHYSICIAN ASSISTANT

## 2020-02-19 PROCEDURE — 84145 PROCALCITONIN (PCT): CPT | Performed by: PHYSICIAN ASSISTANT

## 2020-02-19 PROCEDURE — 80053 COMPREHEN METABOLIC PANEL: CPT | Performed by: PHYSICIAN ASSISTANT

## 2020-02-19 PROCEDURE — 83605 ASSAY OF LACTIC ACID: CPT | Performed by: PHYSICIAN ASSISTANT

## 2020-02-19 PROCEDURE — 87631 RESP VIRUS 3-5 TARGETS: CPT | Performed by: PHYSICIAN ASSISTANT

## 2020-02-19 PROCEDURE — 99222 1ST HOSP IP/OBS MODERATE 55: CPT | Performed by: PHYSICIAN ASSISTANT

## 2020-02-19 PROCEDURE — 87086 URINE CULTURE/COLONY COUNT: CPT

## 2020-02-19 PROCEDURE — 96361 HYDRATE IV INFUSION ADD-ON: CPT

## 2020-02-19 PROCEDURE — 87077 CULTURE AEROBIC IDENTIFY: CPT

## 2020-02-19 PROCEDURE — 96374 THER/PROPH/DIAG INJ IV PUSH: CPT

## 2020-02-19 PROCEDURE — 81001 URINALYSIS AUTO W/SCOPE: CPT

## 2020-02-19 PROCEDURE — 99285 EMERGENCY DEPT VISIT HI MDM: CPT

## 2020-02-19 PROCEDURE — 36415 COLL VENOUS BLD VENIPUNCTURE: CPT | Performed by: PHYSICIAN ASSISTANT

## 2020-02-19 PROCEDURE — 87186 SC STD MICRODIL/AGAR DIL: CPT

## 2020-02-19 PROCEDURE — 87040 BLOOD CULTURE FOR BACTERIA: CPT | Performed by: PHYSICIAN ASSISTANT

## 2020-02-19 PROCEDURE — 85025 COMPLETE CBC W/AUTO DIFF WBC: CPT | Performed by: PHYSICIAN ASSISTANT

## 2020-02-19 PROCEDURE — 99284 EMERGENCY DEPT VISIT MOD MDM: CPT | Performed by: PHYSICIAN ASSISTANT

## 2020-02-19 RX ORDER — KETOROLAC TROMETHAMINE 30 MG/ML
30 INJECTION, SOLUTION INTRAMUSCULAR; INTRAVENOUS ONCE
Status: COMPLETED | OUTPATIENT
Start: 2020-02-19 | End: 2020-02-19

## 2020-02-19 RX ORDER — SODIUM CHLORIDE 9 MG/ML
100 INJECTION, SOLUTION INTRAVENOUS CONTINUOUS
Status: DISCONTINUED | OUTPATIENT
Start: 2020-02-19 | End: 2020-02-19

## 2020-02-19 RX ORDER — ACETAMINOPHEN 325 MG/1
650 TABLET ORAL EVERY 6 HOURS PRN
Status: DISCONTINUED | OUTPATIENT
Start: 2020-02-19 | End: 2020-02-20

## 2020-02-19 RX ORDER — POTASSIUM CHLORIDE 20 MEQ/1
40 TABLET, EXTENDED RELEASE ORAL ONCE
Status: COMPLETED | OUTPATIENT
Start: 2020-02-19 | End: 2020-02-19

## 2020-02-19 RX ORDER — SODIUM CHLORIDE 9 MG/ML
125 INJECTION, SOLUTION INTRAVENOUS CONTINUOUS
Status: DISCONTINUED | OUTPATIENT
Start: 2020-02-19 | End: 2020-02-22

## 2020-02-19 RX ORDER — ACETAMINOPHEN 325 MG/1
650 TABLET ORAL ONCE
Status: COMPLETED | OUTPATIENT
Start: 2020-02-19 | End: 2020-02-19

## 2020-02-19 RX ADMIN — IOHEXOL 100 ML: 350 INJECTION, SOLUTION INTRAVENOUS at 19:25

## 2020-02-19 RX ADMIN — POTASSIUM CHLORIDE 40 MEQ: 1500 TABLET, EXTENDED RELEASE ORAL at 22:47

## 2020-02-19 RX ADMIN — ACETAMINOPHEN 650 MG: 325 TABLET ORAL at 16:29

## 2020-02-19 RX ADMIN — CEFTRIAXONE 1000 MG: 1 INJECTION, POWDER, FOR SOLUTION INTRAMUSCULAR; INTRAVENOUS at 20:17

## 2020-02-19 RX ADMIN — SODIUM CHLORIDE 1000 ML: 0.9 INJECTION, SOLUTION INTRAVENOUS at 18:22

## 2020-02-19 RX ADMIN — KETOROLAC TROMETHAMINE 30 MG: 30 INJECTION, SOLUTION INTRAMUSCULAR at 19:32

## 2020-02-19 RX ADMIN — SODIUM CHLORIDE 125 ML/HR: 0.9 INJECTION, SOLUTION INTRAVENOUS at 22:43

## 2020-02-19 NOTE — ED PROVIDER NOTES
History  Chief Complaint   Patient presents with    Flu Symptoms     generalized body aches, nausea, headache, pain with breahthing, subjective fevers, denies sick contacts  Patient is a 17-year-old female reported to emergency room with complaint of generalized body aches, complains of nausea no vomiting  Developed fever , complains of left flank pain and urgency with urination  Patient stated her body aches started today however left flank pain and urgency has lasted for several days now  Patient appears well in no acute distress  Temperature of 101 6° while in emergency room  No sick contacts reported  Denies vomiting, diarrhea or abdominal pain  No vaginal discharge or bleeding  None       History reviewed  No pertinent past medical history  Past Surgical History:   Procedure Laterality Date     SECTION  2017       Family History   Problem Relation Age of Onset    No Known Problems Mother     No Known Problems Father     No Known Problems Sister     No Known Problems Brother     Diabetes Maternal Grandmother     No Known Problems Paternal Grandmother     No Known Problems Sister     No Known Problems Brother     No Known Problems Brother     No Known Problems Brother      I have reviewed and agree with the history as documented  Social History     Tobacco Use    Smoking status: Former Smoker     Packs/day: 0 20     Types: Cigarettes     Last attempt to quit: 2018     Years since quittin 1    Smokeless tobacco: Never Used   Substance Use Topics    Alcohol use: Not Currently     Frequency: Never    Drug use: Never       Review of Systems   Constitutional: Positive for chills and fever  Respiratory: Positive for cough  Gastrointestinal: Positive for nausea  Negative for abdominal pain, diarrhea and vomiting  Genitourinary: Positive for dysuria, frequency and urgency  Musculoskeletal: Positive for arthralgias  Skin: Negative      Neurological: Negative for dizziness, weakness and headaches  Physical Exam  Physical Exam   Constitutional: She is oriented to person, place, and time  She appears well-developed and well-nourished  No distress  HENT:   Head: Normocephalic  Eyes: EOM are normal    Neck: Normal range of motion  Cardiovascular: Normal rate and regular rhythm  Pulmonary/Chest: Effort normal    Abdominal: Soft  Musculoskeletal: Normal range of motion  Neurological: She is alert and oriented to person, place, and time  Skin: Skin is warm  Capillary refill takes less than 2 seconds  Nursing note and vitals reviewed  Vital Signs  ED Triage Vitals [02/19/20 1623]   Temperature Pulse Respirations Blood Pressure SpO2   (!) 101 6 °F (38 7 °C) (!) 126 18 144/75 100 %      Temp Source Heart Rate Source Patient Position - Orthostatic VS BP Location FiO2 (%)   Oral Monitor Sitting Right arm --      Pain Score       8           Vitals:    02/19/20 1623 02/19/20 1901 02/19/20 2016   BP: 144/75 132/78 (!) 109/48   Pulse: (!) 126 (!) 112 89   Patient Position - Orthostatic VS: Sitting  Lying         Visual Acuity      ED Medications  Medications   ceftriaxone (ROCEPHIN) 1 g/50 mL in dextrose IVPB (1,000 mg Intravenous New Bag 2/19/20 2017)   acetaminophen (TYLENOL) tablet 650 mg (650 mg Oral Given 2/19/20 1629)   sodium chloride 0 9 % bolus 1,000 mL (0 mL Intravenous Stopped 2/19/20 2006)   ketorolac (TORADOL) injection 30 mg (30 mg Intravenous Given 2/19/20 1932)   iohexol (OMNIPAQUE) 350 MG/ML injection (MULTI-DOSE) 100 mL (100 mL Intravenous Given 2/19/20 1925)       Diagnostic Studies  Results Reviewed     Procedure Component Value Units Date/Time    Lactic acid x2 Q2H [[id number]]  (Normal) Collected:  02/19/20 2011    Lab Status:  Final result Specimen:  Blood from Arm, Left Updated:  02/19/20 2037     LACTIC ACID 0 5 mmol/L     Narrative:       Result may be elevated if tourniquet was used during collection      Blood culture [[id number]] Collected:  02/19/20 2014    Lab Status: In process Specimen:  Blood from Hand, Right Updated:  02/19/20 2021    Blood culture #1 [[id number]] Collected:  02/19/20 2011    Lab Status:   In process Specimen:  Blood from Arm, Left Updated:  02/19/20 2017    Lactic acid x2 Q2H [[id number]]     Lab Status:  No result Specimen:  Blood     Comprehensive metabolic panel [[id number]]  (Abnormal) Collected:  02/19/20 1822    Lab Status:  Final result Specimen:  Blood from Arm, Right Updated:  02/19/20 1908     Sodium 136 mmol/L      Potassium 3 0 mmol/L      Chloride 102 mmol/L      CO2 22 mmol/L      ANION GAP 12 mmol/L      BUN 8 mg/dL      Creatinine 0 77 mg/dL      Glucose 135 mg/dL      Calcium 8 8 mg/dL      AST 16 U/L      ALT 19 U/L      Alkaline Phosphatase 77 U/L      Total Protein 7 5 g/dL      Albumin 3 6 g/dL      Total Bilirubin 0 49 mg/dL      eGFR 106 ml/min/1 73sq m     Narrative:       [place] guidelines for Chronic Kidney Disease (CKD):     Stage 1 with normal or high GFR (GFR > 90 mL/min/1 73 square meters)    Stage 2 Mild CKD (GFR = 60-89 mL/min/1 73 square meters)    Stage 3A Moderate CKD (GFR = 45-59 mL/min/1 73 square meters)    Stage 3B Moderate CKD (GFR = 30-44 mL/min/1 73 square meters)    Stage 4 Severe CKD (GFR = 15-29 mL/min/1 73 square meters)    Stage 5 End Stage CKD (GFR <15 mL/min/1 73 square meters)  Note: GFR calculation is accurate only with a steady state creatinine    CBC and differential [[id number]]  (Abnormal) Collected:  02/19/20 1822    Lab Status:  Final result Specimen:  Blood from Arm, Right Updated:  02/19/20 1829     WBC 11 48 Thousand/uL      RBC 4 33 Million/uL      Hemoglobin 12 8 g/dL      Hematocrit 38 6 %      MCV 89 fL      MCH 29 6 pg      MCHC 33 2 g/dL      RDW 12 8 %      MPV 11 0 fL      Platelets 406 Thousands/uL      nRBC 0 /100 WBCs      Neutrophils Relative 87 %      Immat GRANS % 0 %      Lymphocytes Relative 7 % Monocytes Relative 6 %      Eosinophils Relative 0 %      Basophils Relative 0 %      Neutrophils Absolute 9 93 Thousands/µL      Immature Grans Absolute 0 04 Thousand/uL      Lymphocytes Absolute 0 79 Thousands/µL      Monocytes Absolute 0 70 Thousand/µL      Eosinophils Absolute 0 01 Thousand/µL      Basophils Absolute 0 01 Thousands/µL     Urine Microscopic [[id number]]  (Abnormal) Collected:  02/19/20 1739    Lab Status:  Final result Specimen:  Urine, Clean Catch Updated:  02/19/20 1822     RBC, UA 0-1 /hpf      WBC, UA 20-30 /hpf      Epithelial Cells Occasional /hpf      Bacteria, UA Occasional /hpf      MUCUS THREADS Occasional    Urine culture [[id number]] Collected:  02/19/20 1739    Lab Status:   In process Specimen:  Urine, Clean Catch Updated:  02/19/20 1822    Influenza A/B and RSV PCR [[id number]]  (Normal) Collected:  02/19/20 1711    Lab Status:  Final result Specimen:  Nasopharyngeal Swab Updated:  02/19/20 1755     INFLUENZA A PCR None Detected     INFLUENZA B PCR None Detected     RSV PCR None Detected    POCT urinalysis dipstick [[id number]]  (Abnormal) Resulted:  02/19/20 1741    Lab Status:  Final result Updated:  02/19/20 1741     Color, UA Yellow     Clarity, UA Clear    POCT pregnancy, urine [[id number]]  (Normal) Resulted:  02/19/20 1741    Lab Status:  Final result Updated:  02/19/20 1741     EXT PREG TEST UR (Ref: Negative) Negative     Control Valid    Urine Macroscopic, POC [[id number]]  (Abnormal) Collected:  02/19/20 1739    Lab Status:  Final result Specimen:  Urine Updated:  02/19/20 1740     Color, UA Yellow     Clarity, UA Clear     pH, UA 6 0     Leukocytes, UA Trace     Nitrite, UA Negative     Protein, UA Negative mg/dl      Glucose, UA Negative mg/dl      Ketones, UA 15 (1+) mg/dl      Urobilinogen, UA 0 2 E U /dl      Bilirubin, UA Negative     Blood, UA Small     Specific [place], UA 1 010    Narrative:       CLINITEK RESULT                 CT abdomen pelvis with contrast   Final Result by Cary Noel MD (02/19 1947)      Focal left lower pole pyelonephritis  Mild bladder wall thickening and stranding fat stranding in keeping with cystitis  The study was marked in [facility] for immediate notification  Workstation performed: [id number]         XR chest 2 views   ED Interpretation by Maddie Blue PA-C (02/19 1732)   No apparent infiltrate                  Procedures  Procedures         ED Course                               MDM  Number of Diagnoses or Management Options  Pyelonephritis:   Diagnosis management comments: Patient reported fever, body aches, 7 days of intermittent urinary symptoms and left flank pain  Flu test negative, mild leukocytosis, fever of 101 9, tachycardia  Patient appears well in no acute distress  CT consistent with left-sided pyelonephritis, lactic and blood cultures pending  IV Rocephin started  IV fluids given  P o  Tylenol and Toradol for fever control provided  Discussed the case with hospitalist work note of motion  Amount and/or Complexity of Data Reviewed  Clinical lab tests: ordered and reviewed  Tests in the radiology section of CPT®: ordered and reviewed    Patient Progress  Patient progress: improved        Disposition  Final diagnoses:   Pyelonephritis     Time reflects when diagnosis was documented in both MDM as applicable and the Disposition within this note     Time User Action Codes Description Comment    2/19/2020  8:35 PM Whitney Thakkar Add [N12] Pyelonephritis       ED Disposition     ED Disposition Condition Date/Time Comment    Admit Stable Wed Feb 19, 2020  8:13 PM Case was discussed with Dr Stephen Rivas and the patient's admission status was agreed to be Admission Status: inpatient status to the service of [address] [place] [address]   Follow-up Information    None         Patient's Medications    No medications on file     No discharge procedures on file      PDMP Review       Value Time User    PDMP Reviewed  Yes 10/9/2019 10:26 AM Néstor Nuno PA-C          ED Provider  Electronically Signed by           Margaret Chery PA-C  02/19/20 2042

## 2020-02-20 LAB
ANION GAP SERPL CALCULATED.3IONS-SCNC: 12 MMOL/L (ref 4–13)
BUN SERPL-MCNC: 4 MG/DL (ref 5–25)
CALCIUM SERPL-MCNC: 8.3 MG/DL (ref 8.3–10.1)
CHLORIDE SERPL-SCNC: 108 MMOL/L (ref 100–108)
CO2 SERPL-SCNC: 20 MMOL/L (ref 21–32)
CREAT SERPL-MCNC: 0.65 MG/DL (ref 0.6–1.3)
ERYTHROCYTE [DISTWIDTH] IN BLOOD BY AUTOMATED COUNT: 13.1 % (ref 11.6–15.1)
GFR SERPL CREATININE-BSD FRML MDRD: 122 ML/MIN/1.73SQ M
GLUCOSE SERPL-MCNC: 138 MG/DL (ref 65–140)
HCT VFR BLD AUTO: 37.2 % (ref 34.8–46.1)
HGB BLD-MCNC: 12.4 G/DL (ref 11.5–15.4)
LACTATE SERPL-SCNC: 0.4 MMOL/L (ref 0.5–2)
MCH RBC QN AUTO: 30.1 PG (ref 26.8–34.3)
MCHC RBC AUTO-ENTMCNC: 33.3 G/DL (ref 31.4–37.4)
MCV RBC AUTO: 90 FL (ref 82–98)
PLATELET # BLD AUTO: 146 THOUSANDS/UL (ref 149–390)
PLATELET # BLD AUTO: 156 THOUSANDS/UL (ref 149–390)
PMV BLD AUTO: 11.1 FL (ref 8.9–12.7)
PMV BLD AUTO: 11.3 FL (ref 8.9–12.7)
POTASSIUM SERPL-SCNC: 3.2 MMOL/L (ref 3.5–5.3)
PROCALCITONIN SERPL-MCNC: 0.21 NG/ML
PROCALCITONIN SERPL-MCNC: 0.26 NG/ML
RBC # BLD AUTO: 4.12 MILLION/UL (ref 3.81–5.12)
SODIUM SERPL-SCNC: 140 MMOL/L (ref 136–145)
WBC # BLD AUTO: 10.25 THOUSAND/UL (ref 4.31–10.16)

## 2020-02-20 PROCEDURE — 87491 CHLMYD TRACH DNA AMP PROBE: CPT | Performed by: PHYSICIAN ASSISTANT

## 2020-02-20 PROCEDURE — 99232 SBSQ HOSP IP/OBS MODERATE 35: CPT | Performed by: FAMILY MEDICINE

## 2020-02-20 PROCEDURE — 84145 PROCALCITONIN (PCT): CPT | Performed by: PHYSICIAN ASSISTANT

## 2020-02-20 PROCEDURE — 80048 BASIC METABOLIC PNL TOTAL CA: CPT | Performed by: PHYSICIAN ASSISTANT

## 2020-02-20 PROCEDURE — 87591 N.GONORRHOEAE DNA AMP PROB: CPT | Performed by: PHYSICIAN ASSISTANT

## 2020-02-20 PROCEDURE — 85027 COMPLETE CBC AUTOMATED: CPT | Performed by: PHYSICIAN ASSISTANT

## 2020-02-20 RX ORDER — POTASSIUM CHLORIDE 20 MEQ/1
40 TABLET, EXTENDED RELEASE ORAL ONCE
Status: COMPLETED | OUTPATIENT
Start: 2020-02-20 | End: 2020-02-20

## 2020-02-20 RX ORDER — ACETAMINOPHEN 325 MG/1
650 TABLET ORAL EVERY 6 HOURS PRN
Status: DISCONTINUED | OUTPATIENT
Start: 2020-02-20 | End: 2020-02-22 | Stop reason: HOSPADM

## 2020-02-20 RX ORDER — KETOROLAC TROMETHAMINE 30 MG/ML
15 INJECTION, SOLUTION INTRAMUSCULAR; INTRAVENOUS EVERY 6 HOURS PRN
Status: DISCONTINUED | OUTPATIENT
Start: 2020-02-20 | End: 2020-02-22 | Stop reason: HOSPADM

## 2020-02-20 RX ADMIN — SODIUM CHLORIDE 125 ML/HR: 0.9 INJECTION, SOLUTION INTRAVENOUS at 15:36

## 2020-02-20 RX ADMIN — POTASSIUM CHLORIDE 40 MEQ: 1500 TABLET, EXTENDED RELEASE ORAL at 10:03

## 2020-02-20 RX ADMIN — ENOXAPARIN SODIUM 40 MG: 40 INJECTION SUBCUTANEOUS at 08:14

## 2020-02-20 RX ADMIN — CEFTRIAXONE 1000 MG: 1 INJECTION, POWDER, FOR SOLUTION INTRAMUSCULAR; INTRAVENOUS at 21:00

## 2020-02-20 RX ADMIN — ACETAMINOPHEN 650 MG: 325 TABLET ORAL at 15:36

## 2020-02-20 RX ADMIN — SODIUM CHLORIDE 125 ML/HR: 0.9 INJECTION, SOLUTION INTRAVENOUS at 06:24

## 2020-02-20 RX ADMIN — ACETAMINOPHEN 650 MG: 325 TABLET ORAL at 05:42

## 2020-02-20 NOTE — PROGRESS NOTES
Patient complaining of "painful bump" in genital region  Pain not worse with urination, patient stated she stated feeling bump yesterday while at home  Wants this addressed with MD while here; Dr Julian Hopkins made aware  Will continue to monitor      Rose Chapman RN 2/20/2020 4:37 PM

## 2020-02-20 NOTE — ASSESSMENT & PLAN NOTE
· Patient presented with generalized body aches, fever, left flank pain, increased urinary frequency and urgency, and suprapubic pressure  · CT revealed focal left lower pole pyelo and mild bladder wall thickening and fat stranding in keeping with cystitis   Continue IV Rocephin and follow-up urine and blood cultures

## 2020-02-20 NOTE — ASSESSMENT & PLAN NOTE
· Present on admission as evidenced by fever, tachycardia, mild-leukocytosis (WBCs 11 48)  Suspected source of infection urine/pyelo  · Received IV ceftriaxone in ED - continue  · Urine culture pending   Patient also reporting vaginal discharge - add on GC/Chlamydia to urine  · Blood cultures pending x2  · CBC in AM  · Lactic WNL on admission  · Influenza A/B and RSV all negative  · Place on IVF

## 2020-02-20 NOTE — PLAN OF CARE
Problem: METABOLIC, FLUID AND ELECTROLYTES - ADULT  Goal: Electrolytes maintained within normal limits  Description  INTERVENTIONS:  - Monitor labs and assess patient for signs and symptoms of electrolyte imbalances  - Administer electrolyte replacement as ordered  - Monitor response to electrolyte replacements, including repeat lab results as appropriate  - Instruct patient on fluid and nutrition as appropriate  Outcome: Progressing  Goal: Fluid balance maintained  Description  INTERVENTIONS:  - Monitor labs   - Monitor I/O and WT  - Instruct patient on fluid and nutrition as appropriate  - Assess for signs & symptoms of volume excess or deficit  Outcome: Progressing  Goal: Glucose maintained within target range  Description  INTERVENTIONS:  - Monitor Blood Glucose as ordered  - Assess for signs and symptoms of hyperglycemia and hypoglycemia  - Administer ordered medications to maintain glucose within target range  - Assess nutritional intake and initiate nutrition service referral as needed  Outcome: Progressing     Problem: PAIN - ADULT  Goal: Verbalizes/displays adequate comfort level or baseline comfort level  Description  Interventions:  - Encourage patient to monitor pain and request assistance  - Assess pain using appropriate pain scale  - Administer analgesics based on type and severity of pain and evaluate response  - Implement non-pharmacological measures as appropriate and evaluate response  - Consider cultural and social influences on pain and pain management  - Notify physician/advanced practitioner if interventions unsuccessful or patient reports new pain  Outcome: Progressing     Problem: INFECTION - ADULT  Goal: Absence or prevention of progression during hospitalization  Description  INTERVENTIONS:  - Assess and monitor for signs and symptoms of infection  - Monitor lab/diagnostic results  - Monitor all insertion sites, i e  indwelling lines, tubes, and drains  - Monitor endotracheal if appropriate and nasal secretions for changes in amount and color  - [place] appropriate cooling/warming therapies per order  - Administer medications as ordered  - Instruct and encourage patient and family to use good hand hygiene technique  - Identify and instruct in appropriate isolation precautions for identified infection/condition  Outcome: Progressing  Goal: Absence of fever/infection during neutropenic period  Description  INTERVENTIONS:  - Monitor WBC    Outcome: Progressing     Problem: SAFETY ADULT  Goal: Patient will remain free of falls  Description  INTERVENTIONS:  - Assess patient frequently for physical needs  -  Identify cognitive and physical deficits and behaviors that affect risk of falls    -  [place] fall precautions as indicated by assessment   - Educate patient/family on patient safety including physical limitations  - Instruct patient to call for assistance with activity based on assessment  - Modify environment to reduce risk of injury  - Consider OT/PT consult to assist with strengthening/mobility  Outcome: Progressing  Goal: Maintain or return to baseline ADL function  Description  INTERVENTIONS:  -  Assess patient's ability to carry out ADLs; assess patient's baseline for ADL function and identify physical deficits which impact ability to perform ADLs (bathing, care of mouth/teeth, toileting, grooming, dressing, etc )  - Assess/evaluate cause of self-care deficits   - Assess range of motion  - Assess patient's mobility; develop plan if impaired  - Assess patient's need for assistive devices and provide as appropriate  - Encourage maximum independence but intervene and supervise when necessary  - Involve family in performance of ADLs  - Assess for home care needs following discharge   - Consider OT consult to assist with ADL evaluation and planning for discharge  - Provide patient education as appropriate  Outcome: Progressing  Goal: Maintain or return mobility status to optimal level  Description  INTERVENTIONS:  - Assess patient's baseline mobility status (ambulation, transfers, stairs, etc )    - Identify cognitive and physical deficits and behaviors that affect mobility  - Identify mobility aids required to assist with transfers and/or ambulation (gait belt, sit-to-stand, lift, walker, cane, etc )  - Stephen fall precautions as indicated by assessment  - Record patient progress and toleration of activity level on Mobility SBAR; progress patient to next Phase/Stage  - Instruct patient to call for assistance with activity based on assessment  - Consider rehabilitation consult to assist with strengthening/weightbearing, etc   Outcome: Progressing     Problem: DISCHARGE PLANNING  Goal: Discharge to home or other facility with appropriate resources  Description  INTERVENTIONS:  - Identify barriers to discharge w/patient and caregiver  - Arrange for needed discharge resources and transportation as appropriate  - Identify discharge learning needs (meds, wound care, etc )  - Arrange for interpretive services to assist at discharge as needed  - Refer to Case Management Department for coordinating discharge planning if the patient needs post-hospital services based on physician/advanced practitioner order or complex needs related to functional status, cognitive ability, or social support system  Outcome: Progressing

## 2020-02-20 NOTE — H&P
H&P- Roc Fu 1990, 34 y o  female MRN: [id number]    Unit/Bed#: ED 06 Encounter: [id number]    Primary Care Provider: Jhon Luevano MD   Date and time admitted to hospital: 2/19/2020  4:24 PM        * Sepsis [facility])  Assessment & Plan  · Present on admission as evidenced by fever, tachycardia, mild-leukocytosis (WBCs 11 48)  Suspected source of infection urine/pyelo  · Received IV ceftriaxone in ED - continue  · Blood cultures pending x2  · CBC in AM  · Lactic WNL on admission  · Influenza A/B and RSV all negative    Pyelonephritis of left kidney  Assessment & Plan  · Patient presented with generalized body aches, fever, left flank pain, increased urinary frequency and urgency, and suprapubic pressure  · CT revealed focal left lower pole pyelo and mild bladder wall thickening and fat stranding in keeping with cystitis   · S/p IV ceftriaxone in ED - continue   · No prior urine culture available for review   · Follow up urine culture     Hypokalemia  Assessment & Plan  · Potassium 3 0 on admission  Replete and monitor  · BMP in AM      VTE Prophylaxis: Enoxaparin (Lovenox)  / sequential compression device   Code Status: FULL CODE   POLST: POLST form is not discussed and not completed at this time  Discussion: with ED AP    Anticipated Length of Stay:  Patient will be admitted on an Inpatient basis with an anticipated length of stay of  > 2 midnights  Justification for Hospital Stay: sepsis POA on IV abx with pyelo, blood cultures pending    Total Time for Visit, including Counseling / Coordination of Care: 45 minutes  Greater than 50% of this total time spent on direct patient counseling and coordination of care  Chief Complaint:   "I have a lot of pain in my body"    History of Present Illness:    Roc Fu is a 34 y o  female who presents with complaint of "I have a lot of pain in my body"   She is primarily [place]-speaking, however does speak English, and when asked is she would prefer to use the  phone she refused  She reports generalized body aches for the last 2 days  She reports left-sided flank pain x 1 week  She describes the pain as a "twisting"  She says the pain is improved since being in the ED  She reports fever and chills at home for the last 2 days  She also reports congestion and chest tightness  She reports increased urinary frequency and urgency  She reports suprapubic "pressure"  She also reports vaginal discharge for the last 2 days  She reports that she is sexually active and does not use protection  Review of Systems:    Review of Systems   Constitutional: Positive for chills and fever  Negative for activity change  HENT: Positive for congestion  Respiratory: Positive for chest tightness  Negative for shortness of breath  Cardiovascular: Negative for leg swelling  Gastrointestinal: Negative for abdominal pain, constipation, diarrhea, nausea and vomiting  Genitourinary: Positive for flank pain (left), frequency, urgency and vaginal discharge  Musculoskeletal: Negative for gait problem  Skin: Negative for rash  Neurological: Negative for weakness  Psychiatric/Behavioral: The patient is not nervous/anxious  Past Medical and Surgical History:     History reviewed  No pertinent past medical history  Past Surgical History:   Procedure Laterality Date     SECTION  2017       Meds/Allergies:    Prior to Admission medications    Not on File     I have reviewed home medications with patient personally  Allergies:    Allergies   Allergen Reactions    Meperidine Rash       Social History:     Marital Status: Single   Patient Pre-hospital Living Situation: Home  Patient Pre-hospital Level of Mobility: ambulatory  Patient Pre-hospital Diet Restrictions: None reported   Substance Use History:   Social History     Substance and Sexual Activity   Alcohol Use Not Currently    Frequency: Never     Social History     Tobacco Use Smoking Status Former Smoker    Packs/day: 0 20    Types: Cigarettes    Last attempt to quit: 2018    Years since quittin 1   Smokeless Tobacco Never Used     Social History     Substance and Sexual Activity   Drug Use Never       Family History:    Family History   Problem Relation Age of Onset    No Known Problems Mother     No Known Problems Father     No Known Problems Sister     No Known Problems Brother     Diabetes Maternal Grandmother     No Known Problems Paternal Grandmother     No Known Problems Sister     No Known Problems Brother     No Known Problems Brother     No Known Problems Brother        Physical Exam:     Vitals:   Blood Pressure: (!) 109/48 (20)  Pulse: 89 (20)  Temperature: 99 °F (37 2 °C) (20)  Temp Source: Oral (20)  Respirations: 16 (20)  Weight - Scale: 74 1 kg (163 lb 5 8 oz) (20 1623)  SpO2: 98 % (20)    Physical Exam   Constitutional: She is oriented to person, place, and time  No distress  Patient seen sitting in ED bed comfortably resting  Pleasant and cooperative  On the phone when I entered the room   Cardiovascular: Regular rhythm  Tachycardia present  Pulmonary/Chest: Effort normal and breath sounds normal  No respiratory distress  She has no wheezes  Abdominal: Soft  Bowel sounds are normal  There is no tenderness  Genitourinary:   Genitourinary Comments: Left flank pain   Musculoskeletal: She exhibits no edema  Neurological: She is alert and oriented to person, place, and time  Skin: Skin is warm  She is not diaphoretic  Psychiatric: She has a normal mood and affect  Her behavior is normal    Vitals reviewed  Additional Data:     Lab Results: I have personally reviewed pertinent reports        Results from last 7 days   Lab Units 20  1822   WBC Thousand/uL 11 48*   HEMOGLOBIN g/dL 12 8   HEMATOCRIT % 38 6   PLATELETS Thousands/uL 153   NEUTROS PCT % 87*   LYMPHS PCT % 7*   MONOS PCT % 6   EOS PCT % 0     Results from last 7 days   Lab Units 02/19/20  1822   SODIUM mmol/L 136   POTASSIUM mmol/L 3 0*   CHLORIDE mmol/L 102   CO2 mmol/L 22   BUN mg/dL 8   CREATININE mg/dL 0 77   ANION GAP mmol/L 12   CALCIUM mg/dL 8 8   ALBUMIN g/dL 3 6   TOTAL BILIRUBIN mg/dL 0 49   ALK PHOS U/L 77   ALT U/L 19   AST U/L 16   GLUCOSE RANDOM mg/dL 135                 Results from last 7 days   Lab Units 02/19/20 2011   LACTIC ACID mmol/L 0 5       Imaging: I have personally reviewed pertinent reports  CT abdomen pelvis with contrast   Final Result by Alize Quintero MD (02/19 1947)      Focal left lower pole pyelonephritis  Mild bladder wall thickening and stranding fat stranding in keeping with cystitis  The study was marked in [facility] for immediate notification  Workstation performed: [id number]         XR chest 2 views   ED Interpretation by Jhonny Martinez PA-C (02/19 1732)   No apparent infiltrate           EKG, Pathology, and Other Studies Reviewed on Admission:   · Prior urine culture with no growth  [address] [place] [address] from 1/30/2020 negative    Allscripts / Epic Records Reviewed: Yes     ** Please Note: This note has been constructed using a voice recognition system   **

## 2020-02-20 NOTE — PLAN OF CARE
Problem: METABOLIC, FLUID AND ELECTROLYTES - ADULT  Goal: Electrolytes maintained within normal limits  Description  INTERVENTIONS:  - Monitor labs and assess patient for signs and symptoms of electrolyte imbalances  - Administer electrolyte replacement as ordered  - Monitor response to electrolyte replacements, including repeat lab results as appropriate  - Instruct patient on fluid and nutrition as appropriate  Outcome: Progressing  Goal: Fluid balance maintained  Description  INTERVENTIONS:  - Monitor labs   - Monitor I/O and WT  - Instruct patient on fluid and nutrition as appropriate  - Assess for signs & symptoms of volume excess or deficit  Outcome: Progressing  Goal: Glucose maintained within target range  Description  INTERVENTIONS:  - Monitor Blood Glucose as ordered  - Assess for signs and symptoms of hyperglycemia and hypoglycemia  - Administer ordered medications to maintain glucose within target range  - Assess nutritional intake and initiate nutrition service referral as needed  Outcome: Progressing     Problem: PAIN - ADULT  Goal: Verbalizes/displays adequate comfort level or baseline comfort level  Description  Interventions:  - Encourage patient to monitor pain and request assistance  - Assess pain using appropriate pain scale  - Administer analgesics based on type and severity of pain and evaluate response  - Implement non-pharmacological measures as appropriate and evaluate response  - Consider cultural and social influences on pain and pain management  - Notify physician/advanced practitioner if interventions unsuccessful or patient reports new pain  Outcome: Progressing     Problem: INFECTION - ADULT  Goal: Absence or prevention of progression during hospitalization  Description  INTERVENTIONS:  - Assess and monitor for signs and symptoms of infection  - Monitor lab/diagnostic results  - Monitor all insertion sites, i e  indwelling lines, tubes, and drains  - Monitor endotracheal if appropriate and nasal secretions for changes in amount and color  - [place] appropriate cooling/warming therapies per order  - Administer medications as ordered  - Instruct and encourage patient and family to use good hand hygiene technique  - Identify and instruct in appropriate isolation precautions for identified infection/condition  Outcome: Progressing  Goal: Absence of fever/infection during neutropenic period  Description  INTERVENTIONS:  - Monitor WBC    Outcome: Progressing     Problem: SAFETY ADULT  Goal: Patient will remain free of falls  Description  INTERVENTIONS:  - Assess patient frequently for physical needs  -  Identify cognitive and physical deficits and behaviors that affect risk of falls    -  [place] fall precautions as indicated by assessment   - Educate patient/family on patient safety including physical limitations  - Instruct patient to call for assistance with activity based on assessment  - Modify environment to reduce risk of injury  - Consider OT/PT consult to assist with strengthening/mobility  Outcome: Progressing  Goal: Maintain or return to baseline ADL function  Description  INTERVENTIONS:  -  Assess patient's ability to carry out ADLs; assess patient's baseline for ADL function and identify physical deficits which impact ability to perform ADLs (bathing, care of mouth/teeth, toileting, grooming, dressing, etc )  - Assess/evaluate cause of self-care deficits   - Assess range of motion  - Assess patient's mobility; develop plan if impaired  - Assess patient's need for assistive devices and provide as appropriate  - Encourage maximum independence but intervene and supervise when necessary  - Involve family in performance of ADLs  - Assess for home care needs following discharge   - Consider OT consult to assist with ADL evaluation and planning for discharge  - Provide patient education as appropriate  Outcome: Progressing  Goal: Maintain or return mobility status to optimal level  Description  INTERVENTIONS:  - Assess patient's baseline mobility status (ambulation, transfers, stairs, etc )    - Identify cognitive and physical deficits and behaviors that affect mobility  - Identify mobility aids required to assist with transfers and/or ambulation (gait belt, sit-to-stand, lift, walker, cane, etc )  - [place] fall precautions as indicated by assessment  - Record patient progress and toleration of activity level on Mobility SBAR; progress patient to next Phase/Stage  - Instruct patient to call for assistance with activity based on assessment  - Consider rehabilitation consult to assist with strengthening/weightbearing, etc   Outcome: Progressing     Problem: DISCHARGE PLANNING  Goal: Discharge to home or other facility with appropriate resources  Description  INTERVENTIONS:  - Identify barriers to discharge w/patient and caregiver  - Arrange for needed discharge resources and transportation as appropriate  - Identify discharge learning needs (meds, wound care, etc )  - Arrange for interpretive services to assist at discharge as needed  - Refer to Case Management Department for coordinating discharge planning if the patient needs post-hospital services based on physician/advanced practitioner order or complex needs related to functional status, cognitive ability, or social support system  Outcome: Progressing     Problem: Potential for Falls  Goal: Patient will remain free of falls  Description  INTERVENTIONS:  - Assess patient frequently for physical needs  -  Identify cognitive and physical deficits and behaviors that affect risk of falls    -  [place] fall precautions as indicated by assessment   - Educate patient/family on patient safety including physical limitations  - Instruct patient to call for assistance with activity based on assessment  - Modify environment to reduce risk of injury  - Consider OT/PT consult to assist with strengthening/mobility  Outcome: Progressing Problem: Knowledge Deficit  Goal: Patient/family/caregiver demonstrates understanding of disease process, treatment plan, medications, and discharge instructions  Description  Complete learning assessment and assess knowledge base    Interventions:  - Provide teaching at level of understanding  - Provide teaching via preferred learning methods  Outcome: Progressing     Problem: GENITOURINARY - ADULT  Goal: Maintains or returns to baseline urinary function  Description  INTERVENTIONS:  - Assess urinary function  - Encourage oral fluids to ensure adequate hydration if ordered  - Administer IV fluids as ordered to ensure adequate hydration  - Administer ordered medications as needed  - Offer frequent toileting  - Follow urinary retention protocol if ordered  Outcome: Progressing  Goal: Absence of urinary retention  Description  INTERVENTIONS:  - Assess patients ability to void and empty bladder  - Monitor I/O  - Bladder scan as needed  - Discuss with physician/AP medications to alleviate retention as needed  - Discuss catheterization for long term situations as appropriate  Outcome: Progressing  Goal: Urinary catheter remains patent  Description  INTERVENTIONS:  - Assess patency of urinary catheter  - If patient has a chronic anaya, consider changing catheter if non-functioning  - Follow guidelines for intermittent irrigation of non-functioning urinary catheter  Outcome: Progressing

## 2020-02-20 NOTE — ASSESSMENT & PLAN NOTE
· Patient presented with generalized body aches, fever, left flank pain, increased urinary frequency and urgency, and suprapubic pressure  · CT revealed focal left lower pole pyelo and mild bladder wall thickening and fat stranding in keeping with cystitis   · S/p IV ceftriaxone in ED - continue   · No prior urine culture available for review   · Follow up urine culture

## 2020-02-20 NOTE — UTILIZATION REVIEW
Initial Clinical Review    Admission: Date/Time/Statement: Admission Orders (From admission, onward)     Ordered        02/19/20 2014  Inpatient Admission (expected length of stay for this patient Order details is greater than two midnights)  Once                   Orders Placed This Encounter   Procedures    Inpatient Admission (expected length of stay for this patient Order details is greater than two midnights)     Standing Status:   Standing     Number of Occurrences:   1     Order Specific Question:   Admitting Physician     Answer:   Reather Halsted [[id number]]     Order Specific Question:   Level of Care     Answer:   Med Surg [[id number]]     Order Specific Question:   Estimated length of stay     Answer:   Not Applicable     ED Arrival Information     Expected Arrival Acuity Means of Arrival Escorted By Service Admission Type    - 2/19/2020 16:15 Urgent Walk-In Self General Medicine Urgent    Arrival Complaint    Pain        Chief Complaint   Patient presents with    Flu Symptoms     generalized body aches, nausea, headache, pain with breahthing, subjective fevers, denies sick contacts  Assessment/Plan:  33 yo female presents to ED from home with body aches, nausea, fever, Left flank pain & urgency w/ urination  Left flank pain & urgency for several days, body aches started today  Tachycardic  and Febrile in ED  CT consistent with left-sided pyelonephritis  REc'd IVF & IV Abxs in ED  Admitted to IP with Sepsis 2ndary to Pyelonephritis & Hypokalemia  Plan: IVF's, IV Abxs, F/U on cxs, Replete K and monitor    2/20: Continues w/ Left sided abd pain - rates 5/10  Added Toradol for pain  K 3 2 - replete po  Continue IVF's and IV Abx    F/U on Cx's - pending    ED Triage Vitals [02/19/20 1623]   Temperature Pulse Respirations Blood Pressure SpO2   (!) 101 6 °F (38 7 °C) (!) 126 18 144/75 100 %      Temp Source Heart Rate Source Patient Position - Orthostatic VS BP Location FiO2 (%)   Oral Monitor Sitting Right arm --      Pain Score       8        Wt Readings from Last 1 Encounters:   02/19/20 74 1 kg (163 lb 5 8 oz)     Additional Vital Signs:   02/20/20 07:16:05  98 6 °F (37 °C)  90  16  119/70 96 % -- --   02/19/20 2306  --  --  --  -- -- None (Room air) --   02/19/20 21:44:01  98 1 °F (36 7 °C)  84  16  115/55 97 % -- --   02/19/20 2020  99 °F (37 2 °C)  --  --  -- -- -- --   02/19/20 2016  --  89  16  109/48Abn 98 % None (Room air) Lying   02/19/20 1901  101 9 °F (38 8 °C)  112Abn  18  132/78 98 % None (Room air) --       Pertinent Labs/Diagnostic Test Results:   Results from last 7 days   Lab Units 02/20/20 0534 02/19/20 2358 02/19/20  1822   WBC Thousand/uL 10 25*  --  11 48*   HEMOGLOBIN g/dL 12 4  --  12 8   HEMATOCRIT % 37 2  --  38 6   PLATELETS Thousands/uL 156 146* 153   NEUTROS ABS Thousands/µL  --   --  9 93*     Results from last 7 days   Lab Units 02/20/20  0534 02/19/20  1822   SODIUM mmol/L 140 136   POTASSIUM mmol/L 3 2* 3 0*   CHLORIDE mmol/L 108 102   CO2 mmol/L 20* 22   ANION GAP mmol/L 12 12   BUN mg/dL 4* 8   CREATININE mg/dL 0 65 0 77   EGFR ml/min/1 73sq m 122 106   CALCIUM mg/dL 8 3 8 8     Results from last 7 days   Lab Units 02/19/20  1822   AST U/L 16   ALT U/L 19   ALK PHOS U/L 77   TOTAL PROTEIN g/dL 7 5   ALBUMIN g/dL 3 6   TOTAL BILIRUBIN mg/dL 0 49     Results from last 7 days   Lab Units 02/20/20  0534 02/19/20  1822   GLUCOSE RANDOM mg/dL 138 135     Results from last 7 days   Lab Units 02/20/20 0534 02/19/20  2357   PROCALCITONIN ng/ml 0 21 0 26*     Results from last 7 days   Lab Units 02/19/20 2351 02/19/20 2011   LACTIC ACID mmol/L 0 4* 0 5     Results from last 7 days   Lab Units 02/19/20  1741 02/19/20  1739   CLARITY UA  Clear Clear   COLOR UA  Yellow Yellow   SPEC GRAV UA   --  1 010   PH UA   --  6 0   GLUCOSE UA mg/dl  --  Negative   KETONES UA mg/dl  --  15 (1+)*   BLOOD UA   --  Small*   PROTEIN UA mg/dl  --  Negative   NITRITE UA   --  Negative   BILIRUBIN UA   -- Negative   UROBILINOGEN UA E U /dl  --  0 2   LEUKOCYTES UA   --  Trace*   WBC UA /hpf  --  20-30*   RBC UA /hpf  --  0-1*   BACTERIA UA /hpf  --  Occasional   EPITHELIAL CELLS WET PREP /hpf  --  Occasional   MUCUS THREADS   --  Occasional*     Results from last 7 days   Lab Units 02/19/20  1711   INFLUENZA A PCR  None Detected   INFLUENZA B PCR  None Detected   RSV PCR  None Detected     Results from last 7 days   Lab Units 02/19/20 2014 02/19/20 2011   BLOOD CULTURE  Received in Microbiology Lab  Culture in Progress  Received in Microbiology Lab  Culture in Progress  2/19 Urine CX PEnding  2/29 CXR: No acute cardiopulmonary disease  2/19 CT A&P:  Focal left lower pole pyelonephritis  Mild bladder wall thickening and stranding fat stranding in keeping with cystitis  ED Treatment:   Medication Administration from 02/19/2020 1615 to 02/19/2020 2137       Date/Time Order Dose Route Action     02/19/2020 1629 acetaminophen (TYLENOL) tablet 650 mg 650 mg Oral Given     02/19/2020 1822 sodium chloride 0 9 % bolus 1,000 mL 1,000 mL Intravenous New Bag     02/19/2020 1932 ketorolac (TORADOL) injection 30 mg 30 mg Intravenous Given     02/19/2020 2017 ceftriaxone (ROCEPHIN) 1 g/50 mL in dextrose IVPB 1,000 mg Intravenous New Bag          Admitting Diagnosis: Pyelonephritis [N12]  Flu-like symptoms [R68 89]     Age/Sex: 34 y o  female  Admission Orders:  Scheduled Medications:  Medications:  cefTRIAXone 1,000 mg Intravenous Q24H   enoxaparin 40 mg Subcutaneous Daily     Continuous IV Infusions:  sodium chloride 125 mL/hr Intravenous Continuous     PRN Meds:  acetaminophen 650 mg Oral Q6H PRN x 1 2/20   ketorolac 15 mg Intravenous Q6H PRN     KCL 40 mEQ  Po x 1 2/19  &  X 1 2/20    Network Utilization Review Department  [email or web address]  ATTENTION: Please call with any questions or concerns to [phone] and carefully listen to the prompts so that you are directed to the right person  All voicemails are confidential   Mat Bicker all requests for admission clinical reviews, approved or denied determinations and any other requests to dedicated fax number below belonging to the campus where the patient is receiving treatment   List of dedicated fax numbers for the Facilities:  [address] [place] [address] DENIALS (Administrative/Medical Necessity) [phone]   [address]  (Maternity/NICU/Pediatrics) [phone] [address] [facility] [phone]   [facility] [phone]   [facility] [phone]   Mariely Abreu [phone]   [address] [place] [address] [phone]   [facility]  [phone]   [address] [facility] [address]  [address] [address] [address] [facility] [phone]

## 2020-02-20 NOTE — ASSESSMENT & PLAN NOTE
· Present on admission as evidenced by fever, tachycardia, mild-leukocytosis (WBCs 11 48)  Suspected source of infection urine/pyelo  · Continue IV ceftriaxone  · Urine culture pending  GC chlamydia in urine pending secondary to patient complaint of vaginal discharge  · Blood cultures pending x2  Continue supportive care with IV fluids, pain control, antiemetics, and IV antibiotics

## 2020-02-20 NOTE — ASSESSMENT & PLAN NOTE
· Potassium 3 2   Replete and monitor  Check magnesium and phosphorus and replete as needed    · BMP in AM

## 2020-02-20 NOTE — PROGRESS NOTES
Progress Note - Johnathon Stern 1990, 34 y o  female MRN: [id number]    Unit/Bed#: [address] [id number] Encounter: [id number]    Primary Care Provider: Jon Kiran MD   Date and time admitted to hospital: 2/19/2020  4:24 PM        * Sepsis [facility])  Assessment & Plan  · Present on admission as evidenced by fever, tachycardia, mild-leukocytosis (WBCs 11 48)  Suspected source of infection urine/pyelo  · Continue IV ceftriaxone  · Urine culture pending  GC chlamydia in urine pending secondary to patient complaint of vaginal discharge  · Blood cultures pending x2  Continue supportive care with IV fluids, pain control, antiemetics, and IV antibiotics  Pyelonephritis of left kidney  Assessment & Plan  · Patient presented with generalized body aches, fever, left flank pain, increased urinary frequency and urgency, and suprapubic pressure  · CT revealed focal left lower pole pyelo and mild bladder wall thickening and fat stranding in keeping with cystitis   Continue IV Rocephin and follow-up urine and blood cultures  Hypokalemia  Assessment & Plan  · Potassium 3 2   Replete and monitor  Check magnesium and phosphorus and replete as needed  · BMP in AM        VTE Pharmacologic Prophylaxis:   Pharmacologic: Enoxaparin (Lovenox)  Mechanical VTE Prophylaxis in Place: No    Patient Centered Rounds: I have performed bedside rounds with nursing staff today  Discussions with Specialists or Other Care Team Provider:  Yes    Education and Discussions with Family / Patient:  Yes    Time Spent for Care: 15 minutes  More than 50% of total time spent on counseling and coordination of care as described above  Current Length of Stay: 1 day(s)    Current Patient Status: Inpatient   Certification Statement: The patient will continue to require additional inpatient hospital stay due to Pyelonephritis with sepsis    Discharge Plan:   To be determined    Code Status: Level 1 - Full Code      Subjective:   Patient complaining of right-sided flank pain    Objective:     Vitals:   Temp (24hrs), Av 4 °F (37 4 °C), Min:98 1 °F (36 7 °C), Max:101 9 °F (38 8 °C)    Temp:  [98 1 °F (36 7 °C)-101 9 °F (38 8 °C)] 98 7 °F (37 1 °C)  HR:  [] 75  Resp:  [16-18] 18  BP: (109-132)/(48-78) 124/70  SpO2:  [96 %-100 %] 99 %  Body mass index is 30 87 kg/m²  Input and Output Summary (last 24 hours): Intake/Output Summary (Last 24 hours) at 2020 1730  Last data filed at 2020 1322  Gross per 24 hour   Intake 1960 42 ml   Output 450 ml   Net 1510 42 ml       Physical Exam:     Physical Exam   Constitutional: She is oriented to person, place, and time  She appears well-developed and well-nourished  No distress  HENT:   Head: Normocephalic and atraumatic  Eyes: Pupils are equal, round, and reactive to light  EOM are normal    Neck: No JVD present  Cardiovascular: Normal rate, regular rhythm and normal heart sounds  Pulmonary/Chest: Effort normal and breath sounds normal    Abdominal: Soft  Bowel sounds are normal  There is tenderness  Right-sided flank/CVA tender   Musculoskeletal: She exhibits no edema  Neurological: She is alert and oriented to person, place, and time  Skin: Skin is warm and dry  She is not diaphoretic  Additional Data:     Labs:    Results from last 7 days   Lab Units 20  0534  20  1822   WBC Thousand/uL 10 25*  --  11 48*   HEMOGLOBIN g/dL 12 4  --  12 8   HEMATOCRIT % 37 2  --  38 6   PLATELETS Thousands/uL 156   < > 153   NEUTROS PCT %  --   --  87*   LYMPHS PCT %  --   --  7*   MONOS PCT %  --   --  6   EOS PCT %  --   --  0    < > = values in this interval not displayed       Results from last 7 days   Lab Units 20  0534 20  1822   SODIUM mmol/L 140 136   POTASSIUM mmol/L 3 2* 3 0*   CHLORIDE mmol/L 108 102   CO2 mmol/L 20* 22   BUN mg/dL 4* 8   CREATININE mg/dL 0 65 0 77   ANION GAP mmol/L 12 12   CALCIUM mg/dL 8 3 8 8   ALBUMIN g/dL  --  3 6   TOTAL BILIRUBIN mg/dL  --  0 49   ALK PHOS U/L  --  77   ALT U/L  --  19   AST U/L  --  16   GLUCOSE RANDOM mg/dL 138 135                 Results from last 7 days   Lab Units 02/20/20  0534 02/19/20 2357 02/19/20  2351 02/19/20 2011   LACTIC ACID mmol/L  --   --  0 4* 0 5   PROCALCITONIN ng/ml 0 21 0 26*  --   --            * I Have Reviewed All Lab Data Listed Above  * Additional Pertinent Lab Tests Reviewed: Gabi [address] Admission Reviewed    Imaging:    Imaging Reports Reviewed Today Include:  None available  Imaging Personally Reviewed by Myself Includes:  None    Recent Cultures (last 7 days):     Results from last 7 days   Lab Units 02/19/20 2014 02/19/20 2011   BLOOD CULTURE  Received in Microbiology Lab  Culture in Progress  Received in Microbiology Lab  Culture in Progress  Last 24 Hours Medication List:     Current Facility-Administered Medications:  acetaminophen 650 mg Oral Q6H PRN Edyta Leiva MD    cefTRIAXone 1,000 mg Intravenous Q24H Neda Canela PA-C    enoxaparin 40 mg Subcutaneous Daily Neda Canela PA-C    ketorolac 15 mg Intravenous Q6H PRN Edyta Leiva MD    sodium chloride 125 mL/hr Intravenous Continuous Zac Funez MD Last Rate: 125 mL/hr (02/20/20 1536)        Today, Patient Was Seen By: Edyta Leiva MD    ** Please Note: Dictation voice to text software may have been used in the creation of this document   **

## 2020-02-21 PROBLEM — R10.2 VAGINAL PAIN: Status: ACTIVE | Noted: 2020-02-21

## 2020-02-21 LAB
ANION GAP SERPL CALCULATED.3IONS-SCNC: 9 MMOL/L (ref 4–13)
BACTERIA UR CULT: ABNORMAL
BUN SERPL-MCNC: 4 MG/DL (ref 5–25)
C TRACH DNA SPEC QL NAA+PROBE: NEGATIVE
CALCIUM SERPL-MCNC: 8.6 MG/DL (ref 8.3–10.1)
CHLORIDE SERPL-SCNC: 108 MMOL/L (ref 100–108)
CO2 SERPL-SCNC: 23 MMOL/L (ref 21–32)
CREAT SERPL-MCNC: 0.67 MG/DL (ref 0.6–1.3)
GFR SERPL CREATININE-BSD FRML MDRD: 121 ML/MIN/1.73SQ M
GLUCOSE SERPL-MCNC: 103 MG/DL (ref 65–140)
MAGNESIUM SERPL-MCNC: 1.9 MG/DL (ref 1.6–2.6)
N GONORRHOEA DNA SPEC QL NAA+PROBE: NEGATIVE
PHOSPHATE SERPL-MCNC: 2.3 MG/DL (ref 2.7–4.5)
POTASSIUM SERPL-SCNC: 3.8 MMOL/L (ref 3.5–5.3)
SODIUM SERPL-SCNC: 140 MMOL/L (ref 136–145)

## 2020-02-21 PROCEDURE — 99232 SBSQ HOSP IP/OBS MODERATE 35: CPT | Performed by: FAMILY MEDICINE

## 2020-02-21 PROCEDURE — 80048 BASIC METABOLIC PNL TOTAL CA: CPT | Performed by: FAMILY MEDICINE

## 2020-02-21 PROCEDURE — 83735 ASSAY OF MAGNESIUM: CPT | Performed by: FAMILY MEDICINE

## 2020-02-21 PROCEDURE — 84100 ASSAY OF PHOSPHORUS: CPT | Performed by: FAMILY MEDICINE

## 2020-02-21 RX ORDER — BUTALBITAL, ACETAMINOPHEN AND CAFFEINE 50; 325; 40 MG/1; MG/1; MG/1
1 TABLET ORAL EVERY 4 HOURS PRN
Status: DISCONTINUED | OUTPATIENT
Start: 2020-02-21 | End: 2020-02-22 | Stop reason: HOSPADM

## 2020-02-21 RX ADMIN — SODIUM CHLORIDE 125 ML/HR: 0.9 INJECTION, SOLUTION INTRAVENOUS at 10:08

## 2020-02-21 RX ADMIN — ENOXAPARIN SODIUM 40 MG: 40 INJECTION SUBCUTANEOUS at 08:35

## 2020-02-21 RX ADMIN — KETOROLAC TROMETHAMINE 15 MG: 30 INJECTION, SOLUTION INTRAMUSCULAR at 21:21

## 2020-02-21 RX ADMIN — SODIUM CHLORIDE 125 ML/HR: 0.9 INJECTION, SOLUTION INTRAVENOUS at 21:15

## 2020-02-21 RX ADMIN — BUTALBITAL, ACETAMINOPHEN, AND CAFFEINE 1 TABLET: 50; 325; 40 TABLET ORAL at 10:08

## 2020-02-21 RX ADMIN — CEFTRIAXONE 1000 MG: 1 INJECTION, POWDER, FOR SOLUTION INTRAMUSCULAR; INTRAVENOUS at 21:16

## 2020-02-21 RX ADMIN — POTASSIUM PHOSPHATE, MONOBASIC AND POTASSIUM PHOSPHATE, DIBASIC 9 MMOL: 224; 236 INJECTION, SOLUTION INTRAVENOUS at 10:08

## 2020-02-21 RX ADMIN — KETOROLAC TROMETHAMINE 15 MG: 30 INJECTION, SOLUTION INTRAMUSCULAR at 07:37

## 2020-02-21 NOTE — SOCIAL WORK
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

## 2020-02-21 NOTE — ASSESSMENT & PLAN NOTE
· Patient presented with generalized body aches, fever, left flank pain, increased urinary frequency and urgency, and suprapubic pressure  · CT revealed focal left lower pole pyelo and mild bladder wall thickening and fat stranding in keeping with cystitis   Continue IV Rocephin and follow-up urine and blood cultures  Urine cultures growing E coli  Blood cultures negative  Possible transition to oral antibiotics tomorrow 2/22

## 2020-02-21 NOTE — ASSESSMENT & PLAN NOTE
· Present on admission as evidenced by fever, tachycardia, mild-leukocytosis (WBCs 11 48)  Suspected source of infection urine/pyelo  · Continue IV ceftriaxone  · Urine culture growing E coli  Blood cultures negative for growth at 24 hrs  · GC chlamydia negative  Continue supportive care with IV fluids, pain control, antiemetics, and IV antibiotics  If patient remains stable, possible transition to oral antibiotics and discharge to home tomorrow 2/22

## 2020-02-21 NOTE — PROGRESS NOTES
Patient complaining of worsening pain in "bump" on groin area  Asking for cream for bump  Dr Myriam Silva made aware  Awaiting new orders      Vaibhav Figueroa RN 2/21/2020 2:26 PM

## 2020-02-21 NOTE — ASSESSMENT & PLAN NOTE
Patient complaining of " bump" in her groin  She denies any vaginal discharge  Will consult Gynecology for further evaluation

## 2020-02-21 NOTE — PLAN OF CARE
Problem: METABOLIC, FLUID AND ELECTROLYTES - ADULT  Goal: Electrolytes maintained within normal limits  Description  INTERVENTIONS:  - Monitor labs and assess patient for signs and symptoms of electrolyte imbalances  - Administer electrolyte replacement as ordered  - Monitor response to electrolyte replacements, including repeat lab results as appropriate  - Instruct patient on fluid and nutrition as appropriate  Outcome: Progressing  Goal: Fluid balance maintained  Description  INTERVENTIONS:  - Monitor labs   - Monitor I/O and WT  - Instruct patient on fluid and nutrition as appropriate  - Assess for signs & symptoms of volume excess or deficit  Outcome: Progressing  Goal: Glucose maintained within target range  Description  INTERVENTIONS:  - Monitor Blood Glucose as ordered  - Assess for signs and symptoms of hyperglycemia and hypoglycemia  - Administer ordered medications to maintain glucose within target range  - Assess nutritional intake and initiate nutrition service referral as needed  Outcome: Progressing     Problem: PAIN - ADULT  Goal: Verbalizes/displays adequate comfort level or baseline comfort level  Description  Interventions:  - Encourage patient to monitor pain and request assistance  - Assess pain using appropriate pain scale  - Administer analgesics based on type and severity of pain and evaluate response  - Implement non-pharmacological measures as appropriate and evaluate response  - Consider cultural and social influences on pain and pain management  - Notify physician/advanced practitioner if interventions unsuccessful or patient reports new pain  Outcome: Progressing     Problem: INFECTION - ADULT  Goal: Absence or prevention of progression during hospitalization  Description  INTERVENTIONS:  - Assess and monitor for signs and symptoms of infection  - Monitor lab/diagnostic results  - Monitor all insertion sites, i e  indwelling lines, tubes, and drains  - Monitor endotracheal if appropriate and nasal secretions for changes in amount and color  - [place] appropriate cooling/warming therapies per order  - Administer medications as ordered  - Instruct and encourage patient and family to use good hand hygiene technique  - Identify and instruct in appropriate isolation precautions for identified infection/condition  Outcome: Progressing  Goal: Absence of fever/infection during neutropenic period  Description  INTERVENTIONS:  - Monitor WBC    Outcome: Progressing     Problem: SAFETY ADULT  Goal: Patient will remain free of falls  Description  INTERVENTIONS:  - Assess patient frequently for physical needs  -  Identify cognitive and physical deficits and behaviors that affect risk of falls    -  [place] fall precautions as indicated by assessment   - Educate patient/family on patient safety including physical limitations  - Instruct patient to call for assistance with activity based on assessment  - Modify environment to reduce risk of injury  - Consider OT/PT consult to assist with strengthening/mobility  Outcome: Progressing  Goal: Maintain or return to baseline ADL function  Description  INTERVENTIONS:  -  Assess patient's ability to carry out ADLs; assess patient's baseline for ADL function and identify physical deficits which impact ability to perform ADLs (bathing, care of mouth/teeth, toileting, grooming, dressing, etc )  - Assess/evaluate cause of self-care deficits   - Assess range of motion  - Assess patient's mobility; develop plan if impaired  - Assess patient's need for assistive devices and provide as appropriate  - Encourage maximum independence but intervene and supervise when necessary  - Involve family in performance of ADLs  - Assess for home care needs following discharge   - Consider OT consult to assist with ADL evaluation and planning for discharge  - Provide patient education as appropriate  Outcome: Progressing  Goal: Maintain or return mobility status to optimal level  Description  INTERVENTIONS:  - Assess patient's baseline mobility status (ambulation, transfers, stairs, etc )    - Identify cognitive and physical deficits and behaviors that affect mobility  - Identify mobility aids required to assist with transfers and/or ambulation (gait belt, sit-to-stand, lift, walker, cane, etc )  - [place] fall precautions as indicated by assessment  - Record patient progress and toleration of activity level on Mobility SBAR; progress patient to next Phase/Stage  - Instruct patient to call for assistance with activity based on assessment  - Consider rehabilitation consult to assist with strengthening/weightbearing, etc   Outcome: Progressing     Problem: DISCHARGE PLANNING  Goal: Discharge to home or other facility with appropriate resources  Description  INTERVENTIONS:  - Identify barriers to discharge w/patient and caregiver  - Arrange for needed discharge resources and transportation as appropriate  - Identify discharge learning needs (meds, wound care, etc )  - Arrange for interpretive services to assist at discharge as needed  - Refer to Case Management Department for coordinating discharge planning if the patient needs post-hospital services based on physician/advanced practitioner order or complex needs related to functional status, cognitive ability, or social support system  Outcome: Progressing     Problem: Potential for Falls  Goal: Patient will remain free of falls  Description  INTERVENTIONS:  - Assess patient frequently for physical needs  -  Identify cognitive and physical deficits and behaviors that affect risk of falls    -  [place] fall precautions as indicated by assessment   - Educate patient/family on patient safety including physical limitations  - Instruct patient to call for assistance with activity based on assessment  - Modify environment to reduce risk of injury  - Consider OT/PT consult to assist with strengthening/mobility  Outcome: Progressing Problem: Knowledge Deficit  Goal: Patient/family/caregiver demonstrates understanding of disease process, treatment plan, medications, and discharge instructions  Description  Complete learning assessment and assess knowledge base    Interventions:  - Provide teaching at level of understanding  - Provide teaching via preferred learning methods  Outcome: Progressing     Problem: GENITOURINARY - ADULT  Goal: Maintains or returns to baseline urinary function  Description  INTERVENTIONS:  - Assess urinary function  - Encourage oral fluids to ensure adequate hydration if ordered  - Administer IV fluids as ordered to ensure adequate hydration  - Administer ordered medications as needed  - Offer frequent toileting  - Follow urinary retention protocol if ordered  Outcome: Progressing  Goal: Absence of urinary retention  Description  INTERVENTIONS:  - Assess patients ability to void and empty bladder  - Monitor I/O  - Bladder scan as needed  - Discuss with physician/AP medications to alleviate retention as needed  - Discuss catheterization for long term situations as appropriate  Outcome: Progressing  Goal: Urinary catheter remains patent  Description  INTERVENTIONS:  - Assess patency of urinary catheter  - If patient has a chronic anaya, consider changing catheter if non-functioning  - Follow guidelines for intermittent irrigation of non-functioning urinary catheter  Outcome: Progressing

## 2020-02-21 NOTE — PLAN OF CARE
Problem: METABOLIC, FLUID AND ELECTROLYTES - ADULT  Goal: Electrolytes maintained within normal limits  Description  INTERVENTIONS:  - Monitor labs and assess patient for signs and symptoms of electrolyte imbalances  - Administer electrolyte replacement as ordered  - Monitor response to electrolyte replacements, including repeat lab results as appropriate  - Instruct patient on fluid and nutrition as appropriate  Outcome: Progressing  Goal: Fluid balance maintained  Description  INTERVENTIONS:  - Monitor labs   - Monitor I/O and WT  - Instruct patient on fluid and nutrition as appropriate  - Assess for signs & symptoms of volume excess or deficit  Outcome: Progressing  Goal: Glucose maintained within target range  Description  INTERVENTIONS:  - Monitor Blood Glucose as ordered  - Assess for signs and symptoms of hyperglycemia and hypoglycemia  - Administer ordered medications to maintain glucose within target range  - Assess nutritional intake and initiate nutrition service referral as needed  Outcome: Progressing     Problem: PAIN - ADULT  Goal: Verbalizes/displays adequate comfort level or baseline comfort level  Description  Interventions:  - Encourage patient to monitor pain and request assistance  - Assess pain using appropriate pain scale  - Administer analgesics based on type and severity of pain and evaluate response  - Implement non-pharmacological measures as appropriate and evaluate response  - Consider cultural and social influences on pain and pain management  - Notify physician/advanced practitioner if interventions unsuccessful or patient reports new pain  Outcome: Progressing     Problem: INFECTION - ADULT  Goal: Absence or prevention of progression during hospitalization  Description  INTERVENTIONS:  - Assess and monitor for signs and symptoms of infection  - Monitor lab/diagnostic results  - Monitor all insertion sites, i e  indwelling lines, tubes, and drains  - Monitor endotracheal if appropriate and nasal secretions for changes in amount and color  - [place] appropriate cooling/warming therapies per order  - Administer medications as ordered  - Instruct and encourage patient and family to use good hand hygiene technique  - Identify and instruct in appropriate isolation precautions for identified infection/condition  Outcome: Progressing  Goal: Absence of fever/infection during neutropenic period  Description  INTERVENTIONS:  - Monitor WBC    Outcome: Progressing     Problem: SAFETY ADULT  Goal: Patient will remain free of falls  Description  INTERVENTIONS:  - Assess patient frequently for physical needs  -  Identify cognitive and physical deficits and behaviors that affect risk of falls    -  [place] fall precautions as indicated by assessment   - Educate patient/family on patient safety including physical limitations  - Instruct patient to call for assistance with activity based on assessment  - Modify environment to reduce risk of injury  - Consider OT/PT consult to assist with strengthening/mobility  Outcome: Progressing  Goal: Maintain or return to baseline ADL function  Description  INTERVENTIONS:  -  Assess patient's ability to carry out ADLs; assess patient's baseline for ADL function and identify physical deficits which impact ability to perform ADLs (bathing, care of mouth/teeth, toileting, grooming, dressing, etc )  - Assess/evaluate cause of self-care deficits   - Assess range of motion  - Assess patient's mobility; develop plan if impaired  - Assess patient's need for assistive devices and provide as appropriate  - Encourage maximum independence but intervene and supervise when necessary  - Involve family in performance of ADLs  - Assess for home care needs following discharge   - Consider OT consult to assist with ADL evaluation and planning for discharge  - Provide patient education as appropriate  Outcome: Progressing  Goal: Maintain or return mobility status to optimal level  Description  INTERVENTIONS:  - Assess patient's baseline mobility status (ambulation, transfers, stairs, etc )    - Identify cognitive and physical deficits and behaviors that affect mobility  - Identify mobility aids required to assist with transfers and/or ambulation (gait belt, sit-to-stand, lift, walker, cane, etc )  - [place] fall precautions as indicated by assessment  - Record patient progress and toleration of activity level on Mobility SBAR; progress patient to next Phase/Stage  - Instruct patient to call for assistance with activity based on assessment  - Consider rehabilitation consult to assist with strengthening/weightbearing, etc   Outcome: Progressing     Problem: DISCHARGE PLANNING  Goal: Discharge to home or other facility with appropriate resources  Description  INTERVENTIONS:  - Identify barriers to discharge w/patient and caregiver  - Arrange for needed discharge resources and transportation as appropriate  - Identify discharge learning needs (meds, wound care, etc )  - Arrange for interpretive services to assist at discharge as needed  - Refer to Case Management Department for coordinating discharge planning if the patient needs post-hospital services based on physician/advanced practitioner order or complex needs related to functional status, cognitive ability, or social support system  Outcome: Progressing     Problem: Potential for Falls  Goal: Patient will remain free of falls  Description  INTERVENTIONS:  - Assess patient frequently for physical needs  -  Identify cognitive and physical deficits and behaviors that affect risk of falls    -  [place] fall precautions as indicated by assessment   - Educate patient/family on patient safety including physical limitations  - Instruct patient to call for assistance with activity based on assessment  - Modify environment to reduce risk of injury  - Consider OT/PT consult to assist with strengthening/mobility  Outcome: Progressing Problem: Knowledge Deficit  Goal: Patient/family/caregiver demonstrates understanding of disease process, treatment plan, medications, and discharge instructions  Description  Complete learning assessment and assess knowledge base    Interventions:  - Provide teaching at level of understanding  - Provide teaching via preferred learning methods  Outcome: Progressing     Problem: GENITOURINARY - ADULT  Goal: Maintains or returns to baseline urinary function  Description  INTERVENTIONS:  - Assess urinary function  - Encourage oral fluids to ensure adequate hydration if ordered  - Administer IV fluids as ordered to ensure adequate hydration  - Administer ordered medications as needed  - Offer frequent toileting  - Follow urinary retention protocol if ordered  Outcome: Progressing  Goal: Absence of urinary retention  Description  INTERVENTIONS:  - Assess patients ability to void and empty bladder  - Monitor I/O  - Bladder scan as needed  - Discuss with physician/AP medications to alleviate retention as needed  - Discuss catheterization for long term situations as appropriate  Outcome: Progressing  Goal: Urinary catheter remains patent  Description  INTERVENTIONS:  - Assess patency of urinary catheter  - If patient has a chronic anaya, consider changing catheter if non-functioning  - Follow guidelines for intermittent irrigation of non-functioning urinary catheter  Outcome: Progressing

## 2020-02-21 NOTE — PROGRESS NOTES
Progress Note - Ty Bowen 1990, 34 y o  female MRN: [id number]    Unit/Bed#: [facility] 2 [facility] 87 [id number] Encounter: [id number]    Primary Care Provider: Guido Burk MD   Date and time admitted to hospital: 2/19/2020  4:24 PM        * Sepsis [facility])  Assessment & Plan  · Present on admission as evidenced by fever, tachycardia, mild-leukocytosis (WBCs 11 48)  Suspected source of infection urine/pyelo  · Continue IV ceftriaxone  · Urine culture growing E coli  Blood cultures negative for growth at 24 hrs  · GC chlamydia negative  Continue supportive care with IV fluids, pain control, antiemetics, and IV antibiotics  If patient remains stable, possible transition to oral antibiotics and discharge to home tomorrow 2/22  Pyelonephritis of left kidney  Assessment & Plan  · Patient presented with generalized body aches, fever, left flank pain, increased urinary frequency and urgency, and suprapubic pressure  · CT revealed focal left lower pole pyelo and mild bladder wall thickening and fat stranding in keeping with cystitis   Continue IV Rocephin and follow-up urine and blood cultures  Urine cultures growing E coli  Blood cultures negative  Possible transition to oral antibiotics tomorrow 2/22  Hypokalemia  Assessment & Plan  Resolved with K 3 8  Vaginal pain  Assessment & Plan  Patient complaining of " bump" in her groin  She denies any vaginal discharge  Will consult Gynecology for further evaluation  VTE Pharmacologic Prophylaxis:   Pharmacologic: Enoxaparin (Lovenox)  Mechanical VTE Prophylaxis in Place: No    Patient Centered Rounds: I have performed bedside rounds with nursing staff today  Discussions with Specialists or Other Care Team Provider:  Yes    Education and Discussions with Family / Patient:  Yes    Time Spent for Care: 15 minutes  More than 50% of total time spent on counseling and coordination of care as described above      Current Length of Stay: 2 day(s)    Current Patient Status: Inpatient   Certification Statement: The patient will continue to require additional inpatient hospital stay due to Acute pyelonephritis    Discharge Plan:  Possible discharge to home tomorrow    Code Status: Level 1 - Full Code      Subjective:   Patient complaining of a bump in her groin/angina  She denies any vaginal discharge  Objective:     Vitals:   Temp (24hrs), Av 6 °F (37 °C), Min:98 2 °F (36 8 °C), Max:98 9 °F (37 2 °C)    Temp:  [98 2 °F (36 8 °C)-98 9 °F (37 2 °C)] 98 2 °F (36 8 °C)  HR:  [66-80] 66  Resp:  [17-18] 18  BP: ()/(63-67) 116/67  SpO2:  [96 %-99 %] 98 %  Body mass index is 30 87 kg/m²  Input and Output Summary (last 24 hours): Intake/Output Summary (Last 24 hours) at 2020 1624  Last data filed at 2020 0601  Gross per 24 hour   Intake --   Output 300 ml   Net -300 ml       Physical Exam:     Physical Exam   Constitutional: She is oriented to person, place, and time  She appears well-developed and well-nourished  No distress  HENT:   Head: Normocephalic and atraumatic  Eyes: Pupils are equal, round, and reactive to light  EOM are normal    Neck: Normal range of motion  Neck supple  Cardiovascular: Normal rate, regular rhythm and normal heart sounds  Pulmonary/Chest: Effort normal and breath sounds normal    Abdominal: Soft  Bowel sounds are normal    Musculoskeletal: She exhibits no edema  Neurological: She is alert and oriented to person, place, and time  Skin: She is not diaphoretic  Additional Data:     Labs:    Results from last 7 days   Lab Units 20  0534  20  1822   WBC Thousand/uL 10 25*  --  11 48*   HEMOGLOBIN g/dL 12 4  --  12 8   HEMATOCRIT % 37 2  --  38 6   PLATELETS Thousands/uL 156   < > 153   NEUTROS PCT %  --   --  87*   LYMPHS PCT %  --   --  7*   MONOS PCT %  --   --  6   EOS PCT %  --   --  0    < > = values in this interval not displayed       Results from last 7 days   Lab Units 20  0555 02/19/20  1822   SODIUM mmol/L 140   < > 136   POTASSIUM mmol/L 3 8   < > 3 0*   CHLORIDE mmol/L 108   < > 102   CO2 mmol/L 23   < > 22   BUN mg/dL 4*   < > 8   CREATININE mg/dL 0 67   < > 0 77   ANION GAP mmol/L 9   < > 12   CALCIUM mg/dL 8 6   < > 8 8   ALBUMIN g/dL  --   --  3 6   TOTAL BILIRUBIN mg/dL  --   --  0 49   ALK PHOS U/L  --   --  77   ALT U/L  --   --  19   AST U/L  --   --  16   GLUCOSE RANDOM mg/dL 103   < > 135    < > = values in this interval not displayed  Results from last 7 days   Lab Units 02/20/20  0534 02/19/20 2357 02/19/20 2351 02/19/20 2011   LACTIC ACID mmol/L  --   --  0 4* 0 5   PROCALCITONIN ng/ml 0 21 0 26*  --   --            * I Have Reviewed All Lab Data Listed Above  * Additional Pertinent Lab Tests Reviewed: Gabi 66 Admission Reviewed    Imaging:    Imaging Reports Reviewed Today Include:  None available  Imaging Personally Reviewed by Myself Includes:  None    Recent Cultures (last 7 days):     Results from last 7 days   Lab Units 02/19/20 2014 02/19/20 2011 02/19/20  1739   BLOOD CULTURE  No Growth at 24 hrs  No Growth at 24 hrs   --    URINE CULTURE   --   --  60,000-69,000 cfu/ml Escherichia coli*       Last 24 Hours Medication List:     Current Facility-Administered Medications:  acetaminophen 650 mg Oral Q6H PRN Norberto Sandoval MD    butalbital-acetaminophen-caffeine 1 tablet Oral Q4H PRN Norberto Sandoval MD    cefTRIAXone 1,000 mg Intravenous Q24H aMry Ramírez PA-C Last Rate: 1,000 mg (02/20/20 2100)   enoxaparin 40 mg Subcutaneous Daily Mary Ramírez PA-C    ketorolac 15 mg Intravenous Q6H PRN Norberto Sandoval MD    sodium chloride 125 mL/hr Intravenous Continuous Padmini Mendez MD Last Rate: 125 mL/hr (02/21/20 1008)        Today, Patient Was Seen By: Norberto Sandoval MD    ** Please Note: Dictation voice to text software may have been used in the creation of this document   **

## 2020-02-22 VITALS
RESPIRATION RATE: 16 BRPM | DIASTOLIC BLOOD PRESSURE: 65 MMHG | WEIGHT: 163.36 LBS | BODY MASS INDEX: 30.87 KG/M2 | SYSTOLIC BLOOD PRESSURE: 114 MMHG | OXYGEN SATURATION: 95 % | HEART RATE: 65 BPM | TEMPERATURE: 98.6 F

## 2020-02-22 LAB
ANION GAP SERPL CALCULATED.3IONS-SCNC: 11 MMOL/L (ref 4–13)
BASOPHILS # BLD AUTO: 0.02 THOUSANDS/ΜL (ref 0–0.1)
BASOPHILS NFR BLD AUTO: 0 % (ref 0–1)
BUN SERPL-MCNC: 5 MG/DL (ref 5–25)
CALCIUM SERPL-MCNC: 8.4 MG/DL (ref 8.3–10.1)
CHLORIDE SERPL-SCNC: 109 MMOL/L (ref 100–108)
CO2 SERPL-SCNC: 21 MMOL/L (ref 21–32)
CREAT SERPL-MCNC: 0.59 MG/DL (ref 0.6–1.3)
EOSINOPHIL # BLD AUTO: 0.04 THOUSAND/ΜL (ref 0–0.61)
EOSINOPHIL NFR BLD AUTO: 1 % (ref 0–6)
ERYTHROCYTE [DISTWIDTH] IN BLOOD BY AUTOMATED COUNT: 12.9 % (ref 11.6–15.1)
GFR SERPL CREATININE-BSD FRML MDRD: 126 ML/MIN/1.73SQ M
GLUCOSE SERPL-MCNC: 95 MG/DL (ref 65–140)
HCT VFR BLD AUTO: 34.9 % (ref 34.8–46.1)
HGB BLD-MCNC: 11.3 G/DL (ref 11.5–15.4)
IMM GRANULOCYTES # BLD AUTO: 0.02 THOUSAND/UL (ref 0–0.2)
IMM GRANULOCYTES NFR BLD AUTO: 0 % (ref 0–2)
LYMPHOCYTES # BLD AUTO: 1.92 THOUSANDS/ΜL (ref 0.6–4.47)
LYMPHOCYTES NFR BLD AUTO: 31 % (ref 14–44)
MAGNESIUM SERPL-MCNC: 1.8 MG/DL (ref 1.6–2.6)
MCH RBC QN AUTO: 29.6 PG (ref 26.8–34.3)
MCHC RBC AUTO-ENTMCNC: 32.4 G/DL (ref 31.4–37.4)
MCV RBC AUTO: 91 FL (ref 82–98)
MONOCYTES # BLD AUTO: 0.6 THOUSAND/ΜL (ref 0.17–1.22)
MONOCYTES NFR BLD AUTO: 10 % (ref 4–12)
NEUTROPHILS # BLD AUTO: 3.59 THOUSANDS/ΜL (ref 1.85–7.62)
NEUTS SEG NFR BLD AUTO: 58 % (ref 43–75)
NRBC BLD AUTO-RTO: 0 /100 WBCS
PHOSPHATE SERPL-MCNC: 2.9 MG/DL (ref 2.7–4.5)
PLATELET # BLD AUTO: 149 THOUSANDS/UL (ref 149–390)
PMV BLD AUTO: 11.5 FL (ref 8.9–12.7)
POTASSIUM SERPL-SCNC: 3.8 MMOL/L (ref 3.5–5.3)
RBC # BLD AUTO: 3.82 MILLION/UL (ref 3.81–5.12)
SODIUM SERPL-SCNC: 141 MMOL/L (ref 136–145)
WBC # BLD AUTO: 6.19 THOUSAND/UL (ref 4.31–10.16)

## 2020-02-22 PROCEDURE — 85025 COMPLETE CBC W/AUTO DIFF WBC: CPT | Performed by: FAMILY MEDICINE

## 2020-02-22 PROCEDURE — 80048 BASIC METABOLIC PNL TOTAL CA: CPT | Performed by: FAMILY MEDICINE

## 2020-02-22 PROCEDURE — 83735 ASSAY OF MAGNESIUM: CPT | Performed by: FAMILY MEDICINE

## 2020-02-22 PROCEDURE — 99238 HOSP IP/OBS DSCHRG MGMT 30/<: CPT | Performed by: FAMILY MEDICINE

## 2020-02-22 PROCEDURE — 84100 ASSAY OF PHOSPHORUS: CPT | Performed by: FAMILY MEDICINE

## 2020-02-22 RX ORDER — METRONIDAZOLE 500 MG/1
500 TABLET ORAL EVERY 12 HOURS SCHEDULED
Qty: 14 TABLET | Refills: 0 | Status: SHIPPED | OUTPATIENT
Start: 2020-02-22 | End: 2020-02-29

## 2020-02-22 RX ORDER — IBUPROFEN 400 MG/1
400 TABLET ORAL EVERY 6 HOURS PRN
Qty: 20 TABLET | Refills: 0 | Status: SHIPPED | OUTPATIENT
Start: 2020-02-22 | End: 2020-04-29

## 2020-02-22 RX ORDER — OXYCODONE HYDROCHLORIDE AND ACETAMINOPHEN 5; 325 MG/1; MG/1
1 TABLET ORAL EVERY 4 HOURS PRN
Status: DISCONTINUED | OUTPATIENT
Start: 2020-02-22 | End: 2020-02-22 | Stop reason: HOSPADM

## 2020-02-22 RX ORDER — METRONIDAZOLE 500 MG/1
500 TABLET ORAL EVERY 12 HOURS SCHEDULED
Status: DISCONTINUED | OUTPATIENT
Start: 2020-02-22 | End: 2020-02-22 | Stop reason: HOSPADM

## 2020-02-22 RX ORDER — AMOXICILLIN AND CLAVULANATE POTASSIUM 500; 125 MG/1; MG/1
1 TABLET, FILM COATED ORAL EVERY 12 HOURS SCHEDULED
Qty: 10 TABLET | Refills: 0 | Status: SHIPPED | OUTPATIENT
Start: 2020-02-22 | End: 2020-02-27

## 2020-02-22 RX ORDER — IBUPROFEN 400 MG/1
400 TABLET ORAL EVERY 6 HOURS PRN
Status: DISCONTINUED | OUTPATIENT
Start: 2020-02-22 | End: 2020-02-22 | Stop reason: HOSPADM

## 2020-02-22 RX ORDER — FLUCONAZOLE 200 MG/1
200 TABLET ORAL ONCE
Qty: 1 TABLET | Refills: 0 | Status: SHIPPED | OUTPATIENT
Start: 2020-02-22 | End: 2020-02-22

## 2020-02-22 RX ORDER — FLUCONAZOLE 100 MG/1
200 TABLET ORAL ONCE
Status: COMPLETED | OUTPATIENT
Start: 2020-02-22 | End: 2020-02-22

## 2020-02-22 RX ORDER — LIDOCAINE HYDROCHLORIDE 20 MG/ML
1 JELLY TOPICAL DAILY PRN
Qty: 1 SYRINGE | Refills: 1 | Status: SHIPPED | OUTPATIENT
Start: 2020-02-22 | End: 2020-04-29

## 2020-02-22 RX ORDER — OXYCODONE HYDROCHLORIDE AND ACETAMINOPHEN 5; 325 MG/1; MG/1
1 TABLET ORAL EVERY 4 HOURS PRN
Qty: 10 TABLET | Refills: 0 | Status: SHIPPED | OUTPATIENT
Start: 2020-02-22 | End: 2020-03-03

## 2020-02-22 RX ORDER — LIDOCAINE HYDROCHLORIDE 20 MG/ML
1 JELLY TOPICAL ONCE
Status: DISCONTINUED | OUTPATIENT
Start: 2020-02-22 | End: 2020-02-22 | Stop reason: HOSPADM

## 2020-02-22 RX ORDER — AMOXICILLIN AND CLAVULANATE POTASSIUM 500; 125 MG/1; MG/1
1 TABLET, FILM COATED ORAL EVERY 12 HOURS SCHEDULED
Status: DISCONTINUED | OUTPATIENT
Start: 2020-02-22 | End: 2020-02-22 | Stop reason: HOSPADM

## 2020-02-22 RX ADMIN — ENOXAPARIN SODIUM 40 MG: 40 INJECTION SUBCUTANEOUS at 08:28

## 2020-02-22 RX ADMIN — FLUCONAZOLE 200 MG: 100 TABLET ORAL at 11:26

## 2020-02-22 RX ADMIN — METRONIDAZOLE 500 MG: 500 TABLET ORAL at 11:26

## 2020-02-22 RX ADMIN — AMOXICILLIN AND CLAVULANATE POTASSIUM 1 TABLET: 500; 125 TABLET, FILM COATED ORAL at 11:26

## 2020-02-22 NOTE — PLAN OF CARE
Problem: METABOLIC, FLUID AND ELECTROLYTES - ADULT  Goal: Electrolytes maintained within normal limits  Description  INTERVENTIONS:  - Monitor labs and assess patient for signs and symptoms of electrolyte imbalances  - Administer electrolyte replacement as ordered  - Monitor response to electrolyte replacements, including repeat lab results as appropriate  - Instruct patient on fluid and nutrition as appropriate  2/22/2020 1114 by Josette Arevalo RN  Outcome: Adequate for Discharge  2/22/2020 0735 by Josette Arevalo RN  Outcome: Progressing  Goal: Fluid balance maintained  Description  INTERVENTIONS:  - Monitor labs   - Monitor I/O and WT  - Instruct patient on fluid and nutrition as appropriate  - Assess for signs & symptoms of volume excess or deficit  2/22/2020 1114 by Josette Arevalo RN  Outcome: Adequate for Discharge  2/22/2020 0735 by Josette Arevalo RN  Outcome: Progressing  Goal: Glucose maintained within target range  Description  INTERVENTIONS:  - Monitor Blood Glucose as ordered  - Assess for signs and symptoms of hyperglycemia and hypoglycemia  - Administer ordered medications to maintain glucose within target range  - Assess nutritional intake and initiate nutrition service referral as needed  2/22/2020 1114 by Josette Arevalo RN  Outcome: Adequate for Discharge  2/22/2020 0735 by Josette Arevalo RN  Outcome: Progressing     Problem: PAIN - ADULT  Goal: Verbalizes/displays adequate comfort level or baseline comfort level  Description  Interventions:  - Encourage patient to monitor pain and request assistance  - Assess pain using appropriate pain scale  - Administer analgesics based on type and severity of pain and evaluate response  - Implement non-pharmacological measures as appropriate and evaluate response  - Consider cultural and social influences on pain and pain management  - Notify physician/advanced practitioner if interventions unsuccessful or patient reports new pain  2/22/2020 1114 by Josette Arevalo RN  Outcome: Adequate for Discharge  2/22/2020 0735 by Sarah Riley RN  Outcome: Progressing     Problem: INFECTION - ADULT  Goal: Absence or prevention of progression during hospitalization  Description  INTERVENTIONS:  - Assess and monitor for signs and symptoms of infection  - Monitor lab/diagnostic results  - Monitor all insertion sites, i e  indwelling lines, tubes, and drains  - Monitor endotracheal if appropriate and nasal secretions for changes in amount and color  - [place] appropriate cooling/warming therapies per order  - Administer medications as ordered  - Instruct and encourage patient and family to use good hand hygiene technique  - Identify and instruct in appropriate isolation precautions for identified infection/condition  2/22/2020 1114 by Sarah Riley RN  Outcome: Adequate for Discharge  2/22/2020 0735 by Sarah Riley RN  Outcome: Progressing  Goal: Absence of fever/infection during neutropenic period  Description  INTERVENTIONS:  - Monitor WBC    2/22/2020 1114 by Sarah Riley RN  Outcome: Adequate for Discharge  2/22/2020 0735 by Sraah Riley RN  Outcome: Progressing     Problem: SAFETY ADULT  Goal: Patient will remain free of falls  Description  INTERVENTIONS:  - Assess patient frequently for physical needs  -  Identify cognitive and physical deficits and behaviors that affect risk of falls    -  [place] fall precautions as indicated by assessment   - Educate patient/family on patient safety including physical limitations  - Instruct patient to call for assistance with activity based on assessment  - Modify environment to reduce risk of injury  - Consider OT/PT consult to assist with strengthening/mobility  2/22/2020 1114 by Sarah Riley RN  Outcome: Adequate for Discharge  2/22/2020 0735 by Sarah Riley RN  Outcome: Progressing  Goal: Maintain or return to baseline ADL function  Description  INTERVENTIONS:  -  Assess patient's ability to carry out ADLs; assess patient's baseline for ADL function and identify physical deficits which impact ability to perform ADLs (bathing, care of mouth/teeth, toileting, grooming, dressing, etc )  - Assess/evaluate cause of self-care deficits   - Assess range of motion  - Assess patient's mobility; develop plan if impaired  - Assess patient's need for assistive devices and provide as appropriate  - Encourage maximum independence but intervene and supervise when necessary  - Involve family in performance of ADLs  - Assess for home care needs following discharge   - Consider OT consult to assist with ADL evaluation and planning for discharge  - Provide patient education as appropriate  2/22/2020 1114 by Sumeet Murillo RN  Outcome: Adequate for Discharge  2/22/2020 0735 by Sumeet Murillo RN  Outcome: Progressing  Goal: Maintain or return mobility status to optimal level  Description  INTERVENTIONS:  - Assess patient's baseline mobility status (ambulation, transfers, stairs, etc )    - Identify cognitive and physical deficits and behaviors that affect mobility  - Identify mobility aids required to assist with transfers and/or ambulation (gait belt, sit-to-stand, lift, walker, cane, etc )  - [place] fall precautions as indicated by assessment  - Record patient progress and toleration of activity level on Mobility SBAR; progress patient to next Phase/Stage  - Instruct patient to call for assistance with activity based on assessment  - Consider rehabilitation consult to assist with strengthening/weightbearing, etc   2/22/2020 1114 by Sumeet Murillo RN  Outcome: Adequate for Discharge  2/22/2020 0735 by Sumeet Murillo RN  Outcome: Progressing     Problem: DISCHARGE PLANNING  Goal: Discharge to home or other facility with appropriate resources  Description  INTERVENTIONS:  - Identify barriers to discharge w/patient and caregiver  - Arrange for needed discharge resources and transportation as appropriate  - Identify discharge learning needs (meds, wound care, etc )  - Arrange for interpretive services to assist at discharge as needed  - Refer to Case Management Department for coordinating discharge planning if the patient needs post-hospital services based on physician/advanced practitioner order or complex needs related to functional status, cognitive ability, or social support system  2/22/2020 1114 by Víctor Yu RN  Outcome: Adequate for Discharge  2/22/2020 0735 by Víctor Yu RN  Outcome: Progressing     Problem: Potential for Falls  Goal: Patient will remain free of falls  Description  INTERVENTIONS:  - Assess patient frequently for physical needs  -  Identify cognitive and physical deficits and behaviors that affect risk of falls  -  [place] fall precautions as indicated by assessment   - Educate patient/family on patient safety including physical limitations  - Instruct patient to call for assistance with activity based on assessment  - Modify environment to reduce risk of injury  - Consider OT/PT consult to assist with strengthening/mobility  2/22/2020 1114 by Víctor Yu RN  Outcome: Adequate for Discharge  2/22/2020 0735 by Víctor Yu RN  Outcome: Progressing     Problem: Knowledge Deficit  Goal: Patient/family/caregiver demonstrates understanding of disease process, treatment plan, medications, and discharge instructions  Description  Complete learning assessment and assess knowledge base    Interventions:  - Provide teaching at level of understanding  - Provide teaching via preferred learning methods  2/22/2020 1114 by Víctor Yu RN  Outcome: Adequate for Discharge  2/22/2020 0735 by Víctor Yu RN  Outcome: Progressing     Problem: GENITOURINARY - ADULT  Goal: Maintains or returns to baseline urinary function  Description  INTERVENTIONS:  - Assess urinary function  - Encourage oral fluids to ensure adequate hydration if ordered  - Administer IV fluids as ordered to ensure adequate hydration  - Administer ordered medications as needed  - Offer frequent toileting  - Follow urinary retention protocol if ordered  2/22/2020 1114 by Ami Cardozo RN  Outcome: Adequate for Discharge  2/22/2020 0735 by Ami Cardozo RN  Outcome: Progressing  Goal: Absence of urinary retention  Description  INTERVENTIONS:  - Assess patients ability to void and empty bladder  - Monitor I/O  - Bladder scan as needed  - Discuss with physician/AP medications to alleviate retention as needed  - Discuss catheterization for long term situations as appropriate  2/22/2020 1114 by Ami Cardozo RN  Outcome: Adequate for Discharge  2/22/2020 0735 by Ami Cardozo RN  Outcome: Progressing  Goal: Urinary catheter remains patent  Description  INTERVENTIONS:  - Assess patency of urinary catheter  - If patient has a chronic anaya, consider changing catheter if non-functioning  - Follow guidelines for intermittent irrigation of non-functioning urinary catheter  2/22/2020 1114 by Ami Cardozo RN  Outcome: Adequate for Discharge  2/22/2020 0735 by Ami Cardozo RN  Outcome: Progressing

## 2020-02-22 NOTE — ASSESSMENT & PLAN NOTE
Patient complaining of " bump" in her groin  She denies any vaginal discharge  Vaginal exam performed at the bedside with RN Praveen Elizalde present  Evidence of vaginal "sore" on inferior portion of left-side of labia majora  Nonbleeding with no discharge present  No evidence of infection  Will prescribe lidocaine gel for relief of discomfort and have patient follow up in the outpatient setting with gynecology

## 2020-02-22 NOTE — ASSESSMENT & PLAN NOTE
· Present on admission as evidenced by fever, tachycardia, mild-leukocytosis (WBCs 11 48)  Suspected source of infection urine/pyelo  · Continue IV ceftriaxone  · Urine culture growing E coli sensitive to Augmentin  · GC chlamydia negative  Patient has been transitioned to oral Augmentin 500-125 mg BID for next 5 days  The patient is stable and has remarkably improved  She is hemodynamically stable for discharge to home today

## 2020-02-22 NOTE — NURSING NOTE
Reviewed discharge instructions with patient at bedside  Patient verbalized understanding of all discharge instructions  Discharge instructions handed to patient at time of discharge  Patient understands to  medications from community pharmacy  IV removed, VSS, pain controlled  All questions addressed, no further questions or concerns at this time  Patient discharged to home

## 2020-02-22 NOTE — PLAN OF CARE
Problem: METABOLIC, FLUID AND ELECTROLYTES - ADULT  Goal: Electrolytes maintained within normal limits  Description  INTERVENTIONS:  - Monitor labs and assess patient for signs and symptoms of electrolyte imbalances  - Administer electrolyte replacement as ordered  - Monitor response to electrolyte replacements, including repeat lab results as appropriate  - Instruct patient on fluid and nutrition as appropriate  Outcome: Progressing  Goal: Fluid balance maintained  Description  INTERVENTIONS:  - Monitor labs   - Monitor I/O and WT  - Instruct patient on fluid and nutrition as appropriate  - Assess for signs & symptoms of volume excess or deficit  Outcome: Progressing  Goal: Glucose maintained within target range  Description  INTERVENTIONS:  - Monitor Blood Glucose as ordered  - Assess for signs and symptoms of hyperglycemia and hypoglycemia  - Administer ordered medications to maintain glucose within target range  - Assess nutritional intake and initiate nutrition service referral as needed  Outcome: Progressing     Problem: PAIN - ADULT  Goal: Verbalizes/displays adequate comfort level or baseline comfort level  Description  Interventions:  - Encourage patient to monitor pain and request assistance  - Assess pain using appropriate pain scale  - Administer analgesics based on type and severity of pain and evaluate response  - Implement non-pharmacological measures as appropriate and evaluate response  - Consider cultural and social influences on pain and pain management  - Notify physician/advanced practitioner if interventions unsuccessful or patient reports new pain  Outcome: Progressing     Problem: INFECTION - ADULT  Goal: Absence or prevention of progression during hospitalization  Description  INTERVENTIONS:  - Assess and monitor for signs and symptoms of infection  - Monitor lab/diagnostic results  - Monitor all insertion sites, i e  indwelling lines, tubes, and drains  - Monitor endotracheal if appropriate and nasal secretions for changes in amount and color  - [place] appropriate cooling/warming therapies per order  - Administer medications as ordered  - Instruct and encourage patient and family to use good hand hygiene technique  - Identify and instruct in appropriate isolation precautions for identified infection/condition  Outcome: Progressing  Goal: Absence of fever/infection during neutropenic period  Description  INTERVENTIONS:  - Monitor WBC    Outcome: Progressing     Problem: SAFETY ADULT  Goal: Patient will remain free of falls  Description  INTERVENTIONS:  - Assess patient frequently for physical needs  -  Identify cognitive and physical deficits and behaviors that affect risk of falls    -  [place] fall precautions as indicated by assessment   - Educate patient/family on patient safety including physical limitations  - Instruct patient to call for assistance with activity based on assessment  - Modify environment to reduce risk of injury  - Consider OT/PT consult to assist with strengthening/mobility  Outcome: Progressing  Goal: Maintain or return to baseline ADL function  Description  INTERVENTIONS:  -  Assess patient's ability to carry out ADLs; assess patient's baseline for ADL function and identify physical deficits which impact ability to perform ADLs (bathing, care of mouth/teeth, toileting, grooming, dressing, etc )  - Assess/evaluate cause of self-care deficits   - Assess range of motion  - Assess patient's mobility; develop plan if impaired  - Assess patient's need for assistive devices and provide as appropriate  - Encourage maximum independence but intervene and supervise when necessary  - Involve family in performance of ADLs  - Assess for home care needs following discharge   - Consider OT consult to assist with ADL evaluation and planning for discharge  - Provide patient education as appropriate  Outcome: Progressing  Goal: Maintain or return mobility status to optimal level  Description  INTERVENTIONS:  - Assess patient's baseline mobility status (ambulation, transfers, stairs, etc )    - Identify cognitive and physical deficits and behaviors that affect mobility  - Identify mobility aids required to assist with transfers and/or ambulation (gait belt, sit-to-stand, lift, walker, cane, etc )  - [place] fall precautions as indicated by assessment  - Record patient progress and toleration of activity level on Mobility SBAR; progress patient to next Phase/Stage  - Instruct patient to call for assistance with activity based on assessment  - Consider rehabilitation consult to assist with strengthening/weightbearing, etc   Outcome: Progressing     Problem: DISCHARGE PLANNING  Goal: Discharge to home or other facility with appropriate resources  Description  INTERVENTIONS:  - Identify barriers to discharge w/patient and caregiver  - Arrange for needed discharge resources and transportation as appropriate  - Identify discharge learning needs (meds, wound care, etc )  - Arrange for interpretive services to assist at discharge as needed  - Refer to Case Management Department for coordinating discharge planning if the patient needs post-hospital services based on physician/advanced practitioner order or complex needs related to functional status, cognitive ability, or social support system  Outcome: Progressing     Problem: Potential for Falls  Goal: Patient will remain free of falls  Description  INTERVENTIONS:  - Assess patient frequently for physical needs  -  Identify cognitive and physical deficits and behaviors that affect risk of falls    -  [place] fall precautions as indicated by assessment   - Educate patient/family on patient safety including physical limitations  - Instruct patient to call for assistance with activity based on assessment  - Modify environment to reduce risk of injury  - Consider OT/PT consult to assist with strengthening/mobility  Outcome: Progressing Problem: Knowledge Deficit  Goal: Patient/family/caregiver demonstrates understanding of disease process, treatment plan, medications, and discharge instructions  Description  Complete learning assessment and assess knowledge base    Interventions:  - Provide teaching at level of understanding  - Provide teaching via preferred learning methods  Outcome: Progressing     Problem: GENITOURINARY - ADULT  Goal: Maintains or returns to baseline urinary function  Description  INTERVENTIONS:  - Assess urinary function  - Encourage oral fluids to ensure adequate hydration if ordered  - Administer IV fluids as ordered to ensure adequate hydration  - Administer ordered medications as needed  - Offer frequent toileting  - Follow urinary retention protocol if ordered  Outcome: Progressing  Goal: Absence of urinary retention  Description  INTERVENTIONS:  - Assess patients ability to void and empty bladder  - Monitor I/O  - Bladder scan as needed  - Discuss with physician/AP medications to alleviate retention as needed  - Discuss catheterization for long term situations as appropriate  Outcome: Progressing  Goal: Urinary catheter remains patent  Description  INTERVENTIONS:  - Assess patency of urinary catheter  - If patient has a chronic anaya, consider changing catheter if non-functioning  - Follow guidelines for intermittent irrigation of non-functioning urinary catheter  Outcome: Progressing

## 2020-02-22 NOTE — DISCHARGE SUMMARY
Discharge- Rosalba Vivas 1990, 34 y o  female MRN: [id number]    Unit/Bed#: [facility] 2 [address] 221-01 Encounter: [id number]    Primary Care Provider: Ifeanyi Herrera MD   Date and time admitted to hospital: 2/19/2020  4:24 PM        * Sepsis [facility])  Assessment & Plan  · Present on admission as evidenced by fever, tachycardia, mild-leukocytosis (WBCs 11 48)  Suspected source of infection urine/pyelo  · Continue IV ceftriaxone  · Urine culture growing E coli sensitive to Augmentin  · GC chlamydia negative  Patient has been transitioned to oral Augmentin 500-125 mg BID for next 5 days  The patient is stable and has remarkably improved  She is hemodynamically stable for discharge to home today  Pyelonephritis of left kidney  Assessment & Plan  · Patient presented with generalized body aches, fever, left flank pain, increased urinary frequency and urgency, and suprapubic pressure  · CT revealed focal left lower pole pyelo and mild bladder wall thickening and fat stranding in keeping with cystitis   Antibiotics transitioned to oral Augmentin which she will take for 5 more days  Urine cultures growing E coli  Blood cultures negative  Hypokalemia  Assessment & Plan  Resolved with K 3 8  Vaginal pain  Assessment & Plan  Patient complaining of " bump" in her groin  She denies any vaginal discharge  Vaginal exam performed at the bedside with DMITRI Camacho present  Evidence of vaginal "sore" on inferior portion of left-side of labia majora  Nonbleeding with no discharge present  No evidence of infection  Will prescribe lidocaine gel for relief of discomfort and have patient follow up in the outpatient setting with gynecology              Discharging Physician / Practitioner: Flakita Ambriz MD  PCP: Ifeanyi Herrera MD  Admission Date:   Admission Orders (From admission, onward)     Ordered        02/19/20 2014  Inpatient Admission (expected length of stay for this patient Order details is greater than two midnights)  Once                   Discharge Date: 02/22/20    Resolved Problems  Date Reviewed: 2/22/2020    None          Consultations During Hospital Stay:  · None    Procedures Performed:   · Chest x-ray PA and lateral 02/19/2020, CT abdomen and pelvis with contrast 02/19/2020  Significant Findings / Test Results:   · Chest x-ray PA and lateral 2/19-no acute cardiopulmonary disease  · CT abdomen and pelvis with contrast 2/19-focal left lower pole pyelonephritis  Mild bladder wall thickening and fat stranding in keeping with cystitis  Incidental Findings:   · None     Test Results Pending at Discharge (will require follow up): · None     Outpatient Tests Requested:  · None    Complications:  None    Reason for Admission:  Pyelonephritis of left kidney    Hospital Course:     Lianet Hudson is a 34 y o  female patient who originally presented to the hospital on 2/19/2020 due to generalized pain in her body  Due to her primarily [place]ian-speaking ability, she was unable to express herself adequately in [place]  The  phone was then used to assist with obtaining HPI  She reported 1 week of left-sided flank pain which he described as a twisting type of pain  She also reported fever and chills at home for 2 days prior to her presentation  She also had urinary frequency, urgency, and suprapubic pressure  She also reported a vaginal discharge for 2 days prior to her presentation although for the remainder of her hospitalization she continued to deny the presence of a vaginal discharge  She presented to the ED at [facility] where evaluation showed her to be in sepsis due to left lower pole kidney pyelonephritis  She was started on IV fluids, IV Rocephin, pain control, antiemetics  She was admitted to the medical-surgical unit  Urine cultures were obtained as well as blood cultures  She progressed throughout her hospitalization and improved on the IV Rocephin    She developed no further fevers  Her urine culture grew E coli sensitivity to Augmentin  Blood cultures were negative  After 2 days of IV antibiotic therapy she was switched to Augmentin  She was also tolerating a regular diet  Her IV fluids were discontinued  She had been complaining of a "bump" or "sore" in her vaginal area for the past 2 days prior to her discharge  Vaginal exam at the bedside with an RN present revealed evidence of a left labia majora sore on the inferior portion  There was no evidence of vaginal discharge  She was prescribed some lidocaine gel to go home with  Will attempt to obtain further history regarding the patient's vaginal discharge and if appropriate will discharge her home on further antibiotic therapy to deal with vaginal discharge  She is to follow-up with gynecology in the outpatient setting  She is currently hemodynamically stable for discharge to home today  Please see above list of diagnoses and related plan for additional information  Condition at Discharge: good     Discharge Day Visit / Exam:     Subjective:  Patient complaining of sore or bump in her vaginal area  She denies any vaginal discharge  She also complained of some mild left-sided rib pain with inspiration  She denied any cough or shortness of breath  Vitals: Blood Pressure: 114/65 (02/22/20 0722)  Pulse: 65 (02/22/20 0722)  Temperature: 98 6 °F (37 °C) (02/22/20 0722)  Temp Source: Oral (02/21/20 0708)  Respirations: 16 (02/22/20 0722)  Weight - Scale: 74 1 kg (163 lb 5 8 oz) (02/19/20 1623)  SpO2: 95 % (02/22/20 0722)  Exam:   Physical Exam   Constitutional: She is oriented to person, place, and time  She appears well-developed and well-nourished  No distress  HENT:   Head: Normocephalic and atraumatic  Eyes: Pupils are equal, round, and reactive to light  EOM are normal    Neck: Normal range of motion  Neck supple  Cardiovascular: Normal rate, regular rhythm and normal heart sounds  Pulmonary/Chest: Effort normal and breath sounds normal  She has no wheezes  She has no rales  Abdominal: Soft  Bowel sounds are normal    Genitourinary:   Genitourinary Comments: Presence of a reddish type sore on the inferior portion of her left labia majora  There is no evidence of vaginal discharge  Neurological: She is alert and oriented to person, place, and time  Skin: Skin is warm and dry  She is not diaphoretic  Discussion with Family:  Yes    Discharge instructions/Information to patient and family:   See after visit summary for information provided to patient and family  Provisions for Follow-Up Care:  See after visit summary for information related to follow-up care and any pertinent home health orders  Disposition:     Home    For Discharges to [facility]:   · Not Applicable to this Patient - Not Applicable to this Patient    Planned Readmission:  None     Discharge Statement:  I spent 30 minutes discharging the patient  This time was spent on the day of discharge  I had direct contact with the patient on the day of discharge  Greater than 50% of the total time was spent examining patient, answering all patient questions, arranging and discussing plan of care with patient as well as directly providing post-discharge instructions  Additional time then spent on discharge activities  Discharge Medications:  See after visit summary for reconciled discharge medications provided to patient and family        ** Please Note: This note has been constructed using a voice recognition system **

## 2020-02-22 NOTE — ASSESSMENT & PLAN NOTE
· Patient presented with generalized body aches, fever, left flank pain, increased urinary frequency and urgency, and suprapubic pressure  · CT revealed focal left lower pole pyelo and mild bladder wall thickening and fat stranding in keeping with cystitis   Antibiotics transitioned to oral Augmentin which she will take for 5 more days  Urine cultures growing E coli  Blood cultures negative

## 2020-02-22 NOTE — PLAN OF CARE
Problem: METABOLIC, FLUID AND ELECTROLYTES - ADULT  Goal: Electrolytes maintained within normal limits  Description  INTERVENTIONS:  - Monitor labs and assess patient for signs and symptoms of electrolyte imbalances  - Administer electrolyte replacement as ordered  - Monitor response to electrolyte replacements, including repeat lab results as appropriate  - Instruct patient on fluid and nutrition as appropriate  Outcome: Progressing  Goal: Fluid balance maintained  Description  INTERVENTIONS:  - Monitor labs   - Monitor I/O and WT  - Instruct patient on fluid and nutrition as appropriate  - Assess for signs & symptoms of volume excess or deficit  Outcome: Progressing  Goal: Glucose maintained within target range  Description  INTERVENTIONS:  - Monitor Blood Glucose as ordered  - Assess for signs and symptoms of hyperglycemia and hypoglycemia  - Administer ordered medications to maintain glucose within target range  - Assess nutritional intake and initiate nutrition service referral as needed  Outcome: Progressing     Problem: PAIN - ADULT  Goal: Verbalizes/displays adequate comfort level or baseline comfort level  Description  Interventions:  - Encourage patient to monitor pain and request assistance  - Assess pain using appropriate pain scale  - Administer analgesics based on type and severity of pain and evaluate response  - Implement non-pharmacological measures as appropriate and evaluate response  - Consider cultural and social influences on pain and pain management  - Notify physician/advanced practitioner if interventions unsuccessful or patient reports new pain  Outcome: Progressing     Problem: INFECTION - ADULT  Goal: Absence or prevention of progression during hospitalization  Description  INTERVENTIONS:  - Assess and monitor for signs and symptoms of infection  - Monitor lab/diagnostic results  - Monitor all insertion sites, i e  indwelling lines, tubes, and drains  - Monitor endotracheal if appropriate and nasal secretions for changes in amount and color  - [place] appropriate cooling/warming therapies per order  - Administer medications as ordered  - Instruct and encourage patient and family to use good hand hygiene technique  - Identify and instruct in appropriate isolation precautions for identified infection/condition  Outcome: Progressing  Goal: Absence of fever/infection during neutropenic period  Description  INTERVENTIONS:  - Monitor WBC    Outcome: Progressing     Problem: SAFETY ADULT  Goal: Patient will remain free of falls  Description  INTERVENTIONS:  - Assess patient frequently for physical needs  -  Identify cognitive and physical deficits and behaviors that affect risk of falls    -  [place] fall precautions as indicated by assessment   - Educate patient/family on patient safety including physical limitations  - Instruct patient to call for assistance with activity based on assessment  - Modify environment to reduce risk of injury  - Consider OT/PT consult to assist with strengthening/mobility  Outcome: Progressing  Goal: Maintain or return to baseline ADL function  Description  INTERVENTIONS:  -  Assess patient's ability to carry out ADLs; assess patient's baseline for ADL function and identify physical deficits which impact ability to perform ADLs (bathing, care of mouth/teeth, toileting, grooming, dressing, etc )  - Assess/evaluate cause of self-care deficits   - Assess range of motion  - Assess patient's mobility; develop plan if impaired  - Assess patient's need for assistive devices and provide as appropriate  - Encourage maximum independence but intervene and supervise when necessary  - Involve family in performance of ADLs  - Assess for home care needs following discharge   - Consider OT consult to assist with ADL evaluation and planning for discharge  - Provide patient education as appropriate  Outcome: Progressing  Goal: Maintain or return mobility status to optimal level  Description  INTERVENTIONS:  - Assess patient's baseline mobility status (ambulation, transfers, stairs, etc )    - Identify cognitive and physical deficits and behaviors that affect mobility  - Identify mobility aids required to assist with transfers and/or ambulation (gait belt, sit-to-stand, lift, walker, cane, etc )  - [place] fall precautions as indicated by assessment  - Record patient progress and toleration of activity level on Mobility SBAR; progress patient to next Phase/Stage  - Instruct patient to call for assistance with activity based on assessment  - Consider rehabilitation consult to assist with strengthening/weightbearing, etc   Outcome: Progressing     Problem: DISCHARGE PLANNING  Goal: Discharge to home or other facility with appropriate resources  Description  INTERVENTIONS:  - Identify barriers to discharge w/patient and caregiver  - Arrange for needed discharge resources and transportation as appropriate  - Identify discharge learning needs (meds, wound care, etc )  - Arrange for interpretive services to assist at discharge as needed  - Refer to Case Management Department for coordinating discharge planning if the patient needs post-hospital services based on physician/advanced practitioner order or complex needs related to functional status, cognitive ability, or social support system  Outcome: Progressing     Problem: Potential for Falls  Goal: Patient will remain free of falls  Description  INTERVENTIONS:  - Assess patient frequently for physical needs  -  Identify cognitive and physical deficits and behaviors that affect risk of falls    -  [place] fall precautions as indicated by assessment   - Educate patient/family on patient safety including physical limitations  - Instruct patient to call for assistance with activity based on assessment  - Modify environment to reduce risk of injury  - Consider OT/PT consult to assist with strengthening/mobility  Outcome: Progressing Problem: Knowledge Deficit  Goal: Patient/family/caregiver demonstrates understanding of disease process, treatment plan, medications, and discharge instructions  Description  Complete learning assessment and assess knowledge base    Interventions:  - Provide teaching at level of understanding  - Provide teaching via preferred learning methods  Outcome: Progressing     Problem: GENITOURINARY - ADULT  Goal: Maintains or returns to baseline urinary function  Description  INTERVENTIONS:  - Assess urinary function  - Encourage oral fluids to ensure adequate hydration if ordered  - Administer IV fluids as ordered to ensure adequate hydration  - Administer ordered medications as needed  - Offer frequent toileting  - Follow urinary retention protocol if ordered  Outcome: Progressing  Goal: Absence of urinary retention  Description  INTERVENTIONS:  - Assess patients ability to void and empty bladder  - Monitor I/O  - Bladder scan as needed  - Discuss with physician/AP medications to alleviate retention as needed  - Discuss catheterization for long term situations as appropriate  Outcome: Progressing  Goal: Urinary catheter remains patent  Description  INTERVENTIONS:  - Assess patency of urinary catheter  - If patient has a chronic anaya, consider changing catheter if non-functioning  - Follow guidelines for intermittent irrigation of non-functioning urinary catheter  Outcome: Progressing

## 2020-02-24 ENCOUNTER — TRANSITIONAL CARE MANAGEMENT (OUTPATIENT)
Dept: FAMILY MEDICINE CLINIC | Facility: CLINIC | Age: 30
End: 2020-02-24

## 2020-02-25 LAB
BACTERIA BLD CULT: NORMAL
BACTERIA BLD CULT: NORMAL

## 2020-02-28 ENCOUNTER — OFFICE VISIT (OUTPATIENT)
Dept: FAMILY MEDICINE CLINIC | Facility: CLINIC | Age: 30
End: 2020-02-28

## 2020-02-28 VITALS
TEMPERATURE: 98.6 F | WEIGHT: 162.4 LBS | HEART RATE: 70 BPM | SYSTOLIC BLOOD PRESSURE: 98 MMHG | OXYGEN SATURATION: 98 % | DIASTOLIC BLOOD PRESSURE: 64 MMHG | BODY MASS INDEX: 30.66 KG/M2 | RESPIRATION RATE: 18 BRPM | HEIGHT: 61 IN

## 2020-02-28 DIAGNOSIS — N12 PYELONEPHRITIS OF LEFT KIDNEY: Primary | ICD-10-CM

## 2020-02-28 DIAGNOSIS — R53.83 TIREDNESS: ICD-10-CM

## 2020-02-28 DIAGNOSIS — Z23 NEEDS FLU SHOT: ICD-10-CM

## 2020-02-28 PROCEDURE — 1111F DSCHRG MED/CURRENT MED MERGE: CPT | Performed by: PHYSICIAN ASSISTANT

## 2020-02-28 PROCEDURE — 99495 TRANSJ CARE MGMT MOD F2F 14D: CPT | Performed by: PHYSICIAN ASSISTANT

## 2020-02-28 RX ORDER — PHENOL 1.4 %
1 AEROSOL, SPRAY (ML) MUCOUS MEMBRANE DAILY
Qty: 90 TABLET | Refills: 0 | Status: SHIPPED | OUTPATIENT
Start: 2020-02-28 | End: 2020-04-29

## 2020-02-28 NOTE — PROGRESS NOTES
Assessment/Plan:    Pyelonephritis of left kidney  Doing well and asymptomatic  Recommend increasing water intake and urinating more frequently  We discussed that if she continues to get UTIs I would recommend consult with urology  We also discussed that is some will to have a little fatigue after being septic  I would recommend that she start on a multi vitamin  Discussed that over time her symptoms should improve  Problem List Items Addressed This Visit        Genitourinary    Pyelonephritis of left kidney - Primary     Doing well and asymptomatic  Recommend increasing water intake and urinating more frequently  We discussed that if she continues to get UTIs I would recommend consult with urology  We also discussed that is some will to have a little fatigue after being septic  I would recommend that she start on a multi vitamin  Discussed that over time her symptoms should improve  Other Visit Diagnoses     Needs flu shot        Tiredness        Relevant Medications    Multiple Vitamins-Minerals (MULTIVITAMIN WOMEN) TABS          Influenza vaccine not currently available in office  Patient can get it from the pharmacy  Subjective:      Patient ID: Aleja Swenson is a 34 y o  female  HPI 34year old female here for follow up   Hospitalization February 19th February 22nd for sepsis secondary to pyelonephritis of the left kidney  She was treated with IV ceftriaxone and then transition to Augmentin after her urine was found sensitive to it and found to E coli  She was initially found to have hypokalemia but this did resolve  She has been feeling well  No back pain, nausea, vomiting, fevers or problems with urination  She also had a lesion in the vaginal area  It was unclear with this lesion was but she was prescribed lidocaine gel  It was recommended that she follow-up with gynecology  She is scheduled Gynecology for another 3 days    TCM Call (since 1/28/2020)     Date and time call was made  2/24/2020  9:11 AM    Hospital care reviewed  Records reviewed    Patient was hospitialized at  [address]    Date of Admission  02/19/20    Date of discharge  02/22/20    Diagnosis  Sepsis    Disposition  Home    Current Symptoms  Headache    Headache pain severity  Mild    Headache pain onset  Sudden; Unable to tell    Headache Radition / Laterality  Not radiating    Frontal Area  Bilateral; Left; Right    Episode pattern  Rare    Headache pain level  7    Headache cause / trigger  No known event      TCM Call (since 1/28/2020)     Modifying factors-feels better  Opiod Analgesics    Clinical progress  Unchanged    Post hospital issues  None    Should patient be enrolled in anticoag monitoring? No    Scheduled for follow up? Yes    Did you obtain your prescribed medications  Yes    Do you need help managing your prescriptions or medications  No    Is transportation to your appointment needed  No    I have advised the patient to call PCP with any new or worsening symptoms  [facility] members    Are you recieving any outpatient services  No    Are you recieving home care services  No    Are you using any community resources  No    Current waiver services  No    Have you fallen in the last 12 months  No    Interperter language line needed  Yes    Counseling  Patient        The following portions of the patient's history were reviewed and updated as appropriate:   She  has no past medical history on file    She   Patient Active Problem List    Diagnosis Date Noted    Vaginal pain 02/21/2020    Sepsis ([address] ) 02/19/2020    Pyelonephritis of left kidney 02/19/2020    Hypokalemia 02/19/2020    Vaginitis due to Candida 09/19/2019    Laceration of right hand without foreign body 07/10/2019    Breast tenderness in female 07/10/2019    Bacterial conjunctivitis of right eye 10/09/2018    Acute vaginitis 10/09/2018    Encounter for counseling regarding contraception 2018    Hemorrhoids 01/15/2018     She  has a past surgical history that includes  section (2017)  Her family history includes Diabetes in her maternal grandmother; No Known Problems in her brother, brother, brother, brother, father, mother, paternal grandmother, sister, and sister  She  reports that she quit smoking about 2 years ago  Her smoking use included cigarettes  She smoked 0 20 packs per day  She has never used smokeless tobacco  She reports that she drank alcohol  She reports that she does not use drugs  Current Outpatient Medications   Medication Sig Dispense Refill    ibuprofen (MOTRIN) 400 mg tablet Take 1 tablet (400 mg total) by mouth every 6 (six) hours as needed for mild pain 20 tablet 0    metroNIDAZOLE (FLAGYL) 500 mg tablet Take 1 tablet (500 mg total) by mouth every 12 (twelve) hours for 14 doses 14 tablet 0    oxyCODONE-acetaminophen (PERCOCET) 5-325 mg per tablet Take 1 tablet by mouth every 4 (four) hours as needed for moderate pain for up to 10 daysMax Daily Amount: 6 tablets 10 tablet 0    lidocaine (URO-JET) 2 % urethral/mucosal gel Apply 1 application topically daily as needed for painful procedure(s) Please dispense 5 mL syringe (Patient not taking: Reported on 2020) 1 Syringe 1    Multiple Vitamins-Minerals (MULTIVITAMIN WOMEN) TABS Take 1 tablet by mouth daily 90 tablet 0     No current facility-administered medications for this visit        Current Outpatient Medications on File Prior to Visit   Medication Sig    ibuprofen (MOTRIN) 400 mg tablet Take 1 tablet (400 mg total) by mouth every 6 (six) hours as needed for mild pain    metroNIDAZOLE (FLAGYL) 500 mg tablet Take 1 tablet (500 mg total) by mouth every 12 (twelve) hours for 14 doses    oxyCODONE-acetaminophen (PERCOCET) 5-325 mg per tablet Take 1 tablet by mouth every 4 (four) hours as needed for moderate pain for up to 10 daysMax Daily Amount: 6 tablets    [] amoxicillin-clavulanate (AUGMENTIN) 500-125 mg per tablet Take 1 tablet by mouth every 12 (twelve) hours for 10 doses    lidocaine (URO-JET) 2 % urethral/mucosal gel Apply 1 application topically daily as needed for painful procedure(s) Please dispense 5 mL syringe (Patient not taking: Reported on 2/28/2020)     No current facility-administered medications on file prior to visit  She is allergic to meperidine       Review of Systems   Constitutional: Negative for activity change, appetite change, chills, fatigue and unexpected weight change  HENT: Negative for dental problem, ear pain, hearing loss and sore throat  Eyes: Negative for visual disturbance  Respiratory: Negative for cough and wheezing  Cardiovascular: Negative for chest pain  Gastrointestinal: Negative for abdominal pain, constipation, diarrhea and vomiting  Genitourinary: Negative for difficulty urinating and dysuria  Musculoskeletal: Negative for myalgias  Skin: Negative for rash  Neurological: Negative for dizziness and headaches  Psychiatric/Behavioral: Negative for behavioral problems  Objective:      BP 98/64 (BP Location: Left arm, Patient Position: Sitting, Cuff Size: Standard)   Pulse 70   Temp 98 6 °F (37 °C) (Temporal)   Resp 18   Ht 5' 1" (1 549 m)   Wt 73 7 kg (162 lb 6 4 oz)   LMP 02/14/2020   SpO2 98%   BMI 30 69 kg/m²          Physical Exam   Constitutional: She is oriented to person, place, and time  She appears well-developed and well-nourished  HENT:   Head: Normocephalic and atraumatic  Right Ear: External ear normal    Left Ear: External ear normal    Nose: Nose normal    Mouth/Throat: Oropharynx is clear and moist    Eyes: Conjunctivae are normal    Neck: Normal range of motion  Neck supple  No thyromegaly present  Cardiovascular: Normal rate, regular rhythm and normal heart sounds  No murmur heard  Pulmonary/Chest: Effort normal and breath sounds normal  No respiratory distress  Abdominal: Soft  Bowel sounds are normal  She exhibits no mass  There is no tenderness  There is no guarding  Musculoskeletal: Normal range of motion  Lymphadenopathy:     She has no cervical adenopathy  Neurological: She is alert and oriented to person, place, and time  Skin: Skin is warm  Psychiatric: She has a normal mood and affect  Her behavior is normal    Nursing note and vitals reviewed

## 2020-02-28 NOTE — ASSESSMENT & PLAN NOTE
Doing well and asymptomatic  Recommend increasing water intake and urinating more frequently  We discussed that if she continues to get UTIs I would recommend consult with urology  We also discussed that is some will to have a little fatigue after being septic  I would recommend that she start on a multi vitamin  Discussed that over time her symptoms should improve

## 2020-03-11 ENCOUNTER — TELEPHONE (OUTPATIENT)
Dept: OBGYN CLINIC | Facility: CLINIC | Age: 30
End: 2020-03-11

## 2020-04-20 ENCOUNTER — HOSPITAL ENCOUNTER (EMERGENCY)
Facility: HOSPITAL | Age: 30
Discharge: HOME | End: 2020-04-20
Attending: EMERGENCY MEDICINE | Admitting: EMERGENCY MEDICINE
Payer: COMMERCIAL

## 2020-04-20 VITALS
DIASTOLIC BLOOD PRESSURE: 66 MMHG | TEMPERATURE: 98.8 F | SYSTOLIC BLOOD PRESSURE: 133 MMHG | BODY MASS INDEX: 30.74 KG/M2 | OXYGEN SATURATION: 96 % | HEART RATE: 120 BPM | RESPIRATION RATE: 18 BRPM | WEIGHT: 162.7 LBS

## 2020-04-20 DIAGNOSIS — N12 PYELONEPHRITIS: ICD-10-CM

## 2020-04-20 DIAGNOSIS — R51.9 HEADACHE: Primary | ICD-10-CM

## 2020-04-20 DIAGNOSIS — Z34.90 EARLY STAGE OF PREGNANCY: ICD-10-CM

## 2020-04-20 LAB
ATRIAL RATE: 94 BPM
BACTERIA UR QL AUTO: ABNORMAL /HPF
BILIRUB UR QL STRIP: NEGATIVE
CLARITY UR: CLEAR
COLOR UR: YELLOW
EXT PREG TEST URINE: POSITIVE
EXT. CONTROL ED NAV: ABNORMAL
GLUCOSE UR STRIP-MCNC: NEGATIVE MG/DL
HGB UR QL STRIP.AUTO: ABNORMAL
KETONES UR STRIP-MCNC: NEGATIVE MG/DL
LEUKOCYTE ESTERASE UR QL STRIP: ABNORMAL
NITRITE UR QL STRIP: NEGATIVE
NON-SQ EPI CELLS URNS QL MICRO: ABNORMAL /HPF
P AXIS: 45 DEGREES
PH UR STRIP.AUTO: 6.5 [PH] (ref 4.5–8)
PR INTERVAL: 138 MS
PROT UR STRIP-MCNC: NEGATIVE MG/DL
QRS AXIS: 74 DEGREES
QRSD INTERVAL: 84 MS
QT INTERVAL: 330 MS
QTC INTERVAL: 412 MS
RBC #/AREA URNS AUTO: ABNORMAL /HPF
SP GR UR STRIP.AUTO: 1.01 (ref 1–1.03)
T WAVE AXIS: 17 DEGREES
UROBILINOGEN UR QL STRIP.AUTO: 0.2 E.U./DL
VENTRICULAR RATE: 94 BPM
WBC #/AREA URNS AUTO: ABNORMAL /HPF

## 2020-04-20 PROCEDURE — 93010 ELECTROCARDIOGRAM REPORT: CPT | Performed by: INTERNAL MEDICINE

## 2020-04-20 PROCEDURE — 96375 TX/PRO/DX INJ NEW DRUG ADDON: CPT

## 2020-04-20 PROCEDURE — 81001 URINALYSIS AUTO W/SCOPE: CPT

## 2020-04-20 PROCEDURE — 99284 EMERGENCY DEPT VISIT MOD MDM: CPT | Performed by: EMERGENCY MEDICINE

## 2020-04-20 PROCEDURE — 87077 CULTURE AEROBIC IDENTIFY: CPT

## 2020-04-20 PROCEDURE — 96365 THER/PROPH/DIAG IV INF INIT: CPT

## 2020-04-20 PROCEDURE — 81025 URINE PREGNANCY TEST: CPT | Performed by: EMERGENCY MEDICINE

## 2020-04-20 PROCEDURE — 87086 URINE CULTURE/COLONY COUNT: CPT

## 2020-04-20 PROCEDURE — 99285 EMERGENCY DEPT VISIT HI MDM: CPT

## 2020-04-20 PROCEDURE — 93005 ELECTROCARDIOGRAM TRACING: CPT

## 2020-04-20 PROCEDURE — 87186 SC STD MICRODIL/AGAR DIL: CPT

## 2020-04-20 RX ORDER — METOCLOPRAMIDE 10 MG/1
10 TABLET ORAL EVERY 6 HOURS
Qty: 30 TABLET | Refills: 0 | Status: SHIPPED | OUTPATIENT
Start: 2020-04-20 | End: 2020-04-29

## 2020-04-20 RX ORDER — FAMOTIDINE 20 MG
1 TABLET ORAL DAILY
Qty: 30 EACH | Refills: 0 | Status: SHIPPED | OUTPATIENT
Start: 2020-04-20 | End: 2020-04-29

## 2020-04-20 RX ORDER — METOCLOPRAMIDE HYDROCHLORIDE 5 MG/ML
10 INJECTION INTRAMUSCULAR; INTRAVENOUS ONCE
Status: COMPLETED | OUTPATIENT
Start: 2020-04-20 | End: 2020-04-20

## 2020-04-20 RX ORDER — AMOXICILLIN AND CLAVULANATE POTASSIUM 500; 125 MG/1; MG/1
1 TABLET, FILM COATED ORAL EVERY 12 HOURS SCHEDULED
Qty: 14 TABLET | Refills: 0 | Status: SHIPPED | OUTPATIENT
Start: 2020-04-20 | End: 2020-04-29

## 2020-04-20 RX ORDER — ACETAMINOPHEN 325 MG/1
650 TABLET ORAL ONCE
Status: COMPLETED | OUTPATIENT
Start: 2020-04-20 | End: 2020-04-20

## 2020-04-20 RX ADMIN — CEFTRIAXONE SODIUM 1000 MG: 10 INJECTION, POWDER, FOR SOLUTION INTRAVENOUS at 06:08

## 2020-04-20 RX ADMIN — ACETAMINOPHEN 650 MG: 325 TABLET ORAL at 06:08

## 2020-04-20 RX ADMIN — METOCLOPRAMIDE 10 MG: 5 INJECTION, SOLUTION INTRAMUSCULAR; INTRAVENOUS at 06:07

## 2020-04-20 RX ADMIN — SODIUM CHLORIDE 1000 ML: 0.9 INJECTION, SOLUTION INTRAVENOUS at 06:08

## 2020-04-22 LAB — BACTERIA UR CULT: ABNORMAL

## 2020-04-29 ENCOUNTER — TELEMEDICINE (OUTPATIENT)
Dept: OBGYN CLINIC | Facility: CLINIC | Age: 30
End: 2020-04-29

## 2020-04-29 DIAGNOSIS — O09.899 HISTORY OF PRETERM DELIVERY, CURRENTLY PREGNANT: ICD-10-CM

## 2020-04-29 DIAGNOSIS — O99.210 OBESITY IN PREGNANCY: ICD-10-CM

## 2020-04-29 DIAGNOSIS — O34.219 PREVIOUS CESAREAN DELIVERY, ANTEPARTUM: ICD-10-CM

## 2020-04-29 DIAGNOSIS — Z3A.01 6 WEEKS GESTATION OF PREGNANCY: Primary | ICD-10-CM

## 2020-04-29 DIAGNOSIS — O09.299 PRIOR PREGNANCY COMPLICATED BY IUGR, ANTEPARTUM: ICD-10-CM

## 2020-04-29 PROBLEM — N64.4 BREAST TENDERNESS IN FEMALE: Status: RESOLVED | Noted: 2019-07-10 | Resolved: 2020-04-29

## 2020-04-29 PROBLEM — A41.9 SEPSIS (HCC): Status: RESOLVED | Noted: 2020-02-19 | Resolved: 2020-04-29

## 2020-04-29 PROBLEM — Z36.9 ENCOUNTER FOR FETAL ULTRASOUND: Status: ACTIVE | Noted: 2020-04-29

## 2020-04-29 PROBLEM — N76.0 ACUTE VAGINITIS: Status: RESOLVED | Noted: 2018-10-09 | Resolved: 2020-04-29

## 2020-04-29 PROBLEM — B37.31 VAGINITIS DUE TO CANDIDA: Status: RESOLVED | Noted: 2019-09-19 | Resolved: 2020-04-29

## 2020-04-29 PROBLEM — Z30.9 CONTRACEPTION MANAGEMENT: Status: ACTIVE | Noted: 2020-04-29

## 2020-04-29 PROBLEM — K64.9 HEMORRHOIDS: Status: RESOLVED | Noted: 2018-01-15 | Resolved: 2020-04-29

## 2020-04-29 PROBLEM — S61.411A LACERATION OF RIGHT HAND WITHOUT FOREIGN BODY: Status: RESOLVED | Noted: 2019-07-10 | Resolved: 2020-04-29

## 2020-04-29 PROBLEM — H10.9 BACTERIAL CONJUNCTIVITIS OF RIGHT EYE: Status: RESOLVED | Noted: 2018-10-09 | Resolved: 2020-04-29

## 2020-04-29 PROBLEM — E87.6 HYPOKALEMIA: Status: RESOLVED | Noted: 2020-02-19 | Resolved: 2020-04-29

## 2020-04-29 PROBLEM — N12 PYELONEPHRITIS OF LEFT KIDNEY: Status: RESOLVED | Noted: 2020-02-19 | Resolved: 2020-04-29

## 2020-04-29 PROBLEM — Z13.79 GENETIC SCREENING: Status: ACTIVE | Noted: 2020-04-29

## 2020-04-29 PROBLEM — R10.2 VAGINAL PAIN: Status: RESOLVED | Noted: 2020-02-21 | Resolved: 2020-04-29

## 2020-04-29 PROBLEM — Z30.09 ENCOUNTER FOR COUNSELING REGARDING CONTRACEPTION: Status: RESOLVED | Noted: 2018-06-18 | Resolved: 2020-04-29

## 2020-04-29 PROBLEM — B37.3 VAGINITIS DUE TO CANDIDA: Status: RESOLVED | Noted: 2019-09-19 | Resolved: 2020-04-29

## 2020-04-29 PROCEDURE — 99213 OFFICE O/P EST LOW 20 MIN: CPT | Performed by: NURSE PRACTITIONER

## 2020-04-29 RX ORDER — PNV NO.95/FERROUS FUM/FOLIC AC 28MG-0.8MG
1 TABLET ORAL DAILY
Qty: 90 TABLET | Refills: 4 | Status: SHIPPED | OUTPATIENT
Start: 2020-04-29

## 2020-05-01 ENCOUNTER — TELEMEDICINE (OUTPATIENT)
Dept: OBGYN CLINIC | Facility: CLINIC | Age: 30
End: 2020-05-01

## 2020-05-01 DIAGNOSIS — O26.859 SPOTTING IN EARLY PREGNANCY: Primary | ICD-10-CM

## 2020-05-01 PROCEDURE — 99213 OFFICE O/P EST LOW 20 MIN: CPT | Performed by: NURSE PRACTITIONER

## 2020-05-04 ENCOUNTER — PATIENT OUTREACH (OUTPATIENT)
Dept: OBGYN CLINIC | Facility: CLINIC | Age: 30
End: 2020-05-04

## 2020-05-08 ENCOUNTER — PATIENT OUTREACH (OUTPATIENT)
Dept: OBGYN CLINIC | Facility: CLINIC | Age: 30
End: 2020-05-08

## 2020-05-11 ENCOUNTER — APPOINTMENT (OUTPATIENT)
Dept: LAB | Facility: HOSPITAL | Age: 30
End: 2020-05-11
Attending: NURSE PRACTITIONER
Payer: COMMERCIAL

## 2020-05-11 DIAGNOSIS — Z3A.01 6 WEEKS GESTATION OF PREGNANCY: ICD-10-CM

## 2020-05-11 DIAGNOSIS — O99.210 OBESITY IN PREGNANCY: ICD-10-CM

## 2020-05-11 LAB
ABO GROUP BLD: NORMAL
BACTERIA UR QL AUTO: ABNORMAL /HPF
BASOPHILS # BLD AUTO: 0 THOUSANDS/ΜL (ref 0–0.1)
BASOPHILS NFR BLD AUTO: 0 % (ref 0–1)
BILIRUB UR QL STRIP: NEGATIVE
BLD GP AB SCN SERPL QL: NEGATIVE
CLARITY UR: ABNORMAL
COLOR UR: ABNORMAL
EOSINOPHIL # BLD AUTO: 0 THOUSAND/ΜL (ref 0–0.4)
EOSINOPHIL NFR BLD AUTO: 0 % (ref 0–6)
ERYTHROCYTE [DISTWIDTH] IN BLOOD BY AUTOMATED COUNT: 14.3 %
GLUCOSE 1H P 50 G GLC PO SERPL-MCNC: 120 MG/DL
GLUCOSE UR STRIP-MCNC: NEGATIVE MG/DL
HBV SURFACE AG SER QL: NORMAL
HCT VFR BLD AUTO: 39.7 % (ref 36–46)
HCV AB SER QL: NORMAL
HGB BLD-MCNC: 13.4 G/DL (ref 12–16)
HGB UR QL STRIP.AUTO: 10
KETONES UR STRIP-MCNC: NEGATIVE MG/DL
LEUKOCYTE ESTERASE UR QL STRIP: 100
LYMPHOCYTES # BLD AUTO: 1.8 THOUSANDS/ΜL (ref 0.5–4)
LYMPHOCYTES NFR BLD AUTO: 27 % (ref 25–45)
MCH RBC QN AUTO: 29.7 PG (ref 26–34)
MCHC RBC AUTO-ENTMCNC: 33.8 G/DL (ref 31–36)
MCV RBC AUTO: 88 FL (ref 80–100)
MONOCYTES # BLD AUTO: 0.4 THOUSAND/ΜL (ref 0.2–0.9)
MONOCYTES NFR BLD AUTO: 6 % (ref 1–10)
NEUTROPHILS # BLD AUTO: 4.4 THOUSANDS/ΜL (ref 1.8–7.8)
NEUTS SEG NFR BLD AUTO: 66 % (ref 45–65)
NITRITE UR QL STRIP: POSITIVE
NON-SQ EPI CELLS URNS QL MICRO: ABNORMAL /HPF
PH UR STRIP.AUTO: 6 [PH]
PLATELET # BLD AUTO: 152 THOUSANDS/UL (ref 150–450)
PMV BLD AUTO: 10.3 FL (ref 8.9–12.7)
PROT UR STRIP-MCNC: NEGATIVE MG/DL
RBC # BLD AUTO: 4.52 MILLION/UL (ref 4–5.2)
RBC #/AREA URNS AUTO: ABNORMAL /HPF
RH BLD: POSITIVE
RUBV IGG SERPL IA-ACNC: >175 IU/ML
SP GR UR STRIP.AUTO: 1.01 (ref 1–1.04)
SPECIMEN EXPIRATION DATE: NORMAL
UROBILINOGEN UA: NEGATIVE MG/DL
WBC # BLD AUTO: 6.6 THOUSAND/UL (ref 4.5–11)
WBC #/AREA URNS AUTO: ABNORMAL /HPF

## 2020-05-11 PROCEDURE — 83020 HEMOGLOBIN ELECTROPHORESIS: CPT

## 2020-05-11 PROCEDURE — 87186 SC STD MICRODIL/AGAR DIL: CPT

## 2020-05-11 PROCEDURE — 87077 CULTURE AEROBIC IDENTIFY: CPT

## 2020-05-11 PROCEDURE — 82950 GLUCOSE TEST: CPT

## 2020-05-11 PROCEDURE — 86803 HEPATITIS C AB TEST: CPT

## 2020-05-11 PROCEDURE — 87086 URINE CULTURE/COLONY COUNT: CPT

## 2020-05-11 PROCEDURE — 80081 OBSTETRIC PANEL INC HIV TSTG: CPT

## 2020-05-11 PROCEDURE — 81001 URINALYSIS AUTO W/SCOPE: CPT

## 2020-05-11 PROCEDURE — 36415 COLL VENOUS BLD VENIPUNCTURE: CPT

## 2020-05-12 ENCOUNTER — TELEMEDICINE (OUTPATIENT)
Dept: OBGYN CLINIC | Facility: CLINIC | Age: 30
End: 2020-05-12

## 2020-05-12 DIAGNOSIS — Z34.90 PREGNANCY AT EARLY STAGE: Primary | ICD-10-CM

## 2020-05-12 LAB
HIV 1+2 AB+HIV1 P24 AG SERPL QL IA: NORMAL
RPR SER QL: NORMAL

## 2020-05-12 PROCEDURE — 99213 OFFICE O/P EST LOW 20 MIN: CPT | Performed by: NURSE PRACTITIONER

## 2020-05-13 ENCOUNTER — TELEPHONE (OUTPATIENT)
Dept: OBGYN CLINIC | Facility: CLINIC | Age: 30
End: 2020-05-13

## 2020-05-13 DIAGNOSIS — O23.41 URINARY TRACT INFECTION IN MOTHER DURING FIRST TRIMESTER OF PREGNANCY: Primary | ICD-10-CM

## 2020-05-13 PROBLEM — O23.40 UTI IN PREGNANCY: Status: ACTIVE | Noted: 2020-05-13

## 2020-05-13 LAB
BACTERIA UR CULT: ABNORMAL
DEPRECATED HGB OTHER BLD-IMP: 0 %
HGB A MFR BLD: 2.3 % (ref 1.8–3.2)
HGB A MFR BLD: 97.1 % (ref 96.4–98.8)
HGB C MFR BLD: 0 %
HGB F MFR BLD: 0.6 % (ref 0–2)
HGB FRACT BLD-IMP: NORMAL
HGB S BLD QL SOLY: NEGATIVE
HGB S MFR BLD: 0 %

## 2020-05-13 RX ORDER — NITROFURANTOIN 25; 75 MG/1; MG/1
100 CAPSULE ORAL 2 TIMES DAILY
Qty: 10 CAPSULE | Refills: 0 | Status: SHIPPED | OUTPATIENT
Start: 2020-05-13 | End: 2020-05-18

## 2020-05-21 ENCOUNTER — TELEPHONE (OUTPATIENT)
Dept: PERINATAL CARE | Facility: CLINIC | Age: 30
End: 2020-05-21

## 2020-05-26 ENCOUNTER — TELEPHONE (OUTPATIENT)
Dept: OBGYN CLINIC | Facility: CLINIC | Age: 30
End: 2020-05-26

## 2020-05-27 ENCOUNTER — ROUTINE PRENATAL (OUTPATIENT)
Dept: OBGYN CLINIC | Facility: CLINIC | Age: 30
End: 2020-05-27

## 2020-05-27 VITALS
DIASTOLIC BLOOD PRESSURE: 67 MMHG | BODY MASS INDEX: 30.61 KG/M2 | HEART RATE: 68 BPM | SYSTOLIC BLOOD PRESSURE: 108 MMHG | WEIGHT: 162 LBS | TEMPERATURE: 98.8 F

## 2020-05-27 DIAGNOSIS — B37.9 YEAST INFECTION: ICD-10-CM

## 2020-05-27 DIAGNOSIS — Z34.91 PRENATAL CARE IN FIRST TRIMESTER: ICD-10-CM

## 2020-05-27 DIAGNOSIS — Z3A.10 10 WEEKS GESTATION OF PREGNANCY: Primary | ICD-10-CM

## 2020-05-27 LAB
BV WHIFF TEST VAG QL: NEGATIVE
CLUE CELLS SPEC QL WET PREP: NEGATIVE
PH SMN: 4.5 [PH]
SL AMB POCT WET MOUNT: ABNORMAL
T VAGINALIS VAG QL WET PREP: NEGATIVE
YEAST VAG QL WET PREP: POSITIVE

## 2020-05-27 PROCEDURE — 87591 N.GONORRHOEAE DNA AMP PROB: CPT | Performed by: NURSE PRACTITIONER

## 2020-05-27 PROCEDURE — 87210 SMEAR WET MOUNT SALINE/INK: CPT | Performed by: NURSE PRACTITIONER

## 2020-05-27 PROCEDURE — 99214 OFFICE O/P EST MOD 30 MIN: CPT | Performed by: NURSE PRACTITIONER

## 2020-05-27 PROCEDURE — 87491 CHLMYD TRACH DNA AMP PROBE: CPT | Performed by: NURSE PRACTITIONER

## 2020-05-27 PROCEDURE — 1036F TOBACCO NON-USER: CPT | Performed by: NURSE PRACTITIONER

## 2020-05-27 PROCEDURE — G0145 SCR C/V CYTO,THINLAYER,RESCR: HCPCS | Performed by: NURSE PRACTITIONER

## 2020-05-29 LAB
C TRACH DNA SPEC QL NAA+PROBE: NEGATIVE
N GONORRHOEA DNA SPEC QL NAA+PROBE: NEGATIVE

## 2020-06-03 ENCOUNTER — TELEPHONE (OUTPATIENT)
Dept: OBGYN CLINIC | Facility: CLINIC | Age: 30
End: 2020-06-03

## 2020-06-03 LAB
LAB AP GYN PRIMARY INTERPRETATION: NORMAL
Lab: NORMAL

## 2020-06-04 PROBLEM — Z3A.11 11 WEEKS GESTATION OF PREGNANCY: Status: ACTIVE | Noted: 2020-05-27

## 2020-06-16 ENCOUNTER — TELEPHONE (OUTPATIENT)
Dept: PERINATAL CARE | Facility: CLINIC | Age: 30
End: 2020-06-16

## 2020-06-17 ENCOUNTER — ROUTINE PRENATAL (OUTPATIENT)
Dept: PERINATAL CARE | Facility: OTHER | Age: 30
End: 2020-06-17
Payer: COMMERCIAL

## 2020-06-17 ENCOUNTER — ROUTINE PRENATAL (OUTPATIENT)
Dept: OBGYN CLINIC | Facility: CLINIC | Age: 30
End: 2020-06-17

## 2020-06-17 VITALS
WEIGHT: 160 LBS | HEART RATE: 69 BPM | TEMPERATURE: 98.5 F | HEIGHT: 61 IN | SYSTOLIC BLOOD PRESSURE: 107 MMHG | BODY MASS INDEX: 30.21 KG/M2 | DIASTOLIC BLOOD PRESSURE: 57 MMHG

## 2020-06-17 VITALS
HEART RATE: 76 BPM | TEMPERATURE: 97 F | WEIGHT: 160 LBS | DIASTOLIC BLOOD PRESSURE: 72 MMHG | BODY MASS INDEX: 30.23 KG/M2 | SYSTOLIC BLOOD PRESSURE: 116 MMHG

## 2020-06-17 DIAGNOSIS — O99.210 OBESITY IN PREGNANCY: ICD-10-CM

## 2020-06-17 DIAGNOSIS — Z34.92 PRENATAL CARE IN SECOND TRIMESTER: ICD-10-CM

## 2020-06-17 DIAGNOSIS — Z3A.13 13 WEEKS GESTATION OF PREGNANCY: ICD-10-CM

## 2020-06-17 DIAGNOSIS — O34.219 PREVIOUS CESAREAN DELIVERY, ANTEPARTUM: ICD-10-CM

## 2020-06-17 DIAGNOSIS — Z13.79 GENETIC SCREENING: Primary | ICD-10-CM

## 2020-06-17 DIAGNOSIS — O09.299 PRIOR PREGNANCY COMPLICATED BY IUGR, ANTEPARTUM: ICD-10-CM

## 2020-06-17 DIAGNOSIS — O09.899 HISTORY OF PRETERM DELIVERY, CURRENTLY PREGNANT: ICD-10-CM

## 2020-06-17 DIAGNOSIS — Z3A.13 13 WEEKS GESTATION OF PREGNANCY: Primary | ICD-10-CM

## 2020-06-17 PROCEDURE — 76813 OB US NUCHAL MEAS 1 GEST: CPT | Performed by: OBSTETRICS & GYNECOLOGY

## 2020-06-17 PROCEDURE — 99213 OFFICE O/P EST LOW 20 MIN: CPT | Performed by: OBSTETRICS & GYNECOLOGY

## 2020-06-17 PROCEDURE — 1036F TOBACCO NON-USER: CPT | Performed by: OBSTETRICS & GYNECOLOGY

## 2020-06-17 PROCEDURE — 99214 OFFICE O/P EST MOD 30 MIN: CPT | Performed by: NURSE PRACTITIONER

## 2020-06-17 PROCEDURE — 1036F TOBACCO NON-USER: CPT | Performed by: NURSE PRACTITIONER

## 2020-06-24 ENCOUNTER — TELEPHONE (OUTPATIENT)
Dept: PERINATAL CARE | Facility: CLINIC | Age: 30
End: 2020-06-24

## 2020-06-25 ENCOUNTER — TELEPHONE (OUTPATIENT)
Dept: OBGYN CLINIC | Facility: CLINIC | Age: 30
End: 2020-06-25

## 2020-07-06 PROBLEM — Z3A.15 15 WEEKS GESTATION OF PREGNANCY: Chronic | Status: ACTIVE | Noted: 2020-05-27

## 2020-07-08 ENCOUNTER — TELEPHONE (OUTPATIENT)
Dept: PERINATAL CARE | Facility: CLINIC | Age: 30
End: 2020-07-08

## 2020-07-08 NOTE — TELEPHONE ENCOUNTER
Spoke with patient and confirmed appointment with [facility]  1 support person ( must be over age of 15) may accompany you for your appointment  Are you and your support person able to wear a mask without a valve during entire appointment?yes  [facility] does not allow cell phone use, recording device or streaming during the ultrasound  Check in and rooming questions will be done via phone, when you arrive in the parking lot please call the following inside line # prior to entering office:    Annie Avitia [phone] line: [address] [facility] line:  [phone] line: [phone]  Carla Tori line:  [phone]  [place] line: [phone]    Do you or your support person currently have:  Fever or flu- like symptoms?no  Symptoms of upper respiratory infection like runny nose, sore throat or cough? no  Do you have new headache that you have not had in the past?no  Have you experienced any new shortness of breath recently?no  Do you have any new loss of taste or smell?no  Do you have any new diarrhea, nausea or vomiting?no  Have you recently been in contact with anyone who has been sick or diagnosed with COVID-19 infection?no  Have you been recommended to quarantine because of an exposure to a confirmed positive COVID19 person?no  You and your support person will be screened upon arrival     Patient verbalized understanding of all instructions

## 2020-07-09 ENCOUNTER — TELEPHONE (OUTPATIENT)
Dept: PERINATAL CARE | Facility: CLINIC | Age: 30
End: 2020-07-09

## 2020-07-09 NOTE — TELEPHONE ENCOUNTER
Patient had an appointment today and was running late  I called to confirm she was still coming  She stated she was not coming because she moved to [facility] [place]  I cancelled her appointment

## 2020-07-14 ENCOUNTER — TELEPHONE (OUTPATIENT)
Dept: OBGYN CLINIC | Facility: CLINIC | Age: 30
End: 2020-07-14

## 2020-07-14 NOTE — TELEPHONE ENCOUNTER
Left message on patients phone regarding PN care   Left message on patients phone if she is getting PN care here or if she is transferring her care

## 2020-07-31 ENCOUNTER — TELEPHONE (OUTPATIENT)
Dept: OBGYN CLINIC | Facility: CLINIC | Age: 30
End: 2020-07-31

## 2020-08-03 ENCOUNTER — ROUTINE PRENATAL (OUTPATIENT)
Dept: OBGYN CLINIC | Facility: CLINIC | Age: 30
End: 2020-08-03

## 2020-08-03 ENCOUNTER — ROUTINE PRENATAL (OUTPATIENT)
Dept: PERINATAL CARE | Facility: OTHER | Age: 30
End: 2020-08-03
Payer: COMMERCIAL

## 2020-08-03 VITALS
HEIGHT: 61 IN | WEIGHT: 161.38 LBS | SYSTOLIC BLOOD PRESSURE: 98 MMHG | DIASTOLIC BLOOD PRESSURE: 61 MMHG | TEMPERATURE: 97.3 F | HEART RATE: 73 BPM | BODY MASS INDEX: 30.47 KG/M2

## 2020-08-03 VITALS
BODY MASS INDEX: 30.08 KG/M2 | HEART RATE: 90 BPM | TEMPERATURE: 98 F | DIASTOLIC BLOOD PRESSURE: 71 MMHG | WEIGHT: 159.2 LBS | SYSTOLIC BLOOD PRESSURE: 108 MMHG

## 2020-08-03 DIAGNOSIS — N30.01 ACUTE CYSTITIS WITH HEMATURIA: ICD-10-CM

## 2020-08-03 DIAGNOSIS — O09.299 PRIOR PREGNANCY COMPLICATED BY IUGR, ANTEPARTUM: ICD-10-CM

## 2020-08-03 DIAGNOSIS — Z3A.19 19 WEEKS GESTATION OF PREGNANCY: Primary | ICD-10-CM

## 2020-08-03 DIAGNOSIS — Z34.92 PRENATAL CARE IN SECOND TRIMESTER: ICD-10-CM

## 2020-08-03 DIAGNOSIS — Z36.86 ENCOUNTER FOR ANTENATAL SCREENING FOR CERVICAL LENGTH: ICD-10-CM

## 2020-08-03 DIAGNOSIS — Z36.3 ENCOUNTER FOR ANTENATAL SCREENING FOR MALFORMATIONS: Primary | ICD-10-CM

## 2020-08-03 DIAGNOSIS — Z3A.19 19 WEEKS GESTATION OF PREGNANCY: ICD-10-CM

## 2020-08-03 LAB
BACTERIA UR QL AUTO: ABNORMAL /HPF
BILIRUB UR QL STRIP: NEGATIVE
CLARITY UR: ABNORMAL
COLOR UR: YELLOW
GLUCOSE UR STRIP-MCNC: NEGATIVE MG/DL
HGB UR QL STRIP.AUTO: ABNORMAL
KETONES UR STRIP-MCNC: ABNORMAL MG/DL
LEUKOCYTE ESTERASE UR QL STRIP: ABNORMAL
NITRITE UR QL STRIP: POSITIVE
NON-SQ EPI CELLS URNS QL MICRO: ABNORMAL /HPF
PH UR STRIP.AUTO: 6.5 [PH]
PROT UR STRIP-MCNC: ABNORMAL MG/DL
RBC #/AREA URNS AUTO: ABNORMAL /HPF
SL AMB  POCT GLUCOSE, UA: NEGATIVE
SL AMB LEUKOCYTE ESTERASE,UA: POSITIVE
SL AMB POCT BILIRUBIN,UA: NEGATIVE
SL AMB POCT BLOOD,UA: POSITIVE
SL AMB POCT CLARITY,UA: ABNORMAL
SL AMB POCT COLOR,UA: YELLOW
SL AMB POCT KETONES,UA: POSITIVE
SL AMB POCT NITRITE,UA: POSITIVE
SL AMB POCT PH,UA: NEGATIVE
SL AMB POCT SPECIFIC GRAVITY,UA: 1015
SL AMB POCT URINE PROTEIN: POSITIVE
SL AMB POCT UROBILINOGEN: NEGATIVE
SP GR UR STRIP.AUTO: 1.02 (ref 1–1.03)
UROBILINOGEN UR QL STRIP.AUTO: 0.2 E.U./DL
WBC #/AREA URNS AUTO: ABNORMAL /HPF

## 2020-08-03 PROCEDURE — 1036F TOBACCO NON-USER: CPT | Performed by: NURSE PRACTITIONER

## 2020-08-03 PROCEDURE — 76817 TRANSVAGINAL US OBSTETRIC: CPT | Performed by: OBSTETRICS & GYNECOLOGY

## 2020-08-03 PROCEDURE — 87086 URINE CULTURE/COLONY COUNT: CPT | Performed by: NURSE PRACTITIONER

## 2020-08-03 PROCEDURE — 87077 CULTURE AEROBIC IDENTIFY: CPT | Performed by: NURSE PRACTITIONER

## 2020-08-03 PROCEDURE — 81002 URINALYSIS NONAUTO W/O SCOPE: CPT | Performed by: NURSE PRACTITIONER

## 2020-08-03 PROCEDURE — 76811 OB US DETAILED SNGL FETUS: CPT | Performed by: OBSTETRICS & GYNECOLOGY

## 2020-08-03 PROCEDURE — 81001 URINALYSIS AUTO W/SCOPE: CPT | Performed by: NURSE PRACTITIONER

## 2020-08-03 PROCEDURE — 87186 SC STD MICRODIL/AGAR DIL: CPT | Performed by: NURSE PRACTITIONER

## 2020-08-03 PROCEDURE — 99214 OFFICE O/P EST MOD 30 MIN: CPT | Performed by: NURSE PRACTITIONER

## 2020-08-03 RX ORDER — NITROFURANTOIN 25; 75 MG/1; MG/1
100 CAPSULE ORAL 2 TIMES DAILY
Qty: 14 CAPSULE | Refills: 0 | Status: SHIPPED | OUTPATIENT
Start: 2020-08-03 | End: 2020-08-10

## 2020-08-03 NOTE — PROGRESS NOTES
[address]: Ms Yusuf Never was seen today at 19w6d for anatomic survey and cervical length screening ultrasound  See ultrasound report under "OB Procedures" tab  Please don't hesitate to contact our office with any concerns or questions    Juancho Lisa MD

## 2020-08-03 NOTE — PATIENT INSTRUCTIONS
Thank you for choosing us for your  care today  If you have any questions about your ultrasound or care, please do not hesitate to contact us or your primary obstetrician  Some general instructions for your pregnancy are:     Exercise: Aim for 22 minutes per day (150 minutes per week!) of regular exercise  This is obviously hard to do during a pandemic but walking is great   Nutrition: aim for calcium-rich and iron-rich foods as well as healthy sources of protein  This will help you gain a healthy amount of weight   Protect against the flu: get yourself and your entire household vaccinated against influenza   Protect against coronavirus: practice social distancing, wear a mask in public, and wash your hands often  This virus can be spread easily by people without symptoms  Notify your primary care doctor if you have any symptoms including cough, shortness of breath or difficulty breathing, fever, chills, muscle pain, sore throat, or new loss of taste or smell   Learn about Preeclampsia: preeclampsia is a common, serious complication in pregnancy  A blood pressure of 140mmHg (top number or systolic) OR 75ZQJQ (bottom number or diastolic) is not normal and needs evaluation by your doctor   If you smoke, try to reduce how many cigarettes you smoke or quit completely  Do not vape   Other warning signs to watch out for in pregnancy or postpartum: chest pain, obstructed breathing or shortness of breath, seizures, thoughts of hurting yourself or your baby, bleeding, a painful or swollen leg, fever, or headache ([facility] POST-BIRTH Warning Signs campaign)  If these happen call 911  Itching is also not normal in pregnancy and if you experience this, especially over your hands and feet, potentially worse at night, notify your doctors

## 2020-08-03 NOTE — PATIENT INSTRUCTIONS
WARNING SIGNS DURING PREGNANCY  Call our office at [phone] for any of the followin  Vaginal bleeding  2  Sharp abdominal pain that does not go away  3  Fever (more than 100 4 and is not relieved by Tylenol)  4  Persistent vomiting lasting greater than 24 hours  5  Chest pain   6  Pain or burning when you urinate  7  Severe headache that doesn't resolve with Tylenol  8  Blurred vision or seeing spots in your vision  9  Sudden swelling of your face or hands  10  Redness, swelling or pain in a leg  11  A sudden weight gain in just a few days  12  Decrease in your baby's movement (after 28 weeks or the 6th month of pregnancy)  13  A loss of watery fluid from your vagina - can be a gush, a trickle or continuous wetness  14  After 20 weeks of pregnancy, rhythmic cramping (greater than 4 per hour) or menstrual like low/pelvic pain  Coronavirus (COVID-19) pregnancy FAQs: Medical experts answer your questions  From [employer] cy  com/0_coronavirus-covid-19-pregnancy-faq-medical-kmfmsiw-wlcvxb-mi_45731024  bc (courtesy of [facility])  As of 3/18/20  By Sally Wilson 41  Medically reviewed by Society for Maternal-Fetal Medicine       We asked parents-to-be to send us their most pressing questions about coronavirus (COVID-19)  Among them: Is it still safe to deliver in a hospital? What if my ob-gyn has the virus? We sent those questions to the top docs at the Society for Maternal-Fetal Medicine  Here are their answers  The coronavirus (COVID-19) pandemic has been declared a national emergency in the [facility] by [employer]  Moms-to-be like you are concerned about everything from getting medicines to managing disruptions at work  But above and beyond any worry about lifestyle changes is a focus on your health and the impact of COVID-19 on your pregnancy  We asked obstetrics doctors who handle the most complicated pregnancy issues to answer your questions about the coronavirus  Here are their responses, provided by Dr Francisco Gu and her colleagues at the Society for [employer]  Am I at more risk for COVID-19 if I'm pregnant? We don't know  Pregnancy does change your immune system in ways that might make you more susceptible to viral respiratory infections like COVID-19  If you become infected, you might also be at higher risk for more severe illness compared to the general population  Although this does not appear to be the case with COVID-19, based on limited data so far, a higher risk has been true for pregnant women with other coronavirus infections (SARS-CoV and MERS-CoV) and other viral respiratory infections, such as flu  It's important to take preventive actions to avoid infection, such as washing your hands often and avoiding people who are sick  How might coronavirus affect my pregnancy? Again, we don't know  Women with other coronavirus infections (such as SARS-CoV) did not have miscarriage or stillbirth at higher rates than the general population  We know that having other respiratory viral infections during pregnancy, such as flu, has been associated with problems like low birth weight and  birth  Also, having a high fever early in pregnancy may increase the risk of certain birth defects  Could I transmit coronavirus to my baby during pregnancy or delivery? We don't know whether you could transmit COVID-19 to your baby before or during delivery  However, among the few case studies of infants born to mothers with [phone] published in peer-reviewed literature, none of the infants tested positive for the virus  Also, there was no virus detected in samples of amniotic fluid or breast milk  There have been a few reports of newborns as young as a few days old with infection, suggesting that a mother can transmit the infection to her infant through close contact after delivery    There have been no reports of mother-to-baby transmission for other coronaviruses (MERS-CoV and SARS-CoV), although only limited information is available  Is it safe for me to deliver at a hospital where there have been COVID-19 cases? Yes  We know that COVID-19 is a very scary virus  The good news is that hospitals are taking great precautions to keep patients and healthcare providers safe  When a patient is even suspected to have COVID-19, they are placed in a negative pressure room  (Think of these rooms as vacuums that suck and filter the air so it's safe for the other people in the hospital)  This makes it possible for you to deliver at the hospital without putting you or your baby at risk  Hospitals are also implementing stricter visiting policies to keep patients safe  It's worth calling your hospital to check if there are any new regulations to be aware of  What plans should I make now in case the hospital system is overwhelmed when it's time for me to deliver? This is a great question to talk with your doctor or midwife about when you're 34 to 36 weeks pregnant  Every hospital is making different plans for dealing with this scenario  I work in healthcare  Should I ask my doctor to excuse me from work until the baby is born? What if I work in a school, the travel [address], or some other high-risk setting? Healthcare facilities should take care to limit the exposure of pregnant employees to patients with confirmed or suspected COVID-19, just as they would with other infectious cases  If you continue working, be sure to follow the CDC's risk assessment and infection control guidelines  If you work in a school, [employer], or other high-risk setting, talk with your employer about what it's doing to protect employees and minimize infection risks  Wash your hands often  What if my OB gets COVID-19?   If your doctor or midwife tests positive for COVID-19, they will need to be quarantined until they recover and are no longer at risk of transmitting the virus  In this case, you'll be assigned to another OB in your doctor's practice (or you may choose another practitioner yourself)  Ask your new OB or your doctor's office if you should self-quarantine or be tested for the virus  (It will depend on when you last saw your provider and when that person tested positive )    Should we hold off on trying to conceive because of COVID-19? At this time, there's no reason to hold off on trying to get pregnant, but the data we have is really limited  For example, we don't think the virus causes birth defects or increases your risk of miscarriage  But we don't know whether you could transmit COVID-19 to your baby before or during delivery  We also don't know if the virus lives in semen or can be sexually transmitted  We have a babymoon scheduled in the next few months - should we cancel? If you're planning to travel internationally or on a cruise, you should strongly consider canceling  At this time, the virus has reached more than 140 countries, and there are travel bans to [place], most of [place], and [place]  Places where large numbers of people gather are at highest risk, especially airports and cruise ships  If you're planning travel in the [place] , note that any travel setting increases your risk of exposure, and there may be travel bans even in [place] by the time you're scheduled to go  Even if you're state is not blocked, you'll definitely want to avoid traveling to communities where the virus is spreading  To find out where these places are, check The [place] Times map based on CDC data  For the most current advice to help you avoid exposure, check the CDC's COVID-19 travel page  Will the hospital separate me from my  and keep the baby in quarantine?   If you test positive for COVID-19 or have been exposed but have no symptoms, the CDC, [employer] of Obstetricians and Gynecologists, and the Society for [employer] all recommend that you be  from your baby to decrease the risk of transmission to the baby  This would last until you are no longer at risk of transmitting the virus  If you do not have COVID-19 and have not been exposed to the virus, the hospital will not separate you from your   My hospital is restricting visitors and only allowing one support person  If my support person leaves after the delivery, will they be allowed to come back? Every hospital has different policies  Contact your hospital or labor and delivery unit a week or so before delivery to get the most up-to-date restrictions  In general, if your support person needs to leave, they would be allowed back unless they knew they were exposed to COVID-19 after leaving your company  [facility] understands that the coronavirus (COVID-19) pandemic is an evolving story and that your questions will change over time  We'll continue asking moms and dads in our Community what they want to know, and we'll get the answers from experts to keep them - and you - informed and supported  My mom was planning to fly here to help me care for my new baby after delivery  Should I tell her not to come? Yes  If your mom is over 61 or has any serious chronic medical conditions (such as heart disease, lung disease, or diabetes), she is at higher risk of serious illness from COVID-19 and should avoid air travel  And remember that any travel setting increases a person's risk of exposure  So, it may be risky to have her around the baby after she has been traveling  For the most current advice on traveling, check the CDC's COVID-19 travel page  [facility] understands that the coronavirus pandemic is an evolving story and that your questions will change over time  We'll continue asking moms and dads in our Community what they want to know, and we'll get the answers from experts to keep them - and you - informed and supported      Take Macrobid as directed  Increase water intake  Make appointment for Level II Ultrasound  Return tomorrow for Marybeth Clarke in 1 week for [facility] and prenatal visit

## 2020-08-03 NOTE — PROGRESS NOTES
Assessment & Plan  34 y o  [id number] at 19w6d presenting for routine prenatal visit  Urine dip positive for UTI, safe and effective use of Macrobid provided, encouraged to increase water intake  Transferred care back from [facility] Friday, prescription transferred back here, to return tomorrow for [facility]  Provided referral for Level II U/S  WNL respiratory effort  Negative fever, cough or SOB    Problem List Items Addressed This Visit        Genitourinary    Acute cystitis with hematuria    Relevant Medications    nitrofurantoin (MACROBID) 100 mg capsule       Other    19 weeks gestation of pregnancy - Primary    Relevant Medications    nitrofurantoin (MACROBID) 100 mg capsule    Prenatal care in second trimester        ____________________________________________________________  Subjective  She is without complaint  She denies contractions, loss of fluid, or vaginal bleeding  She feels regular fetal movements      Objective  /71   Pulse 90   Temp 98 °F (36 7 °C)   Wt 72 2 kg (159 lb 3 2 oz)   LMP 03/17/2020 (Exact Date)   BMI 30 08 kg/m²          Patient's Active Problem List  Patient Active Problem List   Diagnosis    Fetal ultrasound    Genetic screening    Contraception management    Previous c/s x3    Prior IUGR    History of PTD @32 weeks    Obesity in pregnancy    UTI in pregnancy    19 weeks gestation of pregnancy    Prenatal care in second trimester    Acute cystitis with hematuria     Plan  Take Macrobid as directed  Increase water intake  Make appointment for Level II Ultrasound  Return tomorrow for [facility]  To call with vaginal bleeding, loss of fluid, regular contractions, other needs or concerns    Retrurn in 1 week for [facility] and prenatal visit

## 2020-08-05 LAB — BACTERIA UR CULT: ABNORMAL

## 2020-08-06 ENCOUNTER — TELEPHONE (OUTPATIENT)
Dept: PERINATAL CARE | Facility: CLINIC | Age: 30
End: 2020-08-06

## 2020-08-07 ENCOUNTER — TELEPHONE (OUTPATIENT)
Dept: OBGYN CLINIC | Facility: CLINIC | Age: 30
End: 2020-08-07

## 2020-08-10 ENCOUNTER — TELEPHONE (OUTPATIENT)
Dept: UROLOGY | Facility: MEDICAL CENTER | Age: 30
End: 2020-08-10

## 2020-08-10 ENCOUNTER — TELEPHONE (OUTPATIENT)
Dept: OBGYN CLINIC | Facility: CLINIC | Age: 30
End: 2020-08-10

## 2020-08-10 ENCOUNTER — ROUTINE PRENATAL (OUTPATIENT)
Dept: OBGYN CLINIC | Facility: CLINIC | Age: 30
End: 2020-08-10

## 2020-08-10 VITALS
TEMPERATURE: 97.6 F | DIASTOLIC BLOOD PRESSURE: 71 MMHG | WEIGHT: 156.4 LBS | BODY MASS INDEX: 29.53 KG/M2 | HEART RATE: 94 BPM | SYSTOLIC BLOOD PRESSURE: 127 MMHG | HEIGHT: 61 IN

## 2020-08-10 DIAGNOSIS — N13.30 HYDRONEPHROSIS, UNSPECIFIED HYDRONEPHROSIS TYPE: Primary | ICD-10-CM

## 2020-08-10 DIAGNOSIS — Z3A.20 20 WEEKS GESTATION OF PREGNANCY: ICD-10-CM

## 2020-08-10 DIAGNOSIS — O09.899 HISTORY OF PRETERM DELIVERY, CURRENTLY PREGNANT: ICD-10-CM

## 2020-08-10 PROCEDURE — 99213 OFFICE O/P EST LOW 20 MIN: CPT | Performed by: OBSTETRICS & GYNECOLOGY

## 2020-08-10 PROCEDURE — 3008F BODY MASS INDEX DOCD: CPT | Performed by: OBSTETRICS & GYNECOLOGY

## 2020-08-10 PROCEDURE — 1036F TOBACCO NON-USER: CPT | Performed by: OBSTETRICS & GYNECOLOGY

## 2020-08-10 PROCEDURE — 96372 THER/PROPH/DIAG INJ SC/IM: CPT

## 2020-08-10 NOTE — TELEPHONE ENCOUNTER
Patient is 21 weeks pregnant and was seen in the ER on 08/08/2020 for kidney stones  Scheduled patient at [facility] 09/08/2020 patient is currently still having pain  Please advise is this is an appropriate appointment? Patient also only speaks Chinese      Complaint/diagnosis:Hydronephrosis    Insurance: [employer]     History of Cancer: no    Previous Urologist:no    Outside testing/where:no    Records requested/where:no    Preferred Location:

## 2020-08-10 NOTE — TELEPHONE ENCOUNTER
Patient called stating that she was at the ER two days ago, she went to an ER in [place]  And they told her she had Kidney Stones  Patient has appt today     Serbian only

## 2020-08-10 NOTE — PROGRESS NOTES
Dayton Blank presents today for routine OB visit at [place] [address]  Blood Pressure: 127/71  Wt=70 9 kg (156 lb 6 4 oz); Body mass index is 29 55 kg/m² ; TWG=-2 54 kg (-5 lb 9 6 oz)  Fetal Heart Rate: 136; Fundal Height (cm): 22 cm  Urine dip: no specimen  Abdomen: gravid, soft, non-tender  She reports R flank pain- see US report  Pt has hydronephrosis and a renal cyst   Denies uterine contractions or persistent cramping  Denies vaginal bleeding or leaking of fluid  Reports fetal movement  Scheduled for next ultrasound needs appt  Reviewed common discomforts of pregnancy in second trimester and warning signs  Advised to continue medications and return in 4 weeks        Current Outpatient Medications:     miconazole (MONISTAT-7) 2 % vaginal cream, Insert 1 applicator into the vagina daily at bedtime (Patient not taking: Reported on 6/17/2020), Disp: 45 g, Rfl: 0    Prenatal Vit-Fe Fumarate-FA (PRENATAL VITAMIN) 27-0 8 MG TABS, Take 1 tablet by mouth daily, Disp: 90 tablet, Rfl: 4      G4 Problems (from 04/29/20 to present)     Problem Noted Resolved    UTI in pregnancy 5/13/2020 by THAI Hill No    Overview Signed 5/13/2020  8:54 AM by THAI Hill     Noted with E  Coli 5/11/20 - given Rx Macrobid 5/13/20         Fetal ultrasound 4/29/2020 by THAI Hill No    Overview Signed 4/29/2020  3:40 PM by THAI Hill     Desires Sequential Screen         Genetic screening 4/29/2020 by THAI Hill No    Overview Signed 4/29/2020  3:40 PM by THAI Hill     Desires Sequential Screen         Contraception management 4/29/2020 by THAI Hill No    Overview Signed 4/29/2020  3:41 PM by Jody Suarez [address] [place] [address] surgical sterilization  Needs to sign consent @28 weeks         Previous c/s x3 4/29/2020 by THAI Hill No    Overview Signed 4/29/2020  3:44 PM by THAI Hill     Need records  Needs repeat c/s @39 weeks         Prior IUGR 4/29/2020 by THAI Poole No    Overview Signed 4/29/2020  3:44 PM by THAI Poole     Serial growth scans         History of PTD @32 weeks 4/29/2020 by THAI Poole No    Overview Signed 4/29/2020  3:51 PM by THAI Poole     [facility] (had [address] in previous 2 pregnancies)  Needs serial CL measurements         Obesity in pregnancy 4/29/2020 by THAI Poole No    Overview Addendum 5/13/2020  8:51 AM by THAI Poole     Pre-pregnant BMI=30 6  Needs early GDM screen - done WNL  Serial growth scans

## 2020-08-12 ENCOUNTER — APPOINTMENT (OUTPATIENT)
Dept: ULTRASOUND IMAGING | Facility: HOSPITAL | Age: 30
DRG: 561 | End: 2020-08-12
Payer: COMMERCIAL

## 2020-08-12 ENCOUNTER — HOSPITAL ENCOUNTER (INPATIENT)
Facility: HOSPITAL | Age: 30
LOS: 1 days | Discharge: HOME | DRG: 561 | End: 2020-08-15
Attending: EMERGENCY MEDICINE | Admitting: OBSTETRICS & GYNECOLOGY
Payer: COMMERCIAL

## 2020-08-12 DIAGNOSIS — O23.02 PYELONEPHRITIS AFFECTING PREGNANCY IN SECOND TRIMESTER: Primary | ICD-10-CM

## 2020-08-12 PROBLEM — Z3A.21 21 WEEKS GESTATION OF PREGNANCY: Status: ACTIVE | Noted: 2020-08-12

## 2020-08-12 LAB
ALBUMIN SERPL BCP-MCNC: 2.3 G/DL (ref 3.5–5)
ALP SERPL-CCNC: 94 U/L (ref 46–116)
ALT SERPL W P-5'-P-CCNC: 19 U/L (ref 12–78)
ANION GAP SERPL CALCULATED.3IONS-SCNC: 12 MMOL/L (ref 4–13)
AST SERPL W P-5'-P-CCNC: 18 U/L (ref 5–45)
BACTERIA UR QL AUTO: ABNORMAL /HPF
BASOPHILS # BLD AUTO: 0.01 THOUSANDS/ΜL (ref 0–0.1)
BASOPHILS NFR BLD AUTO: 0 % (ref 0–1)
BILIRUB SERPL-MCNC: 0.42 MG/DL (ref 0.2–1)
BILIRUB UR QL STRIP: NEGATIVE
BUN SERPL-MCNC: 5 MG/DL (ref 5–25)
CALCIUM SERPL-MCNC: 8.5 MG/DL (ref 8.3–10.1)
CHLORIDE SERPL-SCNC: 100 MMOL/L (ref 100–108)
CLARITY UR: ABNORMAL
CO2 SERPL-SCNC: 21 MMOL/L (ref 21–32)
COLOR UR: YELLOW
CREAT SERPL-MCNC: 0.67 MG/DL (ref 0.6–1.3)
EOSINOPHIL # BLD AUTO: 0 THOUSAND/ΜL (ref 0–0.61)
EOSINOPHIL NFR BLD AUTO: 0 % (ref 0–6)
ERYTHROCYTE [DISTWIDTH] IN BLOOD BY AUTOMATED COUNT: 13.4 % (ref 11.6–15.1)
GFR SERPL CREATININE-BSD FRML MDRD: 120 ML/MIN/1.73SQ M
GLUCOSE P FAST SERPL-MCNC: 94 MG/DL (ref 65–99)
GLUCOSE SERPL-MCNC: 94 MG/DL (ref 65–140)
GLUCOSE UR STRIP-MCNC: NEGATIVE MG/DL
HCT VFR BLD AUTO: 32.6 % (ref 34.8–46.1)
HGB BLD-MCNC: 10.9 G/DL (ref 11.5–15.4)
HGB UR QL STRIP.AUTO: ABNORMAL
IMM GRANULOCYTES # BLD AUTO: 0.05 THOUSAND/UL (ref 0–0.2)
IMM GRANULOCYTES NFR BLD AUTO: 1 % (ref 0–2)
KETONES UR STRIP-MCNC: ABNORMAL MG/DL
LACTATE SERPL-SCNC: 1 MMOL/L (ref 0.5–2)
LEUKOCYTE ESTERASE UR QL STRIP: NEGATIVE
LYMPHOCYTES # BLD AUTO: 0.49 THOUSANDS/ΜL (ref 0.6–4.47)
LYMPHOCYTES NFR BLD AUTO: 5 % (ref 14–44)
MCH RBC QN AUTO: 29.6 PG (ref 26.8–34.3)
MCHC RBC AUTO-ENTMCNC: 33.4 G/DL (ref 31.4–37.4)
MCV RBC AUTO: 89 FL (ref 82–98)
MONOCYTES # BLD AUTO: 0.58 THOUSAND/ΜL (ref 0.17–1.22)
MONOCYTES NFR BLD AUTO: 6 % (ref 4–12)
NEUTROPHILS # BLD AUTO: 9.38 THOUSANDS/ΜL (ref 1.85–7.62)
NEUTS SEG NFR BLD AUTO: 88 % (ref 43–75)
NITRITE UR QL STRIP: POSITIVE
NON-SQ EPI CELLS URNS QL MICRO: ABNORMAL /HPF
NRBC BLD AUTO-RTO: 0 /100 WBCS
PH UR STRIP.AUTO: 6 [PH]
PLATELET # BLD AUTO: 208 THOUSANDS/UL (ref 149–390)
PMV BLD AUTO: 10.6 FL (ref 8.9–12.7)
POTASSIUM SERPL-SCNC: 3 MMOL/L (ref 3.5–5.3)
PROT SERPL-MCNC: 6.7 G/DL (ref 6.4–8.2)
PROT UR STRIP-MCNC: ABNORMAL MG/DL
RBC # BLD AUTO: 3.68 MILLION/UL (ref 3.81–5.12)
RBC #/AREA URNS AUTO: ABNORMAL /HPF
SARS-COV-2 RNA RESP QL NAA+PROBE: NEGATIVE
SODIUM SERPL-SCNC: 133 MMOL/L (ref 136–145)
SP GR UR STRIP.AUTO: 1.02 (ref 1–1.03)
UROBILINOGEN UR QL STRIP.AUTO: 0.2 E.U./DL
WBC # BLD AUTO: 10.51 THOUSAND/UL (ref 4.31–10.16)
WBC #/AREA URNS AUTO: ABNORMAL /HPF

## 2020-08-12 PROCEDURE — 85025 COMPLETE CBC W/AUTO DIFF WBC: CPT | Performed by: STUDENT IN AN ORGANIZED HEALTH CARE EDUCATION/TRAINING PROGRAM

## 2020-08-12 PROCEDURE — 87040 BLOOD CULTURE FOR BACTERIA: CPT | Performed by: OBSTETRICS & GYNECOLOGY

## 2020-08-12 PROCEDURE — 81001 URINALYSIS AUTO W/SCOPE: CPT | Performed by: STUDENT IN AN ORGANIZED HEALTH CARE EDUCATION/TRAINING PROGRAM

## 2020-08-12 PROCEDURE — 83605 ASSAY OF LACTIC ACID: CPT | Performed by: OBSTETRICS & GYNECOLOGY

## 2020-08-12 PROCEDURE — 87077 CULTURE AEROBIC IDENTIFY: CPT | Performed by: STUDENT IN AN ORGANIZED HEALTH CARE EDUCATION/TRAINING PROGRAM

## 2020-08-12 PROCEDURE — 99221 1ST HOSP IP/OBS SF/LOW 40: CPT | Performed by: OBSTETRICS & GYNECOLOGY

## 2020-08-12 PROCEDURE — 76770 US EXAM ABDO BACK WALL COMP: CPT

## 2020-08-12 PROCEDURE — 80053 COMPREHEN METABOLIC PANEL: CPT | Performed by: STUDENT IN AN ORGANIZED HEALTH CARE EDUCATION/TRAINING PROGRAM

## 2020-08-12 PROCEDURE — 87635 SARS-COV-2 COVID-19 AMP PRB: CPT | Performed by: OBSTETRICS & GYNECOLOGY

## 2020-08-12 PROCEDURE — 87186 SC STD MICRODIL/AGAR DIL: CPT | Performed by: STUDENT IN AN ORGANIZED HEALTH CARE EDUCATION/TRAINING PROGRAM

## 2020-08-12 PROCEDURE — 87086 URINE CULTURE/COLONY COUNT: CPT | Performed by: STUDENT IN AN ORGANIZED HEALTH CARE EDUCATION/TRAINING PROGRAM

## 2020-08-12 PROCEDURE — 87491 CHLMYD TRACH DNA AMP PROBE: CPT | Performed by: OBSTETRICS & GYNECOLOGY

## 2020-08-12 PROCEDURE — 87591 N.GONORRHOEAE DNA AMP PROB: CPT | Performed by: OBSTETRICS & GYNECOLOGY

## 2020-08-12 RX ORDER — ACETAMINOPHEN 325 MG/1
975 TABLET ORAL EVERY 6 HOURS PRN
Status: DISCONTINUED | OUTPATIENT
Start: 2020-08-12 | End: 2020-08-13

## 2020-08-12 RX ORDER — SODIUM CHLORIDE 9 MG/ML
125 INJECTION, SOLUTION INTRAVENOUS CONTINUOUS
Status: DISCONTINUED | OUTPATIENT
Start: 2020-08-12 | End: 2020-08-15 | Stop reason: HOSPADM

## 2020-08-12 RX ORDER — SODIUM CHLORIDE, SODIUM LACTATE, POTASSIUM CHLORIDE, CALCIUM CHLORIDE 600; 310; 30; 20 MG/100ML; MG/100ML; MG/100ML; MG/100ML
125 INJECTION, SOLUTION INTRAVENOUS CONTINUOUS
Status: DISCONTINUED | OUTPATIENT
Start: 2020-08-12 | End: 2020-08-12

## 2020-08-12 RX ORDER — POTASSIUM CHLORIDE 20 MEQ/1
40 TABLET, EXTENDED RELEASE ORAL ONCE
Status: COMPLETED | OUTPATIENT
Start: 2020-08-12 | End: 2020-08-12

## 2020-08-12 RX ORDER — ACETAMINOPHEN 325 MG/1
975 TABLET ORAL EVERY 6 HOURS PRN
Status: DISCONTINUED | OUTPATIENT
Start: 2020-08-12 | End: 2020-08-12

## 2020-08-12 RX ORDER — SODIUM CHLORIDE 9 MG/ML
125 INJECTION, SOLUTION INTRAVENOUS CONTINUOUS
Status: DISCONTINUED | OUTPATIENT
Start: 2020-08-12 | End: 2020-08-12 | Stop reason: SDUPTHER

## 2020-08-12 RX ADMIN — ACETAMINOPHEN 975 MG: 325 TABLET ORAL at 10:13

## 2020-08-12 RX ADMIN — SODIUM CHLORIDE 1000 ML/HR: 0.9 INJECTION, SOLUTION INTRAVENOUS at 13:39

## 2020-08-12 RX ADMIN — POTASSIUM CHLORIDE 40 MEQ: 1500 TABLET, EXTENDED RELEASE ORAL at 13:48

## 2020-08-12 RX ADMIN — CEFTRIAXONE SODIUM 1000 MG: 10 INJECTION, POWDER, FOR SOLUTION INTRAVENOUS at 13:39

## 2020-08-12 RX ADMIN — SODIUM CHLORIDE 125 ML/HR: 0.9 INJECTION, SOLUTION INTRAVENOUS at 19:48

## 2020-08-12 RX ADMIN — SODIUM CHLORIDE 125 ML/HR: 0.9 INJECTION, SOLUTION INTRAVENOUS at 15:31

## 2020-08-12 RX ADMIN — ACETAMINOPHEN 975 MG: 325 TABLET ORAL at 23:05

## 2020-08-12 RX ADMIN — ACETAMINOPHEN 975 MG: 325 TABLET ORAL at 17:39

## 2020-08-12 NOTE — SOCIAL WORK
Call received that LEYDI is here 21w GA as obs  - she may or may not need to be admitted inpatient for treatment of polynephritis  She is here with her three children:     1  Luis Garcia - 10yo male    2  Anel Alvarado - 3 1/1 yo male    3  Jose Alfredo Boyle - 5yo female    She reported to nursing staff that she has no supports to assist with childcare  CM met with MOB to address, per the chart, MOB's preferred language is Danish, CM asked the patient if she would like to use a Danish interpretor line and she stated "no I speak english" -     She just recently moved back to the  from the [facility] [place]  She reports she and the FOB of the current pregnancy have a chavo relationship  She does not feel that she can rely on him to take care of the children and he is not an option  The fathers for the other children are not involved  A grandmother for the 5yo may be able to take her at some point but can not take the boys, as they are not her grandchildren  She is unsure IF the grandmother can come, or when she may be here to take the 4yo  The 4yo boy is primarily tube fed, q6hrs, she reports he last was fed at noon, he would be due for another feed at 6pm  She is the only caretaker for his TF  Discussed with the PA Student Thalia Del Toro that possible plan is for MOB to continue to be observation and be treated with a dose of ceftriaxone - if able, will be discharged later today, goal is to not move toward an inpatient admission  If an inpatient admission is necessary, there is concern for adequate childcare while MOB is medically treated  Mom reports she had not yet identified any plan for later in pregnancy when she is due to deliver in regards to childcare for these three children  Called childline, spoke with Leighton Rose #[phone] - case to be assigned to [facility] [place] - will continue to update them on patient admission with the hopes that they do not need to be involved with childcare   Notified childline that we DO NOT have a pediatric unit at [facility]  Cm following

## 2020-08-12 NOTE — QUICK NOTE
Notified by nursing that patient has a temperature of 100 4F  Patient noted to be wrapped in multiple blankets and with aqua K pad at the time of elevated temperature, but she feels very warm to touch  At bedside, patient reports that she is feeling the same as she did when she was admitted  She still endorses right CVA tenderness and has nausea with no vomiting  No fundal tenderness  Patient is tachycardic into 120bpm  FHT tachycardia also noted    A/P:  31y/o [id number] at 21w1d with fever, CVA tenderness and h/o of pyelonephritis that presents with probable pyelonephritis   Less likley diagnosis is chorioamnionitis    - IV fluid bolus  - Blood cultures to be collected now  - COVID testing  - lactic acid  - Continue to monitor closely    D/w Dr Wilson Luevano MD, PGY-3  8/12/2020  6:34 PM

## 2020-08-12 NOTE — H&P
History & Physical - OB/GYN   Larry Palafox 31 y o  female MRN: [id number]  Unit/Bed#: L&D 324-01 Encounter: [id number]    Larry Palafox is a patient of [facility]    Chief complaint:  "I have back pain"    HPI:  Larry Palafox is a 31 y o  [id number] female with an NATIVIDAD of 2020, by Last Menstrual Period at 21w1d weeks gestation who is being admitted for Observation for possible right sided pyelonephritis versus recurrent cystitis with concerns for right nephrolithiasis  She complains of new onset back pain for the last 9 days  It is a 6/10 and located in the right upper back  She also has had a headache  She was treated with a course of Macrobid for a symptomatic UTI on 8/3/20   She has a history of pyelonephritis and kidney stones in the past  She was recently seen at [facility]     Vitals:    20 1532   BP: 138/61   Pulse: (!) 120   Resp:    Temp:    SpO2:        Contractions:  none  Fetal movement:  present  Vaginal bleeding:   none  Leaking of fluid:  none    Pregnancy Complications:  Patient Active Problem List   Diagnosis    Fetal ultrasound    Genetic screening    Contraception management    Previous c/s x3    Prior IUGR    History of PTD @32 weeks    Obesity in pregnancy    UTI in pregnancy    Prenatal care in second trimester    Acute cystitis with hematuria    21 weeks gestation of pregnancy       PMH:  Past Medical History:   Diagnosis Date    Chlamydia        PSH:  Past Surgical History:   Procedure Laterality Date     SECTION  , ,        Social Hx:  Denies EtOH, cigarette, and recreational drug use in pregnancy    OB Hx:  OB History    Para Term  AB Living   4 3 2 1 0 3   SAB TAB Ectopic Multiple Live Births   0 0 0 0 3      # Outcome Date GA Lbr Michele/2nd Weight Sex Delivery Anes PTL Lv   4 Current            3 Term 17 38w0d  2608 g (5 lb 12 oz) F CS-Unspec  N ROBERTO      Birth Comments: FOB2   2 Term 12/11/15 38w0d  2155 g (4 lb 12 oz) M CS-Unspec  N ROBERTO      Birth Comments: FOB1      Complications: Failed TOLAC   1  12 32w0d  1644 g (3 lb 10 oz) M CS-Unspec EPI Y ROBERTO      Birth Comments: FOB1      Complications: Failure to Progress in First Stage,  premature rupture of membranes       Meds:  No current facility-administered medications on file prior to encounter  Current Outpatient Medications on File Prior to Encounter   Medication Sig Dispense Refill    miconazole (MONISTAT-7) 2 % vaginal cream Insert 1 applicator into the vagina daily at bedtime (Patient not taking: Reported on 2020) 45 g 0    Prenatal Vit-Fe Fumarate-FA (PRENATAL VITAMIN) 27-0 8 MG TABS Take 1 tablet by mouth daily 90 tablet 4       Allergies: Allergies   Allergen Reactions    Meperidine Rash       Review of Systems   Constitutional: Negative for fatigue and fever  Respiratory: Negative for cough, shortness of breath and wheezing  Cardiovascular: Negative for chest pain and palpitations  Gastrointestinal: Negative for abdominal pain, diarrhea, nausea and vomiting  Genitourinary: Positive for flank pain  Negative for genital sores, hematuria, pelvic pain, vaginal bleeding and vaginal discharge  Musculoskeletal: Negative for back pain  Neurological: Negative for headaches  Physical Exam  Vitals signs reviewed  Constitutional:       Appearance: She is well-developed  HENT:      Head: Normocephalic and atraumatic  Nose: Nose normal    Eyes:      Pupils: Pupils are equal, round, and reactive to light  Neck:      Musculoskeletal: Normal range of motion  Vascular: No JVD  Cardiovascular:      Rate and Rhythm: Normal rate and regular rhythm  Heart sounds: Normal heart sounds  No murmur  No friction rub  No gallop  Pulmonary:      Effort: Pulmonary effort is normal  No respiratory distress  Breath sounds: Normal breath sounds  No stridor  No wheezing or rales     Abdominal:      General: Bowel sounds are normal  There is no distension  Palpations: Abdomen is soft  Tenderness: There is no abdominal tenderness  There is no guarding or rebound  Comments: gravid   Musculoskeletal: Normal range of motion  Skin:     General: Skin is warm and dry  Coloration: Skin is not pale  Findings: No rash  Neurological:      Mental Status: She is alert and oriented to person, place, and time  Psychiatric:         Behavior: Behavior normal          Thought Content:  Thought content normal          Judgment: Judgment normal          Cervix:   deferred    Fetal heart rate:   Baseline Rate: 175 bpm  Variability: Moderate 6-25 bpm  Decelerations: None    [place]:   Contraction Frequency (minutes): 0    Labs:  Lab Results   Component Value Date    WBC 10 51 (H) 2020    HGB 10 9 (L) 2020    HCT 32 6 (L) 2020    MCV 89 2020     2020     Lab Results   Component Value Date    SODIUM 133 (L) 2020    K 3 0 (L) 2020     2020    CO2 21 2020    AGAP 12 2020    BUN 5 2020    CREATININE 0 67 2020    GLUC 94 2020    GLUF 94 2020    CALCIUM 8 5 2020    AST 18 2020    ALT 19 2020    ALKPHOS 94 2020    TP 6 7 2020    TBILI 0 42 2020    EGFR 120 2020       Assessment:   31 y o  [id number] at 21w1d admit for observation and treatment with IV antibiotics for suspected pyelonephritis vs nephrolithaisis vs recurrent cystitis    Plan:   IUP at 21w1d   - Intermitent FHR and tocometry monitoring  Possible pyelo vs nephrolithasis vs recurrent cystitis   - F/u cultures, afebrile, fluids, kidney US, 1g ceftriaxone, trend white count  Hx of chlamydia   - Repeat GC/CT   labor workup performed   - CL 3 5cm, no dynamic changes   - Wet mount, KOH negative  Routine antepartum labs   - CBC, RPR, T&S stat  Analgesia   - Tylenol, aqua K  FEN   - Reg diet, IV NS for hydration    D/w   Belinda Gomez DO  PGY-4 OB/GYN   8/12/2020 4:07 PM

## 2020-08-12 NOTE — SOCIAL WORK
Justen Lujan [phone] at [facility] 14 is assigned to the case - notified her that mom is staying inpatient - she called MOBs room who now states that her boyfriend, Calderon Mathews is picking the children up when he brings her food later  Justen Lujan called Ambrocio Robbins who verified he is picking up the children tonight when he brings her food  Per Justen Lujan - she also asked MOB if she wanted to have a Telugu interpretor to which she stated "I speak english" and denied a Telugu speaking , however, some of the  issues may have also been a result of a language barrier  No concerns identified, children have adequate , should Ambrocio Robbins be unable to pick the children up, please call the hospital supervisor and ask them to call [address] [place] [address] [phone] opt 2 for after hours

## 2020-08-13 ENCOUNTER — APPOINTMENT (OUTPATIENT)
Dept: RADIOLOGY | Facility: HOSPITAL | Age: 30
DRG: 561 | End: 2020-08-13
Payer: COMMERCIAL

## 2020-08-13 ENCOUNTER — APPOINTMENT (OUTPATIENT)
Dept: MRI IMAGING | Facility: HOSPITAL | Age: 30
DRG: 561 | End: 2020-08-13
Payer: COMMERCIAL

## 2020-08-13 PROBLEM — N20.0 NEPHROLITHIASIS: Status: ACTIVE | Noted: 2020-08-13

## 2020-08-13 PROBLEM — D64.9 ANEMIA: Status: ACTIVE | Noted: 2020-08-13

## 2020-08-13 PROBLEM — Z34.90 CURRENTLY PREGNANT: Status: ACTIVE | Noted: 2020-08-13

## 2020-08-13 LAB
ANION GAP SERPL CALCULATED.3IONS-SCNC: 11 MMOL/L (ref 4–13)
BASOPHILS # BLD AUTO: 0.01 THOUSANDS/ΜL (ref 0–0.1)
BASOPHILS NFR BLD AUTO: 0 % (ref 0–1)
BUN SERPL-MCNC: 3 MG/DL (ref 5–25)
C TRACH DNA SPEC QL NAA+PROBE: NEGATIVE
CALCIUM SERPL-MCNC: 8.1 MG/DL (ref 8.3–10.1)
CHLORIDE SERPL-SCNC: 107 MMOL/L (ref 100–108)
CO2 SERPL-SCNC: 20 MMOL/L (ref 21–32)
CREAT SERPL-MCNC: 0.44 MG/DL (ref 0.6–1.3)
EOSINOPHIL # BLD AUTO: 0.01 THOUSAND/ΜL (ref 0–0.61)
EOSINOPHIL NFR BLD AUTO: 0 % (ref 0–6)
ERYTHROCYTE [DISTWIDTH] IN BLOOD BY AUTOMATED COUNT: 13.8 % (ref 11.6–15.1)
GFR SERPL CREATININE-BSD FRML MDRD: 138 ML/MIN/1.73SQ M
GLUCOSE P FAST SERPL-MCNC: 96 MG/DL (ref 65–99)
GLUCOSE SERPL-MCNC: 96 MG/DL (ref 65–140)
HCT VFR BLD AUTO: 30.6 % (ref 34.8–46.1)
HGB BLD-MCNC: 10.1 G/DL (ref 11.5–15.4)
IMM GRANULOCYTES # BLD AUTO: 0.03 THOUSAND/UL (ref 0–0.2)
IMM GRANULOCYTES NFR BLD AUTO: 0 % (ref 0–2)
LYMPHOCYTES # BLD AUTO: 1.01 THOUSANDS/ΜL (ref 0.6–4.47)
LYMPHOCYTES NFR BLD AUTO: 10 % (ref 14–44)
MCH RBC QN AUTO: 29.4 PG (ref 26.8–34.3)
MCHC RBC AUTO-ENTMCNC: 33 G/DL (ref 31.4–37.4)
MCV RBC AUTO: 89 FL (ref 82–98)
MONOCYTES # BLD AUTO: 0.85 THOUSAND/ΜL (ref 0.17–1.22)
MONOCYTES NFR BLD AUTO: 8 % (ref 4–12)
N GONORRHOEA DNA SPEC QL NAA+PROBE: NEGATIVE
NEUTROPHILS # BLD AUTO: 8.56 THOUSANDS/ΜL (ref 1.85–7.62)
NEUTS SEG NFR BLD AUTO: 82 % (ref 43–75)
NRBC BLD AUTO-RTO: 0 /100 WBCS
PLATELET # BLD AUTO: 204 THOUSANDS/UL (ref 149–390)
PMV BLD AUTO: 9.9 FL (ref 8.9–12.7)
POTASSIUM SERPL-SCNC: 3.4 MMOL/L (ref 3.5–5.3)
RBC # BLD AUTO: 3.44 MILLION/UL (ref 3.81–5.12)
SODIUM SERPL-SCNC: 138 MMOL/L (ref 136–145)
WBC # BLD AUTO: 10.47 THOUSAND/UL (ref 4.31–10.16)

## 2020-08-13 PROCEDURE — 80048 BASIC METABOLIC PNL TOTAL CA: CPT | Performed by: OBSTETRICS & GYNECOLOGY

## 2020-08-13 PROCEDURE — 99223 1ST HOSP IP/OBS HIGH 75: CPT | Performed by: INTERNAL MEDICINE

## 2020-08-13 PROCEDURE — C1894 INTRO/SHEATH, NON-LASER: HCPCS

## 2020-08-13 PROCEDURE — NC001 PR NO CHARGE: Performed by: OBSTETRICS & GYNECOLOGY

## 2020-08-13 PROCEDURE — 94762 N-INVAS EAR/PLS OXIMTRY CONT: CPT

## 2020-08-13 PROCEDURE — 85025 COMPLETE CBC W/AUTO DIFF WBC: CPT | Performed by: OBSTETRICS & GYNECOLOGY

## 2020-08-13 PROCEDURE — C1769 GUIDE WIRE: HCPCS

## 2020-08-13 PROCEDURE — G1004 CDSM NDSC: HCPCS

## 2020-08-13 PROCEDURE — 72148 MRI LUMBAR SPINE W/O DYE: CPT

## 2020-08-13 PROCEDURE — 99225 PR SBSQ OBSERVATION CARE/DAY 25 MINUTES: CPT | Performed by: OBSTETRICS & GYNECOLOGY

## 2020-08-13 RX ORDER — ACETAMINOPHEN 325 MG/1
975 TABLET ORAL EVERY 6 HOURS PRN
Status: DISCONTINUED | OUTPATIENT
Start: 2020-08-13 | End: 2020-08-14

## 2020-08-13 RX ORDER — SODIUM CHLORIDE 9 MG/ML
125 INJECTION, SOLUTION INTRAVENOUS CONTINUOUS
Status: DISCONTINUED | OUTPATIENT
Start: 2020-08-13 | End: 2020-08-15 | Stop reason: HOSPADM

## 2020-08-13 RX ORDER — MORPHINE SULFATE 4 MG/ML
1 INJECTION, SOLUTION INTRAMUSCULAR; INTRAVENOUS EVERY 4 HOURS PRN
Status: DISCONTINUED | OUTPATIENT
Start: 2020-08-13 | End: 2020-08-13

## 2020-08-13 RX ORDER — ACETAMINOPHEN 325 MG/1
975 TABLET ORAL EVERY 6 HOURS SCHEDULED
Status: DISCONTINUED | OUTPATIENT
Start: 2020-08-13 | End: 2020-08-13

## 2020-08-13 RX ORDER — HYDROMORPHONE HCL/PF 1 MG/ML
0.2 SYRINGE (ML) INJECTION
Status: CANCELLED | OUTPATIENT
Start: 2020-08-13

## 2020-08-13 RX ORDER — ACETAMINOPHEN 325 MG/1
975 TABLET ORAL EVERY 6 HOURS PRN
Status: DISCONTINUED | OUTPATIENT
Start: 2020-08-13 | End: 2020-08-13

## 2020-08-13 RX ORDER — ONDANSETRON 2 MG/ML
4 INJECTION INTRAMUSCULAR; INTRAVENOUS EVERY 6 HOURS PRN
Status: CANCELLED | OUTPATIENT
Start: 2020-08-13

## 2020-08-13 RX ORDER — OXYCODONE HYDROCHLORIDE 5 MG/1
5 TABLET ORAL EVERY 6 HOURS PRN
Status: DISCONTINUED | OUTPATIENT
Start: 2020-08-13 | End: 2020-08-13

## 2020-08-13 RX ADMIN — SODIUM CHLORIDE 125 ML/HR: 0.9 INJECTION, SOLUTION INTRAVENOUS at 19:04

## 2020-08-13 RX ADMIN — CEFTRIAXONE 2000 MG: 2 INJECTION, POWDER, FOR SOLUTION INTRAMUSCULAR; INTRAVENOUS at 09:32

## 2020-08-13 RX ADMIN — MORPHINE SULFATE 1 MG: 4 INJECTION INTRAVENOUS at 14:40

## 2020-08-13 RX ADMIN — SODIUM CHLORIDE 125 ML/HR: 0.9 INJECTION, SOLUTION INTRAVENOUS at 04:18

## 2020-08-13 RX ADMIN — SODIUM CHLORIDE 125 ML/HR: 0.9 INJECTION, SOLUTION INTRAVENOUS at 16:27

## 2020-08-13 RX ADMIN — ACETAMINOPHEN 975 MG: 325 TABLET ORAL at 17:01

## 2020-08-13 RX ADMIN — CEFEPIME HYDROCHLORIDE 2000 MG: 2 INJECTION, POWDER, FOR SOLUTION INTRAVENOUS at 17:00

## 2020-08-13 RX ADMIN — ACETAMINOPHEN 975 MG: 325 TABLET ORAL at 06:15

## 2020-08-13 RX ADMIN — HYDROMORPHONE HYDROCHLORIDE: 10 INJECTION INTRAMUSCULAR; INTRAVENOUS; SUBCUTANEOUS at 19:02

## 2020-08-13 NOTE — CONSULTS
Consultation - Infectious Disease   Daryle Ruder 31 y o  female MRN: [id number]  Unit/Bed#: L&D 324-01 Encounter: [id number]      IMPRESSION & RECOMMENDATIONS:   Impression/Recommendations: This is a 31 y o  female, healthy, 20 weeks pregnant with history of pregnancy related UTIs, admitted yesterday with right-sided pyelonephritis, not responding to outpatient p o  Antibiotic  1  Right-sided pyelonephritis  She had outpatient urine culture with growth of E coli  Patient did not respond to appropriate antibiotic as an outpatient  Currently, she is on IV ceftriaxone, which should be effective based on susceptibilities  However, patient is not clinically improved  Temperature remains low-grade  Her right flank pain remains severe  I suspect this is not antibiotic failure as much as persistent obstruction of right kidney and possibly infected renal cyst   Although coli in patient's urine culture is susceptible to ceftriaxone, this antibiotic is not highly excreted in the urinary tract  Therefore, I will modify her antibiotic regimen to cefepime, which is highly renally excreted  Change antibiotic to high-dose IV cefepime  Follow-up on final urine culture results  Monitor temperature/WBC  Monitor right flank pain  2  Moderately severe right-sided hydronephrosis  No obvious stone on MRI or ultrasound  This may be all compression from pregnant uterus  Patient is only at 21 week pregnancy  I suspect that the ureteral compression will worsen as a pregnancy progresses  The ureteral compression which prevents drainage from the right kidney may be responsible for her persistent severe flank pain  Patient should get drainage  PCN would be best option  Antibiotic plan as in above  Recommend IR evaluation for right-sided PCN  Monitor right flank pain  3  Right renal cysts, with evidence of possible cyst infection on MRI    As in above, patient's antibiotic regimen is been modified to cefepime, which should have good renal excretion and cyst penetration  If patient's flank pain does not improve with PCN above, her renal cysts may need to be aspirated/drained also  Antibiotic plan as in above  Recommendation for PCN as in above  If right flank pain persists despite above, consider cyst aspiration/drainage  4  Pregnancy, at 24 weeks gestational age  Outpatient and ER records reviewed in detail  Discussed with patient in detail regarding the above plan  Discussed with Dr Ricky Hayward from [facility] AT [facility]  Thank you for this consultation  We will follow along with you  HISTORY OF PRESENT ILLNESS:  Reason for Consult:  Pyelonephritis  HPI: Charmian Lundborg is a 31 y o  female, healthy, 21 weeks pregnant, came to ER yesterday with persistent right flank pain over last 10 days  Patient initially saw her Ob on 08/10 with mild right flank pain with urinary symptoms  She was started on a course of Macrobid for UTI  Urine culture grew E coli  Patient came back to the ER on 08/08 with worsening right flank pain  She was treated symptomatically  She finally came back to the ER yesterday  She was admitted  IV ceftriaxone was started for presumptive right-sided pyelonephritis  She did not have fever on presentation but was febrile overnight  Ultrasound and MRI showed presence of right renal cyst, changes consistent with right-sided pyelonephritis and significant right-sided hydronephrosis  Patient continues to have severe right-sided flank pain  For all these reasons, we are asked to evaluate the patient  At present, patient states she is uncomfortable sitting or lying in detail right flank pain  She has no further urinary symptoms  No further chills  This is patient's 4th pregnancy  She has had UTIs with her prior pregnancies but never had flank pain this bad  On H&P, it was stated the patient has history of nephrolithiasis  However, patient denies history of this      REVIEW OF SYSTEMS:  A complete system-based review was done  Except for what is noted in HPI above, ROS of systems is otherwise negative  PAST MEDICAL HISTORY:  Past Medical History:   Diagnosis Date    Chlamydia      Past Surgical History:   Procedure Laterality Date     SECTION  , ,      Problem list reviewed  FAMILY HISTORY:  Non-contributory    SOCIAL HISTORY:  Social History     Substance and Sexual Activity   Alcohol Use Never     Social History     Substance and Sexual Activity   Drug Use Never     Social History     Tobacco Use   Smoking Status Former Smoker    Packs/day: 0 20    Types: Cigarettes    Last attempt to quit: 2018    Years since quittin 6   Smokeless Tobacco Never Used       ALLERGIES:  Allergies   Allergen Reactions    Meperidine Rash       MEDICATIONS:  All current active medications have been reviewed  Patient is currently on IV ceftriaxone  PHYSICAL EXAM:  Vitals:  Temp:  [98 6 °F (37 °C)-100 6 °F (38 1 °C)] 99 9 °F (37 7 °C)  HR:  [100-114] 112  Resp:  [18-20] 20  BP: ()/(53-70) 130/70  SpO2:  [98 %-99 %] 99 %  Temp (24hrs), Av 8 °F (37 7 °C), Min:98 6 °F (37 °C), Max:100 6 °F (38 1 °C)  Current: Temperature: 99 9 °F (37 7 °C)     Physical Exam:  General:  Well-nourished, well-developed, in no acute distress  Awake, alert and oriented x 3  Eyes:  Conjunctive clear with no hemorrhages or effusions  Oropharynx:  No ulcers, no lesions, pharynx benign, no tonsillitis  Neck:  Supple, no lymphadenopathy, no mass, nontender  Lungs:  Expansion symmetric, no rales, no wheezing, no accessory muscle use  Cardiac:  Regular rate and rhythm, normal S1, normal S2, no murmurs  Abdomen:  Soft, distended by pregnant uterus, moderately severe right flank/CVA tenderness, no HSM  Extremities:  Trace leg edema, no erythema, nontender   No ulcers  Skin:  No rashes, no ulcers  Neurological:  Moves all four extremities spontaneously, sensation grossly intact    LABS, IMAGING, & OTHER STUDIES:  Lab Results:  I have personally reviewed pertinent labs  Results from last 7 days   Lab Units 08/13/20  0612 08/12/20  1009   POTASSIUM mmol/L 3 4* 3 0*   CHLORIDE mmol/L 107 100   CO2 mmol/L 20* 21   BUN mg/dL 3* 5   CREATININE mg/dL 0 44* 0 67   EGFR ml/min/1 73sq m 138 120   CALCIUM mg/dL 8 1* 8 5   AST U/L  --  18   ALT U/L  --  19   ALK PHOS U/L  --  94     Results from last 7 days   Lab Units 08/13/20  0612 08/12/20  1009   WBC Thousand/uL 10 47* 10 51*   HEMOGLOBIN g/dL 10 1* 10 9*   PLATELETS Thousands/uL 204 208     Results from last 7 days   Lab Units 08/12/20  1939 08/12/20  1918 08/12/20  1009   BLOOD CULTURE  Received in Microbiology Lab  Culture in Progress  Received in Microbiology Lab  Culture in Progress  --    URINE CULTURE   --   --  >100,000 cfu/ml Escherichia coli*       Imaging Studies:   I have personally reviewed pertinent imaging study reports and images in PACS  Abdominal MRI reviewed personally  Significant right-sided hydronephrosis with mild perinephric stranding  Complex right renal cyst with thickened wall  Renal ultrasound reviewed  Moderate right hydronephrosis  Right renal cyst present  EKG, Pathology, and Other Studies:   I have personally reviewed pertinent reports

## 2020-08-13 NOTE — PLAN OF CARE
Problem: PAIN - ADULT  Goal: Verbalizes/displays adequate comfort level or baseline comfort level  Description: Interventions:  - Encourage patient to monitor pain and request assistance  - Assess pain using appropriate pain scale  - Administer analgesics based on type and severity of pain and evaluate response  - Implement non-pharmacological measures as appropriate and evaluate response  - Consider cultural and social influences on pain and pain management  - Notify physician/advanced practitioner if interventions unsuccessful or patient reports new pain  Outcome: Progressing     Problem: INFECTION - ADULT  Goal: Absence or prevention of progression during hospitalization  Description: INTERVENTIONS:  - Assess and monitor for signs and symptoms of infection  - Monitor lab/diagnostic results  - Monitor all insertion sites, i e  indwelling lines, tubes, and drains  - Monitor endotracheal if appropriate and nasal secretions for changes in amount and color  - [place] appropriate cooling/warming therapies per order  - Administer medications as ordered  - Instruct and encourage patient and family to use good hand hygiene technique  - Identify and instruct in appropriate isolation precautions for identified infection/condition  Outcome: Progressing  Goal: Absence of fever/infection during neutropenic period  Description: INTERVENTIONS:  - Monitor WBC    Outcome: Progressing     Problem: SAFETY ADULT  Goal: Patient will remain free of falls  Description: INTERVENTIONS:  - Assess patient frequently for physical needs  -  Identify cognitive and physical deficits and behaviors that affect risk of falls    -  [place] fall precautions as indicated by assessment   - Educate patient/family on patient safety including physical limitations  - Instruct patient to call for assistance with activity based on assessment  - Modify environment to reduce risk of injury  - Consider OT/PT consult to assist with strengthening/mobility  Outcome: Progressing  Goal: Maintain or return to baseline ADL function  Description: INTERVENTIONS:  -  Assess patient's ability to carry out ADLs; assess patient's baseline for ADL function and identify physical deficits which impact ability to perform ADLs (bathing, care of mouth/teeth, toileting, grooming, dressing, etc )  - Assess/evaluate cause of self-care deficits   - Assess range of motion  - Assess patient's mobility; develop plan if impaired  - Assess patient's need for assistive devices and provide as appropriate  - Encourage maximum independence but intervene and supervise when necessary  - Involve family in performance of ADLs  - Assess for home care needs following discharge   - Consider OT consult to assist with ADL evaluation and planning for discharge  - Provide patient education as appropriate  Outcome: Progressing  Goal: Maintain or return mobility status to optimal level  Description: INTERVENTIONS:  - Assess patient's baseline mobility status (ambulation, transfers, stairs, etc )    - Identify cognitive and physical deficits and behaviors that affect mobility  - Identify mobility aids required to assist with transfers and/or ambulation (gait belt, sit-to-stand, lift, walker, cane, etc )  - [place] fall precautions as indicated by assessment  - Record patient progress and toleration of activity level on Mobility SBAR; progress patient to next Phase/Stage  - Instruct patient to call for assistance with activity based on assessment  - Consider rehabilitation consult to assist with strengthening/weightbearing, etc   Outcome: Progressing     Problem: Knowledge Deficit  Goal: Patient/family/caregiver demonstrates understanding of disease process, treatment plan, medications, and discharge instructions  Description: Complete learning assessment and assess knowledge base    Interventions:  - Provide teaching at level of understanding  - Provide teaching via preferred learning methods  Outcome: Progressing     Problem: DISCHARGE PLANNING  Goal: Discharge to home or other facility with appropriate resources  Description: INTERVENTIONS:  - Identify barriers to discharge w/patient and caregiver  - Arrange for needed discharge resources and transportation as appropriate  - Identify discharge learning needs (meds, wound care, etc )  - Arrange for interpretive services to assist at discharge as needed  - Refer to Case Management Department for coordinating discharge planning if the patient needs post-hospital services based on physician/advanced practitioner order or complex needs related to functional status, cognitive ability, or social support system  Outcome: Progressing

## 2020-08-13 NOTE — PROGRESS NOTES
Progress Note - Antepartum  Jai Shaikh 31 y o  female MRN: [id number]  Unit/Bed#: L&D 324-01 Encounter: [id number]    Assessment:  31 y o  [id number] at 21w2d admitted with suspected pyelonephritis  Hospital day #2    Plan:  1  Suspected pyelonephritis   - R sided CVA tenderness unchanged   - 1 g ceftriaxone daily   - Renal ultrasound, lactate, covid wnl   - Tmax 100 6 @2300; 100 4 this AM   - tachycardic 110 - 100s   - f/u AM CBC and CMP   - Urine and blood cultures pending  2  Hypokalemia   - 3 0 --> 40 mEq PO --> f/u AM CMP  3  Childcare issues   - CYS aware   - children with FOB  4  Regular diet, IV NS  5  DVT ppx: SCDs on     Subjective/Objective   Chief Complaint:     Right sided flank pain    Subjective: The patient reports that her symptoms are unchanged from yesterday  Her right sided flank pain has not improved or worsened  She does not subjectively report any fevers or chills; however, she is under multiple blankets and has an Aqua K pad on her back  She denies any chest pain, shortness of breath, headaches, cramping, vaginal bleeding or other discharge  She denies any new symptoms from yesterday  Objective:     Vitals:   Temp:  [98 1 °F (36 7 °C)-100 6 °F (38 1 °C)] 100 4 °F (38 °C)  HR:  [] 100  Resp:  [18-20] 20  BP: ()/(53-69) 106/58     Physical Exam:     Physical Exam  Constitutional:       General: She is not in acute distress  Appearance: She is well-developed  She is not diaphoretic  HENT:      Head: Normocephalic and atraumatic  Eyes:      Extraocular Movements: Extraocular movements intact  Conjunctiva/sclera: Conjunctivae normal       Pupils: Pupils are equal, round, and reactive to light  Neck:      Musculoskeletal: Normal range of motion and neck supple  Cardiovascular:      Rate and Rhythm: Regular rhythm  Tachycardia present  Heart sounds: Normal heart sounds  No murmur     Pulmonary:      Effort: Pulmonary effort is normal  No respiratory distress  Breath sounds: Normal breath sounds  No wheezing  Abdominal:      Palpations: Abdomen is soft  Tenderness: There is no abdominal tenderness  There is right CVA tenderness  There is no left CVA tenderness, guarding or rebound  Genitourinary:     Vagina: No vaginal discharge  Musculoskeletal: Normal range of motion  General: No tenderness  Skin:     General: Skin is warm and dry  Neurological:      Mental Status: She is alert and oriented to person, place, and time  Psychiatric:         Behavior: Behavior normal          Thought Content: Thought content normal            Fetal Assessment:  FHT: Tones daily; 160 bpm yesterday  [place]: No contractions     Lab, Imaging and other studies: I have personally reviewed pertinent reports        Lab Results   Component Value Date    WBC 10 47 (H) 08/13/2020    HGB 10 1 (L) 08/13/2020    HCT 30 6 (L) 08/13/2020    MCV 89 08/13/2020     08/13/2020       Lab Results   Component Value Date    CALCIUM 8 5 08/12/2020    K 3 0 (L) 08/12/2020    CO2 21 08/12/2020     08/12/2020    BUN 5 08/12/2020    CREATININE 0 67 08/12/2020       Lab Results   Component Value Date    ALT 19 08/12/2020    AST 18 08/12/2020    ALKPHOS 94 08/12/2020       Nancy Rose MD  OBGYN, PGY-2  8/13/2020  6:27 AM

## 2020-08-13 NOTE — QUICK NOTE
Quick update note:    IR and ID consulted in regards to patient's MRI that is concerning for R superinfected renal cyst increased in size from previous imaging as well as severe R sided hydronephrosis  Dr Kathie Pearson and I discussed escalating antibiotic therapy to cefepime from ceftriaxone  Her right flank pain is worsening, IR was contacted  She may benefit from IR drainage of the cyst for source control and antibiotic precision  PCN may also be necessary for symptomatic relief of pressure and hydroureter  Will discuss further with IR team     Vitals:    08/13/20 1446   BP: 130/70   Pulse: (!) 112   Resp: 20   Temp: 99 9 °F (37 7 °C)   SpO2:       Pt continues to be afebrile, no nausea or vomiting but pain is notable increased      Riaz Cline and Juhi Lema,   PGY-4 OB/GYN  8/13/2020 4:39 PM

## 2020-08-13 NOTE — SOCIAL WORK
Spoke with Walter Conner at [facility] 14 [phone] - she was just checking in on status of moms dc - notified her pt is still observation and unknown discharge at this time

## 2020-08-13 NOTE — UTILIZATION REVIEW
Initial Clinical Review    Admission: Date/Time/Statement:   Admission Orders (From admission, onward)     Ordered        08/12/20 1610  Place in Observation  Once                   Orders Placed This Encounter   Procedures    Place in Observation     Standing Status:   Standing     Number of Occurrences:   1     Order Specific Question:   Admitting Physician     Answer:   Everardo Nascimento [[id number]]     Order Specific Question:   Level of Care     Answer:   Med Surg [[id number]]     ED Arrival Information     Expected Arrival Acuity Means of Arrival Escorted By Service Admission Type    8/12/2020 8/12/2020 09:26 Urgent Walk-In Family Member OB/GYN Urgent    Arrival Complaint    back pain, fever        Chief Complaint   Patient presents with    Flank Pain     R flank pain, lower abdominal pain for 9 days, vomiting today  Assessment/Plan: 31 y o  [id number] female at 21w1d weeks gestation  admitted for Observation for possible right sided pyelonephritis versus recurrent cystitis with concerns for right nephrolithiasis  Reports new onset back pain for the last 9 days  Moderate intensity located in the right upper back  She reports a headache  She was treated with a course of Macrobid for a symptomatic UTI on 8/3/20  Cont IV antibx, intermittent FHR; & tocometry  Repeat GC/CT  IVF  Cervix:   deferred  Fetal heart rate:   Baseline Rate: 175 bpm  Variability: Moderate 6-25 bpm  Decelerations: None  [place]:   Contraction Frequency (minutes): 0  8/12/2020 per provider- Pt with new temp=100 4F, noted to be wrapped in multiple blankets with aqua K at time of elevated temp but pt feels warm to touch endorses right CVA tenderness with tachycardia, FHT tachycardia  Feels probable Pyelonephritis, less likely chorioamnionitis  Cont IVF, blood cultures, covid tested, lactic acid & monitor closely  8/13 : per MFM: clinical picture appears most consistent with either pyelonephritis, appendicitis or complex UTI with stone   Recommend MRi imaging to eval appendix; if not available Ct of abd is reasonable  Advise 2g Ceftriaxone  8/13 PM update attending: Pt has returned from MRI, pain on right flank has acutely worsened now an 8/10 and pt is visibly in severe pain  On exam R CVA tenderness present, no rebound tenderness, some voluntary guarding of the abdomen  No new elevated temperatures, plan to continue fluids, ceftriaxone, on cursory MRI review R renal cyst present w/o gross evidence of disease otherwise   Will f/u final read from radiology  ED Triage Vitals [08/12/20 0914]   Temperature Pulse Respirations Blood Pressure SpO2   99 8 °F (37 7 °C) (!) 124 20 136/69 97 %      Temp Source Heart Rate Source Patient Position - Orthostatic VS BP Location FiO2 (%)   Oral Monitor Sitting Right arm --      Pain Score       Worst Possible Pain          Wt Readings from Last 1 Encounters:   08/12/20 70 8 kg (156 lb)     Additional Vital Signs:   Date/Time   Temp   Pulse   Resp   BP   SpO2   O2 Device   Patient Position - Orthostatic VS    08/13/20 0848   98 6 °F (37 °C)   --   --   --   --   --   --    08/13/20 0716   100 2 °F (37 9 °C)   104   20   105/56   --   --   Lying    08/13/20 0613   100 4 °F (38 °C)   100   20   106/58   99 %   --   Lying    08/12/20 2330   99 6 °F (37 6 °C)   --   --   --   --   --   --    08/12/20 2306   100 6 °F (38 1 °C)Abnormal     114Abnormal     18   89/53Abnormal     98 %   --   Lying    08/12/20 1752   100 1 °F (37 8 °C)   --   --   --   --   --   --    08/12/20 1730   100 4 °F (38 °C)   --   --   --   --   --   --    08/12/20 1532   --   120Abnormal     --   138/61   --   --   --    08/12/20 1510   98 6 °F (37 °C)   --   --   --   --   --   --    08/12/20 1000   --   --   --   106/57   --   --   --    08/12/20 0950   98 1 °F (36 7 °C)   93   20   106/57   --   --   Lying    08/12/20 0914   99 8 °F (37 7 °C)   124Abnormal     20   136/69   97 %   None (Room air)   Sitting       Weights (last 14 days)     Date/Time Weight   Height    08/12/20 0950   70 8 kg (156 lb)   5' 1" (1 549 m)       Pertinent Labs/Diagnostic Test Results:   Results from last 7 days   Lab Units 08/12/20  1946   SARS-COV-2  Negative     Results from last 7 days   Lab Units 08/13/20  0612 08/12/20  1009   WBC Thousand/uL 10 47* 10 51*   HEMOGLOBIN g/dL 10 1* 10 9*   HEMATOCRIT % 30 6* 32 6*   PLATELETS Thousands/uL 204 208   NEUTROS ABS Thousands/µL 8 56* 9 38*         Results from last 7 days   Lab Units 08/13/20  0612 08/12/20  1009   SODIUM mmol/L 138 133*   POTASSIUM mmol/L 3 4* 3 0*   CHLORIDE mmol/L 107 100   CO2 mmol/L 20* 21   ANION GAP mmol/L 11 12   BUN mg/dL 3* 5   CREATININE mg/dL 0 44* 0 67   EGFR ml/min/1 73sq m 138 120   CALCIUM mg/dL 8 1* 8 5     Results from last 7 days   Lab Units 08/12/20  1009   AST U/L 18   ALT U/L 19   ALK PHOS U/L 94   TOTAL PROTEIN g/dL 6 7   ALBUMIN g/dL 2 3*   TOTAL BILIRUBIN mg/dL 0 42         Results from last 7 days   Lab Units 08/13/20  0612 08/12/20  1009   GLUCOSE RANDOM mg/dL 96 94           Results from last 7 days   Lab Units 08/12/20  1915   LACTIC ACID mmol/L 1 0       Results from last 7 days   Lab Units 08/12/20  1039   CLARITY UA  Slightly Cloudy   COLOR UA  Yellow   SPEC GRAV UA  1 025   PH UA  6 0   GLUCOSE UA mg/dl Negative   KETONES UA mg/dl 40 (2+)*   BLOOD UA  Small*   PROTEIN UA mg/dl Trace*   NITRITE UA  Positive*   BILIRUBIN UA  Negative   UROBILINOGEN UA E U /dl 0 2   LEUKOCYTES UA  Negative   WBC UA /hpf 4-10*   RBC UA /hpf 4-10*   BACTERIA UA /hpf Innumerable*   EPITHELIAL CELLS WET PREP /hpf Moderate*       Results from last 7 days   Lab Units 08/12/20  1939 08/12/20 1918   BLOOD CULTURE  Received in Microbiology Lab  Culture in Progress  Received in Microbiology Lab  Culture in Progress  8/12 RENAL ULTRASOUND  Impression:      1   Moderate right hydronephrosis     2   Evaluation for pyelonephritis is limited on ultrasound         ED Treatment:   Medication Administration from 08/12/2020 0902 to 08/13/2020 1012       Date/Time Order Dose Route Action     08/12/2020 1013 acetaminophen (TYLENOL) tablet 975 mg 975 mg Oral Given     08/12/2020 1348 potassium chloride (K-DUR,KLOR-CON) CR tablet 40 mEq 40 mEq Oral Given     08/12/2020 1339 cefTRIAXone (ROCEPHIN) 1,000 mg in dextrose 5 % 50 mL IVPB 1,000 mg Intravenous New Bag     08/12/2020 1339 sodium chloride 0 9 % infusion 1,000 mL/hr Intravenous New Bag     08/13/2020 0418 sodium chloride 0 9 % infusion 125 mL/hr Intravenous New Bag     08/12/2020 1948 sodium chloride 0 9 % infusion 125 mL/hr Intravenous New Bag     08/12/2020 1750 sodium chloride 0 9 % infusion 999 mL/hr Intravenous Rate/Dose Change     08/12/2020 1531 sodium chloride 0 9 % infusion 125 mL/hr Intravenous New Bag     08/13/2020 0615 acetaminophen (TYLENOL) tablet 975 mg 975 mg Oral Given     08/12/2020 2305 acetaminophen (TYLENOL) tablet 975 mg 975 mg Oral Given     08/12/2020 1739 acetaminophen (TYLENOL) tablet 975 mg 975 mg Oral Given     08/13/2020 0932 cefTRIAXone (ROCEPHIN) 2,000 mg in dextrose 5 % 50 mL IVPB 2,000 mg Intravenous New Bag        Past Medical History:   Diagnosis Date    Chlamydia 2010       Admitting Diagnosis: Back pain [M54 9]  Age/Sex: 31 y o  female  Admission Orders:  Intermittent FH monitoring  Fetal NST  scd  Scheduled Medications:  cefTRIAXone, 2,000 mg, Intravenous, Q24H    Continuous IV Infusions:  sodium chloride, 125 mL/hr, Intravenous, Continuous    PRN Meds:  acetaminophen, 975 mg, Oral, Q6H PRN        IP CONSULT TO CASE MANAGEMENT    Network Utilization Review Department  [email or web address]  ATTENTION: Please call with any questions or concerns to [phone] and carefully listen to the prompts so that you are directed to the right person   All voicemails are confidential   Harlene Pair all requests for admission clinical reviews, approved or denied determinations and any other requests to dedicated fax number below belonging to the campus where the patient is receiving treatment   List of dedicated fax numbers for the Facilities:  [address] DENIALS (Administrative/Medical Necessity) [phone]   [address]  (Maternity/NICU/Pediatrics) [phone]   Donald Sarkar [phone]   Tino Sanchez [phone]   Mi Duran [phone]   Austin Rose [phone]   [address] [place] [address] [phone]   [facility]  [phone]   [address] [facility]  [address] [facility] [address] [facility] [phone]

## 2020-08-13 NOTE — QUICK NOTE
Pt has returned from MRI, pain on right flank has acutely worsened now an 8/10 and pt is visibly in severe pain  On exam R CVA tenderness present, no rebound tenderness, some voluntary guarding of the abdomen  No new elevated temperatures, plan to continue fluids, ceftriaxone, on cursory MRI review R renal cyst present w/o gross evidence of disease otherwise  Will f/u final read from radiology  Continue tylenol, will also order 1mg morphine q4h for severe pain      Vitals:    08/13/20 1108   BP:    Pulse:    Resp:    Temp: 98 6 °F (37 °C)   SpO2:       Gordon Noble DO  PGY-4 OB/GYN  8/13/2020 2:34 PM

## 2020-08-14 LAB
APTT PPP: 40 SECONDS (ref 23–37)
BACTERIA UR CULT: ABNORMAL
FIBRINOGEN PPP-MCNC: 711 MG/DL (ref 227–495)
INR PPP: 1.12 (ref 0.84–1.19)
PROTHROMBIN TIME: 14.2 SECONDS (ref 11.6–14.5)

## 2020-08-14 PROCEDURE — 99233 SBSQ HOSP IP/OBS HIGH 50: CPT | Performed by: INTERNAL MEDICINE

## 2020-08-14 PROCEDURE — NC001 PR NO CHARGE: Performed by: OBSTETRICS & GYNECOLOGY

## 2020-08-14 PROCEDURE — 99255 IP/OBS CONSLTJ NEW/EST HI 80: CPT | Performed by: UROLOGY

## 2020-08-14 PROCEDURE — 99215 OFFICE O/P EST HI 40 MIN: CPT

## 2020-08-14 PROCEDURE — 85730 THROMBOPLASTIN TIME PARTIAL: CPT | Performed by: OBSTETRICS & GYNECOLOGY

## 2020-08-14 PROCEDURE — 85384 FIBRINOGEN ACTIVITY: CPT | Performed by: OBSTETRICS & GYNECOLOGY

## 2020-08-14 PROCEDURE — 99232 SBSQ HOSP IP/OBS MODERATE 35: CPT | Performed by: OBSTETRICS & GYNECOLOGY

## 2020-08-14 PROCEDURE — 94762 N-INVAS EAR/PLS OXIMTRY CONT: CPT

## 2020-08-14 PROCEDURE — 85610 PROTHROMBIN TIME: CPT | Performed by: OBSTETRICS & GYNECOLOGY

## 2020-08-14 RX ORDER — DOCUSATE SODIUM 100 MG/1
100 CAPSULE, LIQUID FILLED ORAL 2 TIMES DAILY
Status: DISCONTINUED | OUTPATIENT
Start: 2020-08-14 | End: 2020-08-15 | Stop reason: HOSPADM

## 2020-08-14 RX ORDER — ACETAMINOPHEN 325 MG/1
975 TABLET ORAL EVERY 6 HOURS SCHEDULED
Status: DISCONTINUED | OUTPATIENT
Start: 2020-08-14 | End: 2020-08-15 | Stop reason: HOSPADM

## 2020-08-14 RX ORDER — OXYCODONE HYDROCHLORIDE 10 MG/1
10 TABLET ORAL EVERY 4 HOURS PRN
Status: DISCONTINUED | OUTPATIENT
Start: 2020-08-14 | End: 2020-08-15 | Stop reason: HOSPADM

## 2020-08-14 RX ORDER — OXYCODONE HYDROCHLORIDE 5 MG/1
5 TABLET ORAL EVERY 4 HOURS PRN
Status: DISCONTINUED | OUTPATIENT
Start: 2020-08-14 | End: 2020-08-15 | Stop reason: HOSPADM

## 2020-08-14 RX ADMIN — SODIUM CHLORIDE 125 ML/HR: 0.9 INJECTION, SOLUTION INTRAVENOUS at 10:34

## 2020-08-14 RX ADMIN — SODIUM CHLORIDE 125 ML/HR: 0.9 INJECTION, SOLUTION INTRAVENOUS at 20:45

## 2020-08-14 RX ADMIN — SODIUM CHLORIDE 125 ML/HR: 0.9 INJECTION, SOLUTION INTRAVENOUS at 01:41

## 2020-08-14 RX ADMIN — ACETAMINOPHEN 975 MG: 325 TABLET ORAL at 18:27

## 2020-08-14 RX ADMIN — CEFEPIME HYDROCHLORIDE 2000 MG: 2 INJECTION, POWDER, FOR SOLUTION INTRAVENOUS at 04:46

## 2020-08-14 RX ADMIN — DOCUSATE SODIUM 100 MG: 100 CAPSULE, LIQUID FILLED ORAL at 20:44

## 2020-08-14 RX ADMIN — CEFEPIME HYDROCHLORIDE 2000 MG: 2 INJECTION, POWDER, FOR SOLUTION INTRAVENOUS at 16:32

## 2020-08-14 RX ADMIN — OXYCODONE HYDROCHLORIDE 5 MG: 5 TABLET ORAL at 18:28

## 2020-08-14 NOTE — DISCHARGE SUMMARY
Discharge Summary - Dangelo Perla 31 y o  female MRN: [id number]    Unit/Bed#: L&D 942-27 Encounter: [id number]    Admission Date: 2020     Discharge Date: 8/15/20    Admitting Diagnosis:   Patient Active Problem List   Diagnosis    Fetal ultrasound    Genetic screening    Contraception management    Previous c/s x3    Prior IUGR    History of PTD @32 weeks    Obesity in pregnancy    UTI in pregnancy    Prenatal care in second trimester    Acute cystitis with hematuria    21 weeks gestation of pregnancy    Nephrolithiasis    Currently pregnant    Anemia       Discharge Diagnosis:   Right hydronephrosis, right renal cyst    Procedures:   None    Admitting Attending: Dr Aicha Jara  Discharge Attending: Dr Easton Mace Course:     Dangelo Perla is a 31 y o  [id number] who was admitted at 21w1d for suspected right pyelonephritis after having significant right CVA tenderness and a reepat positive UTI after a course of macrobid antibiotics prescribed from [facility] providers (completed 8/3/20)  E  Coli >100k and susceptible to macrobid   labor workup was negative  She was admitted and treated with ceftriaxone 1g and this was increased to 2g on hospital day 2  An MRI was performed and ruled out appendicitis, an increased size of her known right renal cyst from approximately 1x1 to 3x3cm was noted and this was supsected to be superinfected  She also had significant right asymmetric hydronephrosis  She had a Tmax of 100 6*F on 20 at 2300  White count was 10 51k and downtrended to 10 47k  She continued to receive IV fluids, COVID testing and lactic acid were normal      Infectious disease was consulted on HD #2 after her pain worsened despite treatment  Ceftriaxone was discontinued and cefepime 2g was started  IR and urology were consulted   Originally she was thought to benefit from interventional PCN or stenting to drain as her pain was thought to be a result of the significant hydronephrosis  The right renal cyst was not thought to be grossly infected or require drainage as it is unlikely the cause of her symptoms  She declined stenting and PCN on HD#3  She received 4 days worth of IV antibiotics and was transitioned and discharged home with cefpodoxime for 14d per ID  Pyelonephritis precautions were reviewed with Lamar and she was instructed to follow up in the office  On day of discharge she was ambulating, voiding spontaneously, tolerating oral intake and hemodynamically stable  Condition at discharge:   good     Disposition:   Home    Planned Readmission:   No    Discharge Medications:   Prenatal vitamin daily  Tylenol (over the counter) per bottle directions as needed for pain  Cefpodoxime for 14d    Discharge instructions :   -You may walk for exercise for the first 6 weeks then gradually return to your usual activities    -You may take baths or shower per your preference    -Please call your provider if temperature > 100 4*F or 38* C, worsening pain or a foul discharge

## 2020-08-14 NOTE — CASE MANAGEMENT
The patient has since been switched over to inpatient status now, although she was still on observation this morning so this case management assistant (CMA) did complete and observation notice with her  She signed for this CMA

## 2020-08-14 NOTE — QUICK NOTE
Spoke with urology PA regarding placement of a PCN vs ureteral stent  Procedure is on hold until this afternoon to give him a chance to speak with patient regarding her treatment options including observation  Please keep the patient NPO until a decision is made

## 2020-08-14 NOTE — PROGRESS NOTES
Progress Note - Infectious Disease   Baljit Romero 31 y o  female MRN: [id number]  Unit/Bed#: L&D 314-01 Encounter: [id number]      Impression/Recommendations:  1  Right-sided pyelonephritis  Patient did not respond to IV ceftriaxone but response to IV cefepime  This may be all secondary to less than optimal urinary secretion of ceftriaxone  Consider effect of right ureteral obstruction  Consider infected renal cyst   With patient responding to IV cefepime, will continue it for 24 hours  Anticipate transition to p o  soon  Continue high-dose IV cefepime  Monitor temperature/WBC  Monitor right flank pain  Transition to p o  cefpodoxime in the next 24 hours, if patient continues to improve clinically  Treat x 14 days total      2  Moderately severe right-sided hydronephrosis  No obvious stone on MRI or ultrasound  This may be all compression from pregnant uterus  Patient is only at 21 week pregnancy  I suspect that the ureteral compression will worsen as a pregnancy progresses  I suspect the patient will eventually need ureteral stenting, either PCN or ureteral stent as pregnancy progresses  Plan for conservative management at present noted  Given clinical improvement, it is reasonable for now  Antibiotic plan as in above  Monitor right flank pain  Urology follow-up      3  Right renal cysts, with evidence of possible cyst infection on MRI  with patient clinically responding to antibiotic change above, will monitor for now  Antibiotic plan as in above      4  Pregnancy, at 24 weeks gestational age      Discussed with patient in detail regarding the above plan  Antibiotics:  Cefepime # 2  Antibiotic # 3     Subjective:  Patient feels a lot better  Right flank pain is much improved today  Temperature is down  No further chills  She is tolerating antibiotic well  No nausea, vomiting or diarrhea      Objective:  Vitals:  Temp:  [98 1 °F (36 7 °C)-99 9 °F (37 7 °C)] 98 7 °F (37 1 °C)  HR: [] 105  Resp:  [18-20] 18  BP: (108-130)/(67-74) 117/73  SpO2:  [96 %] 96 %  Temp (24hrs), Av 9 °F (37 2 °C), Min:98 1 °F (36 7 °C), Max:99 9 °F (37 7 °C)  Current: Temperature: 98 7 °F (37 1 °C)    Physical Exam:     General: Awake, alert, cooperative, no distress  Neck:  Supple  No mass  No lymphadenopathy  Lungs: Expansion symmetric, no rales, no wheezing, respirations unlabored  Heart:  Regular rate and rhythm, S1 and S2 normal, no murmur  Abdomen: Soft, stable distended uterus, much improved right flank tenderness, bowel sounds active all four quadrants, no masses, no organomegaly  Extremities: Trace edema  No erythema/warmth  No ulcer  Nontender to palpation  Skin:  No rash  Neuro: Moves all extremities  Invasive Devices     Peripheral Intravenous Line            Peripheral IV 20 Right;Ventral (anterior) Hand 1 day                Labs studies:   I have personally reviewed pertinent labs  Results from last 7 days   Lab Units 20  0612 20  1009   POTASSIUM mmol/L 3 4* 3 0*   CHLORIDE mmol/L 107 100   CO2 mmol/L 20* 21   BUN mg/dL 3* 5   CREATININE mg/dL 0 44* 0 67   EGFR ml/min/1 73sq m 138 120   CALCIUM mg/dL 8 1* 8 5   AST U/L  --  18   ALT U/L  --  19   ALK PHOS U/L  --  94     Results from last 7 days   Lab Units 20  0612 20  1009   WBC Thousand/uL 10 47* 10 51*   HEMOGLOBIN g/dL 10 1* 10 9*   PLATELETS Thousands/uL 204 208     Results from last 7 days   Lab Units 20  1939 20  1918 20  1009   BLOOD CULTURE  No Growth at 24 hrs  No Growth at 24 hrs   --    URINE CULTURE   --   --  >100,000 cfu/ml Escherichia coli*       Imaging Studies:   I have personally reviewed pertinent imaging study reports and images in PACS  EKG, Pathology, and Other Studies:   I have personally reviewed pertinent reports

## 2020-08-14 NOTE — SOCIAL WORK
Provided Rossi from [place] [address] a medical update that MOB still hospitalized, unsure when she will be discahrged  As of right now, seems like childcare has not been an issue

## 2020-08-14 NOTE — PLAN OF CARE
Problem: PAIN - ADULT  Goal: Verbalizes/displays adequate comfort level or baseline comfort level  Description: Interventions:  - Encourage patient to monitor pain and request assistance  - Assess pain using appropriate pain scale  - Administer analgesics based on type and severity of pain and evaluate response  - Implement non-pharmacological measures as appropriate and evaluate response  - Consider cultural and social influences on pain and pain management  - Notify physician/advanced practitioner if interventions unsuccessful or patient reports new pain  Outcome: Progressing     Problem: INFECTION - ADULT  Goal: Absence or prevention of progression during hospitalization  Description: INTERVENTIONS:  - Assess and monitor for signs and symptoms of infection  - Monitor lab/diagnostic results  - Monitor all insertion sites, i e  indwelling lines, tubes, and drains  - Monitor endotracheal if appropriate and nasal secretions for changes in amount and color  - [place] appropriate cooling/warming therapies per order  - Administer medications as ordered  - Instruct and encourage patient and family to use good hand hygiene technique  - Identify and instruct in appropriate isolation precautions for identified infection/condition  Outcome: Progressing  Goal: Absence of fever/infection during neutropenic period  Description: INTERVENTIONS:  - Monitor WBC    Outcome: Progressing     Problem: SAFETY ADULT  Goal: Patient will remain free of falls  Description: INTERVENTIONS:  - Assess patient frequently for physical needs  -  Identify cognitive and physical deficits and behaviors that affect risk of falls    -  [place] fall precautions as indicated by assessment   - Educate patient/family on patient safety including physical limitations  - Instruct patient to call for assistance with activity based on assessment  - Modify environment to reduce risk of injury  - Consider OT/PT consult to assist with strengthening/mobility  Outcome: Progressing  Goal: Maintain or return to baseline ADL function  Description: INTERVENTIONS:  -  Assess patient's ability to carry out ADLs; assess patient's baseline for ADL function and identify physical deficits which impact ability to perform ADLs (bathing, care of mouth/teeth, toileting, grooming, dressing, etc )  - Assess/evaluate cause of self-care deficits   - Assess range of motion  - Assess patient's mobility; develop plan if impaired  - Assess patient's need for assistive devices and provide as appropriate  - Encourage maximum independence but intervene and supervise when necessary  - Involve family in performance of ADLs  - Assess for home care needs following discharge   - Consider OT consult to assist with ADL evaluation and planning for discharge  - Provide patient education as appropriate  Outcome: Progressing  Goal: Maintain or return mobility status to optimal level  Description: INTERVENTIONS:  - Assess patient's baseline mobility status (ambulation, transfers, stairs, etc )    - Identify cognitive and physical deficits and behaviors that affect mobility  - Identify mobility aids required to assist with transfers and/or ambulation (gait belt, sit-to-stand, lift, walker, cane, etc )  - [place] fall precautions as indicated by assessment  - Record patient progress and toleration of activity level on Mobility SBAR; progress patient to next Phase/Stage  - Instruct patient to call for assistance with activity based on assessment  - Consider rehabilitation consult to assist with strengthening/weightbearing, etc   Outcome: Progressing     Problem: Knowledge Deficit  Goal: Patient/family/caregiver demonstrates understanding of disease process, treatment plan, medications, and discharge instructions  Description: Complete learning assessment and assess knowledge base    Interventions:  - Provide teaching at level of understanding  - Provide teaching via preferred learning methods  Outcome: Progressing     Problem: DISCHARGE PLANNING  Goal: Discharge to home or other facility with appropriate resources  Description: INTERVENTIONS:  - Identify barriers to discharge w/patient and caregiver  - Arrange for needed discharge resources and transportation as appropriate  - Identify discharge learning needs (meds, wound care, etc )  - Arrange for interpretive services to assist at discharge as needed  - Refer to Case Management Department for coordinating discharge planning if the patient needs post-hospital services based on physician/advanced practitioner order or complex needs related to functional status, cognitive ability, or social support system  Outcome: Progressing

## 2020-08-14 NOTE — CONSULTS
[address] NOTE   Admission Date: 2020    Patient Identifiers: Larry Palafox (MRN: [id number])  Service Requesting Consultation:Veronica Peña MD  Service Providing Consultation:  Urology, Jaquelin Abraham PA-C  Consults  Date of Service: 2020    Reason for Consultation:  Pyelonephritis with hydronephrosis in pregnancy    History of Present Illness:     Larry Palafox is a 31 y o  female who is 21 weeks pregnant with a history of recurrent urinary tract infection with E coli  She presented with right-sided pain and possible pyelonephritis  She had an ultrasound showing moderate hydronephrosis  An MRI was done showing right-sided hydronephrosis with no evidence of stone  Urine culture on  was positive for greater than 100,000 E coli  WBC 10 51  Creatinine is 0 44  There was some question whether she needed a percutaneous nephrostomy and interventional radiology was consulted  T-max was a 100 2°  At present she is afebrile  She is still experiencing some right flank pain  There is no nausea or vomiting  She was able to eat pizza last night      Past Medical, Past Surgical History:     Past Medical History:   Diagnosis Date    Chlamydia    :    Past Surgical History:   Procedure Laterality Date     SECTION  , , 2017   :    Medications, Allergies:     Current Facility-Administered Medications:     acetaminophen (TYLENOL) tablet 975 mg, 975 mg, Oral, Q6H PRN, Cesia Leary DO, 975 mg at 20 1701    cefepime (MAXIPIME) 2,000 mg in dextrose 5 % 50 mL IVPB, 2,000 mg, Intravenous, Q12H, Samantha Grace MD, Last Rate: 100 mL/hr at 20 0446, 2,000 mg at 20 0446    HYDROmorphone (DILAUDID) 1 mg/mL 50 mL PCA, , Intravenous, Continuous, Cesia Leary DO    sodium chloride 0 9 % infusion, 125 mL/hr, Intravenous, Continuous, Cesia Leary DO, Stopped at 20 1903    sodium chloride 0 9 % infusion, 125 mL/hr, Intravenous, Continuous, Monique Alter, DO, Last Rate: 125 mL/hr at 20 1034, 125 mL/hr at 20 1034    Allergies: Allergies   Allergen Reactions    Meperidine Rash   :    Social and Family History:   Social History:   Social History     Tobacco Use    Smoking status: Former Smoker     Packs/day: 0 20     Types: Cigarettes     Last attempt to quit: 2018     Years since quittin 6    Smokeless tobacco: Never Used   Substance Use Topics    Alcohol use: Never    Drug use: Never     Social History     Tobacco Use   Smoking Status Former Smoker    Packs/day: 0 20    Types: Cigarettes    Last attempt to quit: 2018    Years since quittin 6   Smokeless Tobacco Never Used       Family History:  Family History   Problem Relation Age of Onset    Cancer Mother         uterine    No Known Problems Father     No Known Problems Sister     No Known Problems Brother     Diabetes Maternal Grandmother     No Known Problems Paternal Grandmother     No Known Problems Sister     No Known Problems Brother     No Known Problems Brother     No Known Problems Brother     Cancer Son         renal, Wilms tumor    Breast cancer Neg Hx    :     Review of Systems:     General: Fever, chills, or night sweats: positive  Cardiac: Negative for chest pain  Pulmonary: Negative for shortness of breath  Gastrointestinal: Abdominal pain negative  Nausea, vomiting, or diarrhea negative,  Genitourinary: See HPI above  Patient does not have hematuria  All other systems queried were negative  Physical Exam:   General: Patient is pleasant and in NAD   Awake and alert  /73 (BP Location: Right arm)   Pulse 105   Temp 98 7 °F (37 1 °C) (Oral)   Resp 18   Ht 5' 1" (1 549 m)   Wt 70 8 kg (156 lb)   LMP 2020 (Exact Date)   SpO2 96%   BMI 29 48 kg/m²   HEENT:  No scleral icterus the conjunctivae are clear  Constitutional:  Pleasant cooperative 81-year-old female  Cardiac: Peripheral edema: negative  Pulmonary: Non-labored breathing  Abdomen: Soft, non-tender, non-distended  No surgical scars  No masses, tenderness, hernias noted  Genitourinary: Positive CVA tenderness, negative suprapubic tenderness  Extremities:  Moves all extremities without any deformity or weakness  Neurological:  No numbness or tingling with a nonfocal neurologic exam  Psychiatric:  Behavior affect and mood normal      Labs:     Lab Results   Component Value Date    HGB 10 1 (L) 08/13/2020    HCT 30 6 (L) 08/13/2020    WBC 10 47 (H) 08/13/2020     08/13/2020   ]    Lab Results   Component Value Date    K 3 4 (L) 08/13/2020     08/13/2020    CO2 20 (L) 08/13/2020    BUN 3 (L) 08/13/2020    CREATININE 0 44 (L) 08/13/2020    CALCIUM 8 1 (L) 08/13/2020   ]    Imaging:   I personally reviewed the images and report of the following studies, and reviewed them with the patient:  MRI OF THE ABDOMEN WITHOUT CONTRAST APPENDICITIS   IMPRESSION:     1  No evidence of acute appendicitis  2   Right-sided hydronephrosis, likely related to pregnancy, though there is mild perinephric stranding suggesting an underlying acute process such as pyelonephritis  A right renal cyst has significantly increased in size from February and appears   complex with T2 hypointensity and thickened wall  This is likely superinfected  ASSESSMENT:     1  Urinary tract infection with pyelonephritis  2   Right-sided hydronephrosis  3  21 weeks gestation     PLAN:   -I reviewed the options of management with the patient as well as the OB team and interventional Radiology and Dr Leonel Regalado  -best option for conservative treatment with antibiotics and pain control at this juncture in the absence of any obvious obstruction   -would continue to monitor in the hospital for another 24 hours prior to discharge and treat with appropriate antibiotics according to culture  -I discussed with the patient the options of percutaneous nephrostomy verses nephroureteral stent would be present for the remainder of her pregnancy and she understands  -okay for regular diet      Thank you for allowing me to participate in this patients care  Please do not hesitate to call with any additional questions    Nanci Bradley PA-C

## 2020-08-14 NOTE — UTILIZATION REVIEW
Continued Stay Review    Date: 8/14/2020                        OBSERVATION ADMISSION 8/12/2020 1610 CONVERTED TO INPATIENT ADMISSION 8/14/2020 0946 DUE TO PYELONEPHRITIS WITH POSSIBLE RENAL CYST INFECTION; FAILED OP ANTIBIOTICS & REQ IV ANTIBX & IVH/ IV PAIN PUMP  IN 21W3D  PREGNANCY  08/14/20 0947    Inpatient Admission  Once      Transfer Service: OB/GYN       Question  Answer  Comment    Admitting Physician  Joseph Norton     Level of Care  Med Surg     Estimated length of stay  More than 2 Midnights     Certification  I certify that inpatient services are medically necessary for this patient for a duration of greater than two midnights  See H&P and MD Progress Notes for additional information about the patient's course of treatment  08/14/20 0946     Current Patient Class: Inpatient  Current Level of Care: med surg  HPI:30 y o  female initially admitted on 8/12/2020 at 21w1d weeks gestation  admitted for Observation for possible right sided pyelonephritis versus recurrent cystitis with concerns for right nephrolithiasis  Reports new onset back pain for the last 9 days  Moderate intensity located in the right upper back  She reports a headache  She was treated with a course of Macrobid for a symptomatic UTI on 8/3/20  Assessment/Plan:   8/13 Infectious disease: Patient 21w1d pregnant with right sided pyelonephritis:  outpt urine culture with E coli & did not respond to antibiotx; currently on IV Ceftriaxone which should be susciptible but pt not clinically improved; temps low grade with severe right flank pain  suspect due to obstruction of right kidney & possibly infected renal cyst  Modify antibiotic to IV Cefepime bc it is highly renally excreted, follow urien culture, monitor temps/wbcs/right flank pain  Moderately severe right sided hydronephrosis: no stone on MRI, possibly due to pregnant uterus which will worsen as pregnancy progresses  May be responsible for persistent flank pain   rec PCN, recommend IR eval for right sided PCN  Consider drainage of infected  renal cyst-if no flank pain imorovement  8/13 MFM quick note-   The imaging regarding the right maternal kidney is markedly abnormal, with a  markedly dilated right ureter and enlarged right kidney; some images demonstrate an apparent fluid level within the ureter, and the abnormal ureter is discontinuous with the separate renal cyst   Imaging findings and localization of her pain to the right flank likely support a renal source of her infection,  8/14/2020 Attending :  Right sided CVA tenderness: pain control with Dilaudid PCA, Cefepime 2 g IV BID, possible renal cyst infection on MRI, IR drainage today with stable WBC, urine culture >100K E coli, blood culture no growth  Childcare issues CYS aware; children with FOB, npo, IV NS  8/14/2020 Urology:  UTI with pyelonephritis; right sided hydronephrosis; best option is for conservative treatment with antibx and pain control; would cont to monitor in the hospital for another 24 HRS prior to DC and treat with appropriate antibx  Option of PCI nephrostomy vs nephroureteral stent would be present for the remainder of pregnancy with pt understanding     Pertinent Labs/Diagnostic Results:   Results from last 7 days   Lab Units 08/12/20  1946   SARS-COV-2  Negative     Results from last 7 days   Lab Units 08/13/20  0612 08/12/20  1009   WBC Thousand/uL 10 47* 10 51*   HEMOGLOBIN g/dL 10 1* 10 9*   HEMATOCRIT % 30 6* 32 6*   PLATELETS Thousands/uL 204 208   NEUTROS ABS Thousands/µL 8 56* 9 38*         Results from last 7 days   Lab Units 08/13/20  0612 08/12/20  1009   SODIUM mmol/L 138 133*   POTASSIUM mmol/L 3 4* 3 0*   CHLORIDE mmol/L 107 100   CO2 mmol/L 20* 21   ANION GAP mmol/L 11 12   BUN mg/dL 3* 5   CREATININE mg/dL 0 44* 0 67   EGFR ml/min/1 73sq m 138 120   CALCIUM mg/dL 8 1* 8 5     Results from last 7 days   Lab Units 08/12/20  1009   AST U/L 18   ALT U/L 19   ALK PHOS U/L 94   TOTAL PROTEIN g/dL 6 7   ALBUMIN g/dL 2 3*   TOTAL BILIRUBIN mg/dL 0 42         Results from last 7 days   Lab Units 08/13/20  0612 08/12/20  1009   GLUCOSE RANDOM mg/dL 96 94           Results from last 7 days   Lab Units 08/14/20  0034   PROTIME seconds 14 2   INR  1 12   PTT seconds 40*             Results from last 7 days   Lab Units 08/12/20  1915   LACTIC ACID mmol/L 1 0       Results from last 7 days   Lab Units 08/12/20  1039   CLARITY UA  Slightly Cloudy   COLOR UA  Yellow   SPEC GRAV UA  1 025   PH UA  6 0   GLUCOSE UA mg/dl Negative   KETONES UA mg/dl 40 (2+)*   BLOOD UA  Small*   PROTEIN UA mg/dl Trace*   NITRITE UA  Positive*   BILIRUBIN UA  Negative   UROBILINOGEN UA E U /dl 0 2   LEUKOCYTES UA  Negative   WBC UA /hpf 4-10*   RBC UA /hpf 4-10*   BACTERIA UA /hpf Innumerable*   EPITHELIAL CELLS WET PREP /hpf Moderate*       Results from last 7 days   Lab Units 08/12/20  1939 08/12/20  1918 08/12/20  1009   BLOOD CULTURE  No Growth at 24 hrs  No Growth at 24 hrs   --    URINE CULTURE   --   --  >100,000 cfu/ml Escherichia coli*               Vital Signs:   Date/Time   Temp   Pulse   Resp   BP   SpO2   O2 Device   Patient Position - Orthostatic VS    08/14/20 0700   98 8 °F (37 1 °C)   97   18   112/67   --   --       Medications:   Scheduled Medications:  cefepime, 2,000 mg, Intravenous, Q12H    Continuous IV Infusions:  HYDROmorphone, , Intravenous, Continuous  sodium chloride, 125 mL/hr, Intravenous, Continuous  sodium chloride, 125 mL/hr, Intravenous, Continuous    PRN Meds:  acetaminophen, 975 mg, Oral, Q6H PRN      Discharge Plan: tbd  Network Utilization Review Department  [email or web address]  ATTENTION: Please call with any questions or concerns to [phone] and carefully listen to the prompts so that you are directed to the right person   All voicemails are confidential   Denys Delude all requests for admission clinical reviews, approved or denied determinations and any other requests to dedicated fax number below belonging to the campus where the patient is receiving treatment   List of dedicated fax numbers for the Facilities:  [address] DENIALS (Administrative/Medical Necessity) [phone]   [address]  (Maternity/NICU/Pediatrics) [phone] [address] [facility] [phone]   Amaris Poon [phone]   Radha Sadler [phone]   [facility] [phone]   Formerly named [facility] [address] [phone]   [facility]  [phone]   [address] [facility]  [address] [facility] [address] [facility] [phone]

## 2020-08-14 NOTE — PROGRESS NOTES
Progress Note - Maternal Fetal Medicine   Neelam Reyes 31 y o  female MRN: [id number]  Unit/Bed#: L&D 693-47 Encounter: [id number]    Assessment:  31 y o  [id number] at 21w3d admitted with right-sided pyelonephritis  Hospital day #3    Plan:  1  Right-sided CVA tenderness   - Pain controlled with dilaudid PCA   - Cefepime 2 g IV BID   - Afebrile overnight   - Possbile renal cyst infection on MRI   - IR drainage today   - Stable WBC   - Urine culture > 100k E coli   - Blood culture: no growth a 24 hours    2  Hypokalemia              - 3 0 --> 40 mEq PO --> 3 4  3  Childcare issues              - CYS aware              - children with FOB  4  NPO, IV NS  5  DVT ppx: SCDs on     Subjective/Objective       Subjective: Patient reports pain is better controlled with PCA  She has some neck pain, which she feels is from sleeping in the hospital bed  She denies any headaches, chest pain, shortness of breath, contractions, loss of fluid or vaginal bleeding  She last ate pizza around 8 PM last night, and has had nothing to eat since  Contractions: no  Loss of fluid: no  Vaginal bleeding: no  Fetal movement: yes    Pain: controlled with PCA  Tolerating PO: yes  Voiding: yes  Ambulating: yes  Headaches: no  Visual changes: no  Chest pain: no  Shortness of breath: no  Nausea: no  Vomiting/Diarrhea: no  Dysuria: no  Leg pain: no    Objective:     Vitals:   Temp:  [98 1 °F (36 7 °C)-100 2 °F (37 9 °C)] 98 1 °F (36 7 °C)  HR:  [] 86  Resp:  [18-20] 18  BP: (105-130)/(56-74) 108/74     Physical Exam:     Physical Exam      Fetal Assessment:  FHT: 140 bpm  [place]: no contractions    Lab, Imaging and other studies: I have personally reviewed pertinent reports        Lab Results   Component Value Date    WBC 10 47 (H) 08/13/2020    HGB 10 1 (L) 08/13/2020    HCT 30 6 (L) 08/13/2020    MCV 89 08/13/2020     08/13/2020       Lab Results   Component Value Date    CALCIUM 8 1 (L) 08/13/2020    K 3 4 (L) 08/13/2020    CO2 20 (L) 08/13/2020     08/13/2020    BUN 3 (L) 08/13/2020    CREATININE 0 44 (L) 08/13/2020       Lab Results   Component Value Date    ALT 19 08/12/2020    AST 18 08/12/2020    ALKPHOS 94 08/12/2020       Shelia Wilson MD  OBGYN, PGY-2  8/14/2020  6:32 AM

## 2020-08-15 ENCOUNTER — TELEPHONE (OUTPATIENT)
Dept: LABOR AND DELIVERY | Facility: HOSPITAL | Age: 30
End: 2020-08-15

## 2020-08-15 VITALS
SYSTOLIC BLOOD PRESSURE: 104 MMHG | OXYGEN SATURATION: 96 % | HEART RATE: 98 BPM | BODY MASS INDEX: 29.45 KG/M2 | RESPIRATION RATE: 17 BRPM | TEMPERATURE: 98.8 F | WEIGHT: 156 LBS | HEIGHT: 61 IN | DIASTOLIC BLOOD PRESSURE: 58 MMHG

## 2020-08-15 DIAGNOSIS — Z3A.21 21 WEEKS GESTATION OF PREGNANCY: Primary | ICD-10-CM

## 2020-08-15 DIAGNOSIS — O23.02 PYELONEPHRITIS AFFECTING PREGNANCY IN SECOND TRIMESTER: ICD-10-CM

## 2020-08-15 LAB
ANION GAP SERPL CALCULATED.3IONS-SCNC: 12 MMOL/L (ref 4–13)
BASOPHILS # BLD AUTO: 0.01 THOUSANDS/ΜL (ref 0–0.1)
BASOPHILS NFR BLD AUTO: 0 % (ref 0–1)
BUN SERPL-MCNC: 4 MG/DL (ref 5–25)
CALCIUM SERPL-MCNC: 8 MG/DL (ref 8.3–10.1)
CHLORIDE SERPL-SCNC: 107 MMOL/L (ref 100–108)
CO2 SERPL-SCNC: 20 MMOL/L (ref 21–32)
CREAT SERPL-MCNC: 0.48 MG/DL (ref 0.6–1.3)
EOSINOPHIL # BLD AUTO: 0.02 THOUSAND/ΜL (ref 0–0.61)
EOSINOPHIL NFR BLD AUTO: 0 % (ref 0–6)
ERYTHROCYTE [DISTWIDTH] IN BLOOD BY AUTOMATED COUNT: 13.8 % (ref 11.6–15.1)
GFR SERPL CREATININE-BSD FRML MDRD: 134 ML/MIN/1.73SQ M
GLUCOSE SERPL-MCNC: 92 MG/DL (ref 65–140)
HCT VFR BLD AUTO: 27.5 % (ref 34.8–46.1)
HGB BLD-MCNC: 8.9 G/DL (ref 11.5–15.4)
IMM GRANULOCYTES # BLD AUTO: 0.06 THOUSAND/UL (ref 0–0.2)
IMM GRANULOCYTES NFR BLD AUTO: 1 % (ref 0–2)
LYMPHOCYTES # BLD AUTO: 1.53 THOUSANDS/ΜL (ref 0.6–4.47)
LYMPHOCYTES NFR BLD AUTO: 16 % (ref 14–44)
MCH RBC QN AUTO: 29.1 PG (ref 26.8–34.3)
MCHC RBC AUTO-ENTMCNC: 32.4 G/DL (ref 31.4–37.4)
MCV RBC AUTO: 90 FL (ref 82–98)
MONOCYTES # BLD AUTO: 0.82 THOUSAND/ΜL (ref 0.17–1.22)
MONOCYTES NFR BLD AUTO: 9 % (ref 4–12)
NEUTROPHILS # BLD AUTO: 7.05 THOUSANDS/ΜL (ref 1.85–7.62)
NEUTS SEG NFR BLD AUTO: 74 % (ref 43–75)
NRBC BLD AUTO-RTO: 0 /100 WBCS
PLATELET # BLD AUTO: 212 THOUSANDS/UL (ref 149–390)
PMV BLD AUTO: 10.1 FL (ref 8.9–12.7)
POTASSIUM SERPL-SCNC: 2.8 MMOL/L (ref 3.5–5.3)
RBC # BLD AUTO: 3.06 MILLION/UL (ref 3.81–5.12)
SODIUM SERPL-SCNC: 139 MMOL/L (ref 136–145)
WBC # BLD AUTO: 9.49 THOUSAND/UL (ref 4.31–10.16)

## 2020-08-15 PROCEDURE — 80048 BASIC METABOLIC PNL TOTAL CA: CPT | Performed by: OBSTETRICS & GYNECOLOGY

## 2020-08-15 PROCEDURE — 99232 SBSQ HOSP IP/OBS MODERATE 35: CPT | Performed by: OBSTETRICS & GYNECOLOGY

## 2020-08-15 PROCEDURE — 85025 COMPLETE CBC W/AUTO DIFF WBC: CPT | Performed by: OBSTETRICS & GYNECOLOGY

## 2020-08-15 PROCEDURE — NC001 PR NO CHARGE: Performed by: UROLOGY

## 2020-08-15 PROCEDURE — 99233 SBSQ HOSP IP/OBS HIGH 50: CPT | Performed by: INTERNAL MEDICINE

## 2020-08-15 RX ORDER — POLYETHYLENE GLYCOL 3350 17 G/17G
17 POWDER, FOR SOLUTION ORAL DAILY
Qty: 14 EACH | Refills: 0 | Status: SHIPPED | OUTPATIENT
Start: 2020-08-15 | End: 2020-09-10

## 2020-08-15 RX ORDER — CEFPODOXIME PROXETIL 200 MG/1
400 TABLET, FILM COATED ORAL 2 TIMES DAILY WITH MEALS
Status: DISCONTINUED | OUTPATIENT
Start: 2020-08-15 | End: 2020-08-15 | Stop reason: HOSPADM

## 2020-08-15 RX ORDER — POTASSIUM CHLORIDE 20 MEQ/1
40 TABLET, EXTENDED RELEASE ORAL ONCE
Status: COMPLETED | OUTPATIENT
Start: 2020-08-15 | End: 2020-08-15

## 2020-08-15 RX ORDER — CEFPODOXIME PROXETIL 200 MG/1
400 TABLET, FILM COATED ORAL 2 TIMES DAILY
Qty: 56 TABLET | Refills: 0 | Status: SHIPPED | OUTPATIENT
Start: 2020-08-15 | End: 2020-08-29

## 2020-08-15 RX ORDER — DOCUSATE SODIUM 100 MG/1
100 CAPSULE, LIQUID FILLED ORAL 2 TIMES DAILY
Qty: 10 CAPSULE | Refills: 0 | Status: SHIPPED | OUTPATIENT
Start: 2020-08-15 | End: 2020-09-10

## 2020-08-15 RX ORDER — CEFPODOXIME PROXETIL 200 MG/1
400 TABLET, FILM COATED ORAL 2 TIMES DAILY WITH MEALS
Qty: 56 TABLET | Refills: 0 | Status: SHIPPED | OUTPATIENT
Start: 2020-08-15 | End: 2020-08-29

## 2020-08-15 RX ORDER — CEFPODOXIME PROXETIL 200 MG/1
200 TABLET, FILM COATED ORAL 2 TIMES DAILY
Qty: 28 TABLET | Refills: 0 | Status: SHIPPED | OUTPATIENT
Start: 2020-08-15 | End: 2020-08-29

## 2020-08-15 RX ORDER — ACETAMINOPHEN 325 MG/1
975 TABLET ORAL EVERY 6 HOURS SCHEDULED
Qty: 30 TABLET | Refills: 0 | Status: SHIPPED | OUTPATIENT
Start: 2020-08-15 | End: 2020-09-10

## 2020-08-15 RX ORDER — DOCUSATE SODIUM 100 MG/1
100 CAPSULE, LIQUID FILLED ORAL 2 TIMES DAILY
Qty: 30 CAPSULE | Refills: 0 | Status: SHIPPED | OUTPATIENT
Start: 2020-08-15 | End: 2020-09-10

## 2020-08-15 RX ADMIN — POTASSIUM CHLORIDE 40 MEQ: 1500 TABLET, EXTENDED RELEASE ORAL at 09:15

## 2020-08-15 RX ADMIN — DOCUSATE SODIUM 100 MG: 100 CAPSULE, LIQUID FILLED ORAL at 09:15

## 2020-08-15 RX ADMIN — ACETAMINOPHEN 975 MG: 325 TABLET ORAL at 05:53

## 2020-08-15 RX ADMIN — ACETAMINOPHEN 975 MG: 325 TABLET ORAL at 00:07

## 2020-08-15 RX ADMIN — CEFEPIME HYDROCHLORIDE 2000 MG: 2 INJECTION, POWDER, FOR SOLUTION INTRAVENOUS at 04:31

## 2020-08-15 RX ADMIN — SODIUM CHLORIDE 125 ML/HR: 0.9 INJECTION, SOLUTION INTRAVENOUS at 04:10

## 2020-08-15 NOTE — DISCHARGE INSTRUCTIONS
Infección del riñón   LO QUE NECESITA SABER:   Mateo infección de David Beverley, o pielonefritis es mateo infección bacteriana  La infección usualmente comienza en barnes vejiga o uretra y se pasa a barnes David Beverley  [place] o ambos riñones podrían estar infectados  INSTRUCCIONES SOBRE EL TILA HOSPITALARIA:   Busque atención médica de inmediato si:   · Usted tiene fiebre o escalofríos  · Usted no puede dejar de vomitar  · Usted tiene dolor intenso en el abdomen, en la parte inferior de la espalda o en los costados  Pregúntele a barnes Windy Gault vitaminas y minerales son adecuados para usted  · Usted continúa teniendo fiebre después de coleman los antibióticos por 3 días  · Usted siente dolor al orinar, incluso después del tratamiento  · [place] signos y síntomas regresan  · Usted tiene preguntas o inquietudes acerca de barnes condición o cuidado  Medicamentos:  Es posible que usted necesite alguno de los siguientes:  · Antibióticos  tratan barnes infección bacteriana  · Acetaminofeno:  kashif el dolor y baja la fiebre  Está disponible sin receta médica  Pregunte la cantidad y la frecuencia con que debe tomarlos  [address]  Jenny las etiquetas de todos los demás medicamentos que esté usando para saber si también contienen acetaminofén, o pregunte a barnes médico o farmacéutico  El acetaminofén puede causar daño en el hígado cuando no se gerardo de forma correcta  No use más de 4 gramos (4000 miligramos) en total de acetaminofeno en un día  · AINEs (Analgésicos antiinflamatorios no esteroides) erika el ibuprofeno, ayudan a disminuir la inflamación, el dolor y la [place]  Deepak medicamento esta disponible con o sin mateo receta médica  Los AINEs pueden causar sangrado estomacal o problemas renales en ciertas personas  Si usted esta tomando un anticoágulante,  siempre  pregunte si los AINEs son seguros para usted  Siempre jenny la etiqueta de deepak medicamento y [place] instrucciones   No administre deepak medicamento a niños menores de 6 meses de aurelio sin antes obtener la autorización de barnes médico      · Un medicamento con receta para el dolor  podrían ser Izell Mass  Pregunte cómo coleman estos medicamentos de mateo forma payne  · [place] thelma medicamentos erika se le haya indicado  Consulte con barnes médico si usted ruth que barnes medicamento no le está ayudando o si presenta efectos secundarios  Infórmele si es alérgico a algún medicamento  Mantenga mateo lista actualizada de los [place], las vitaminas y los productos herbales que gerardo  Incluya los siguientes datos de los medicamentos: cantidad, frecuencia y motivo de administración  Traiga con usted la lista o los envases de la píldoras a thelma citas de seguimiento  Lleve la lista de los medicamentos con usted en roya de mateo emergencia  [place] líquidos según thelma indicaciones:  Es posible que usted necesite coleman líquidos adicionales para ayudar a limpiar thelma riñones y barnes sistema urinario  El agua es el mejor líquido para coleman  Pregunte a barnes médico sobre la cantidad de líquido que necesita coleman todos los días y cuáles le recomienda  Orine darling pronto erika sienta la necesidad de hacerlo:  [place] ayudará a eliminar las bacterias de barnes sistema urinario  No espere ni aguante barnes orina por [employer] [employer]  Limpie el área de los genitales a diario con agua y [place]  Límpiese de adelante hacia atrás después de orinar o de tener mateo evacuación intestinal  Use ropa interior de algodón  Las telas erika el nylon y el poliéster pueden [employer]  [place] puede aumentar barnes riesgo de mateo infección  Orine dentro de 15 minutos después de arlen tenido [employer]  Acuda a thelma consultas de control con barnes médico según le indicaron  Anote thelma preguntas para que se acuerde de hacerlas kalyan thelma visitas  © 2017 [address] Information is for End User's use only and may not be sold, redistributed or otherwise used for commercial purposes   All illustrations and images included in CareNotes® are the copyrighted property of A D A M , Inc  or Patricio Reynolds  Esta información es sólo para uso en educación  Barnes intención no es darle un consejo médico sobre enfermedades o tratamientos  Colsulte con barnes Deedee Angst farmacéutico antes de seguir cualquier régimen médico para saber si es seguro y efectivo para usted

## 2020-08-15 NOTE — TELEPHONE ENCOUNTER
Patient called stating that her antibiotics were not available at the pharmacy  Placed antibiotic order again, patient aware      Siomara Dale MD  OB/GYN PGY-2  8/15/2020  6:00 PM

## 2020-08-15 NOTE — PLAN OF CARE
Problem: PAIN - ADULT  Goal: Verbalizes/displays adequate comfort level or baseline comfort level  Description: Interventions:  - Encourage patient to monitor pain and request assistance  - Assess pain using appropriate pain scale  - Administer analgesics based on type and severity of pain and evaluate response  - Implement non-pharmacological measures as appropriate and evaluate response  - Consider cultural and social influences on pain and pain management  - Notify physician/advanced practitioner if interventions unsuccessful or patient reports new pain  Outcome: Adequate for Discharge     Problem: INFECTION - ADULT  Goal: Absence or prevention of progression during hospitalization  Description: INTERVENTIONS:  - Assess and monitor for signs and symptoms of infection  - Monitor lab/diagnostic results  - Monitor all insertion sites, i e  indwelling lines, tubes, and drains  - Monitor endotracheal if appropriate and nasal secretions for changes in amount and color  - [place] appropriate cooling/warming therapies per order  - Administer medications as ordered  - Instruct and encourage patient and family to use good hand hygiene technique  - Identify and instruct in appropriate isolation precautions for identified infection/condition  Outcome: Adequate for Discharge  Goal: Absence of fever/infection during neutropenic period  Description: INTERVENTIONS:  - Monitor WBC    Outcome: Adequate for Discharge     Problem: SAFETY ADULT  Goal: Patient will remain free of falls  Description: INTERVENTIONS:  - Assess patient frequently for physical needs  -  Identify cognitive and physical deficits and behaviors that affect risk of falls    -  [place] fall precautions as indicated by assessment   - Educate patient/family on patient safety including physical limitations  - Instruct patient to call for assistance with activity based on assessment  - Modify environment to reduce risk of injury  - Consider OT/PT consult to assist with strengthening/mobility  Outcome: Adequate for Discharge  Goal: Maintain or return to baseline ADL function  Description: INTERVENTIONS:  -  Assess patient's ability to carry out ADLs; assess patient's baseline for ADL function and identify physical deficits which impact ability to perform ADLs (bathing, care of mouth/teeth, toileting, grooming, dressing, etc )  - Assess/evaluate cause of self-care deficits   - Assess range of motion  - Assess patient's mobility; develop plan if impaired  - Assess patient's need for assistive devices and provide as appropriate  - Encourage maximum independence but intervene and supervise when necessary  - Involve family in performance of ADLs  - Assess for home care needs following discharge   - Consider OT consult to assist with ADL evaluation and planning for discharge  - Provide patient education as appropriate  Outcome: Adequate for Discharge  Goal: Maintain or return mobility status to optimal level  Description: INTERVENTIONS:  - Assess patient's baseline mobility status (ambulation, transfers, stairs, etc )    - Identify cognitive and physical deficits and behaviors that affect mobility  - Identify mobility aids required to assist with transfers and/or ambulation (gait belt, sit-to-stand, lift, walker, cane, etc )  - [place] fall precautions as indicated by assessment  - Record patient progress and toleration of activity level on Mobility SBAR; progress patient to next Phase/Stage  - Instruct patient to call for assistance with activity based on assessment  - Consider rehabilitation consult to assist with strengthening/weightbearing, etc   Outcome: Adequate for Discharge     Problem: Knowledge Deficit  Goal: Patient/family/caregiver demonstrates understanding of disease process, treatment plan, medications, and discharge instructions  Description: Complete learning assessment and assess knowledge base    Interventions:  - Provide teaching at level of understanding  - Provide teaching via preferred learning methods  Outcome: Adequate for Discharge     Problem: DISCHARGE PLANNING  Goal: Discharge to home or other facility with appropriate resources  Description: INTERVENTIONS:  - Identify barriers to discharge w/patient and caregiver  - Arrange for needed discharge resources and transportation as appropriate  - Identify discharge learning needs (meds, wound care, etc )  - Arrange for interpretive services to assist at discharge as needed  - Refer to Case Management Department for coordinating discharge planning if the patient needs post-hospital services based on physician/advanced practitioner order or complex needs related to functional status, cognitive ability, or social support system  Outcome: Adequate for Discharge

## 2020-08-15 NOTE — CONSULTS
[address] NOTE   Admission Date: 2020    Patient Identifiers: Larry Palafox (MRN: [id number])  Service Requesting Consultation:Veronica Peña MD  Service Providing Consultation:  Urology, Sandre Skiff, MD  Date of Service: 8/15/2020    Reason for Consultation:  Pyelonephritis with hydronephrosis in pregnancy    History of Present Illness:     Larry Palafox is a 31 y o  female who is 21 weeks pregnant with a history of recurrent urinary tract infection with E coli  She presented with right-sided pain and possible pyelonephritis  She had an ultrasound showing moderate hydronephrosis  An MRI was done showing right-sided hydronephrosis with no evidence of stone  Urine culture on  was positive for greater than 100,000 E coli  WBC 10 51  Creatinine is 0 44  There was some question whether she needed a percutaneous nephrostomy and interventional radiology was consulted  At present she is afebrile  She is still experiencing much less right flank pain  There is no nausea or vomiting    She is tolerating a regular diet well    Past Medical, Past Surgical History:     Past Medical History:   Diagnosis Date    Chlamydia 2010   :    Past Surgical History:   Procedure Laterality Date     SECTION  , , 2017   :    Medications, Allergies:     Current Facility-Administered Medications:     acetaminophen (TYLENOL) tablet 975 mg, 975 mg, Oral, Q6H [facility], Charmayne New, MD, 975 mg at 08/15/20 0553    cefepime (MAXIPIME) 2,000 mg in dextrose 5 % 50 mL IVPB, 2,000 mg, Intravenous, Q12H, Samantha Grace MD, Last Rate: 100 mL/hr at 08/15/20 0431, 2,000 mg at 08/15/20 0431    docusate sodium (COLACE) capsule 100 mg, 100 mg, Oral, BID, Jos Hamlin DO, 100 mg at 08/15/20 0915    oxyCODONE (ROXICODONE) immediate release tablet 10 mg, 10 mg, Oral, Q4H PRN, Charmayne New, MD    oxyCODONE (ROXICODONE) IR tablet 5 mg, 5 mg, Oral, Q4H PRN, Mariya Sotelo MD, 5 mg at 20 1828    sodium chloride 0 9 % infusion, 125 mL/hr, Intravenous, Continuous, Eden Love DO, Last Rate: 125 mL/hr at 08/15/20 0410, 125 mL/hr at 08/15/20 0410    sodium chloride 0 9 % infusion, 125 mL/hr, Intravenous, Continuous, Leighton Springer DO, Last Rate: 125 mL/hr at 20 1034, 125 mL/hr at 20 1034    Allergies: Allergies   Allergen Reactions    Meperidine Rash   :    Social and Family History:   Social History:   Social History     Tobacco Use    Smoking status: Former Smoker     Packs/day: 0 20     Types: Cigarettes     Last attempt to quit: 2018     Years since quittin 6    Smokeless tobacco: Never Used   Substance Use Topics    Alcohol use: Never    Drug use: Never     Social History     Tobacco Use   Smoking Status Former Smoker    Packs/day: 0 20    Types: Cigarettes    Last attempt to quit: 2018    Years since quittin 6   Smokeless Tobacco Never Used       Family History:  Family History   Problem Relation Age of Onset    Cancer Mother         uterine    No Known Problems Father     No Known Problems Sister     No Known Problems Brother     Diabetes Maternal Grandmother     No Known Problems Paternal Grandmother     No Known Problems Sister     No Known Problems Brother     No Known Problems Brother     No Known Problems Brother     Cancer Son         renal, Wilms tumor    Breast cancer Neg Hx    :     Review of Systems:     General: Fever, chills, or night sweats: positive  Cardiac: Negative for chest pain  Pulmonary: Negative for shortness of breath  Gastrointestinal: Abdominal pain negative  Nausea, vomiting, or diarrhea negative,  Genitourinary: See HPI above  Patient does not have hematuria  All other systems queried were negative  Physical Exam:   General: Patient is pleasant and in NAD   Awake and alert  /58 (BP Location: Left arm)   Pulse 98   Temp 98 8 °F (37 1 °C) (Oral)   Resp 17   Ht 5' 1" (1 549 m)   Wt 70 8 kg (156 lb)   LMP 03/17/2020 (Exact Date)   SpO2 96%   BMI 29 48 kg/m²   HEENT:  No scleral icterus the conjunctivae are clear  Constitutional:  Pleasant cooperative 79-year-old female  Cardiac: Peripheral edema: negative  Pulmonary: Non-labored breathing  Abdomen: Soft, non-tender, non-distended  No surgical scars  No masses, tenderness, hernias noted  Genitourinary:  Negative CVA tenderness, negative suprapubic tenderness  Extremities:  Moves all extremities without any deformity or weakness  Neurological:  No numbness or tingling with a nonfocal neurologic exam  Psychiatric:  Behavior affect and mood normal      Labs:     Lab Results   Component Value Date    HGB 8 9 (L) 08/15/2020    HCT 27 5 (L) 08/15/2020    WBC 9 49 08/15/2020     08/15/2020   ]    Lab Results   Component Value Date    K 2 8 (L) 08/15/2020     08/15/2020    CO2 20 (L) 08/15/2020    BUN 4 (L) 08/15/2020    CREATININE 0 48 (L) 08/15/2020    CALCIUM 8 0 (L) 08/15/2020   ]    Imaging:   I personally reviewed the images and report of the following studies, and reviewed them with the patient:  MRI OF THE ABDOMEN WITHOUT CONTRAST APPENDICITIS   IMPRESSION:  1  No evidence of acute appendicitis  2   Right-sided hydronephrosis, likely related to pregnancy, though there is mild perinephric stranding suggesting an underlying acute process such as pyelonephritis  A right renal cyst has significantly increased in size from February and appears   complex with T2 hypointensity and thickened wall  This is likely superinfected  ASSESSMENT:     1  Urinary tract infection with pyelonephritis  2  Mild right-sided hydronephrosis, likely physiologic  3  21 weeks gestation     PLAN:     Continued conservative treatment with antibiotics and pain control at this juncture in the absence of any obvious obstruction    May consider discharge from urologic standpoint      Regan Smallwood MD

## 2020-08-15 NOTE — TELEPHONE ENCOUNTER
Sent colace and Miralax prescriptions to patient's preferred pharmacy      Fariha Yancey MD  OB/GYN PGY-2  8/15/2020  2:32 PM

## 2020-08-15 NOTE — PROGRESS NOTES
Progress Note - Infectious Disease   Charmian Lundborg 31 y o  female MRN: [id number]  Unit/Bed#: L&D 885-63 Encounter: [id number]      Impression/Recommendations:  1  Right-sided pyelonephritis  Patient did not respond to IV ceftriaxone but response to IV cefepime  This may be all secondary to less than optimal urinary secretion of ceftriaxone  Consider effect of right ureteral obstruction  Consider infected renal cyst   With patient clinically improving, however completed empiric antibiotic course and monitor for continued response  Change antibiotic to p o  Cefpodoxime 400 mg bid  Monitor temperature/WBC  Monitor right flank pain  Treat x 14 days total      2  Moderately severe right-sided hydronephrosis   No obvious stone on MRI or ultrasound   This may be all compression from pregnant uterus   Patient is only at 21 week pregnancy   I suspect that the ureteral compression will worsen as a pregnancy progresses  I suspect the patient will eventually need ureteral stenting, either PCN or ureteral stent as pregnancy progresses  Plan for conservative management at present noted  Given clinical improvement, it is reasonable for now  Antibiotic plan as in above  Monitor right flank pain  Urology follow-up      3  Right renal cysts, with evidence of possible cyst infection on MRI   With patient clinically responding to antibiotic change above, will monitor for now  Antibiotic plan as in above      4  Pregnancy, at 24 weeks gestational age      Discussed with patient in detail regarding the above plan  Discussed with OB gyn service  Okay for discharge from ID viewpoint      Antibiotics:  Cefepime # 3  Antibiotic # 4      Subjective:  Patient feels well  Right flank pain continues to improve  Temperature stays down  No further chills  She is tolerating antibiotic well  No nausea, vomiting or diarrhea      Objective:  Vitals:  Temp:  [97 8 °F (36 6 °C)-99 2 °F (37 3 °C)] 98 8 °F (37 1 °C)  HR:  [] 98  Resp:  [17-20] 17  BP: ()/(58-70) 104/58  SpO2:  [96 %] 96 %  Temp (24hrs), Av 5 °F (36 9 °C), Min:97 8 °F (36 6 °C), Max:99 2 °F (37 3 °C)  Current: Temperature: 98 8 °F (37 1 °C)    Physical Exam:     General: Awake, alert, cooperative, no distress  Neck:  Supple  No mass  No lymphadenopathy  Lungs: Expansion symmetric, no rales, no wheezing, respirations unlabored  Heart:  Regular rate and rhythm, S1 and S2 normal, no murmur  Abdomen: Soft, stable distension due to large sutures, improving right flank tenderness, bowel sounds active all four quadrants, no masses, no organomegaly  Extremities: Trace edema  No erythema/warmth  No ulcer  Nontender to palpation  Skin:  No rash  Neuro: Moves all extremities  Invasive Devices     None                 Labs studies:   I have personally reviewed pertinent labs  Results from last 7 days   Lab Units 08/15/20  0546 20  0612 20  1009   POTASSIUM mmol/L 2 8* 3 4* 3 0*   CHLORIDE mmol/L 107 107 100   CO2 mmol/L 20* 20* 21   BUN mg/dL 4* 3* 5   CREATININE mg/dL 0 48* 0 44* 0 67   EGFR ml/min/1 73sq m 134 138 120   CALCIUM mg/dL 8 0* 8 1* 8 5   AST U/L  --   --  18   ALT U/L  --   --  19   ALK PHOS U/L  --   --  94     Results from last 7 days   Lab Units 08/15/20  0546 20  0612 20  1009   WBC Thousand/uL 9 49 10 47* 10 51*   HEMOGLOBIN g/dL 8 9* 10 1* 10 9*   PLATELETS Thousands/uL 212 204 208     Results from last 7 days   Lab Units 20  1939 20  1918 20  1009   BLOOD CULTURE  No Growth at 48 hrs  No Growth at 48 hrs  --    URINE CULTURE   --   --  >100,000 cfu/ml Escherichia coli*       Imaging Studies:   I have personally reviewed pertinent imaging study reports and images in PACS  EKG, Pathology, and Other Studies:   I have personally reviewed pertinent reports

## 2020-08-15 NOTE — PLAN OF CARE
Problem: PAIN - ADULT  Goal: Verbalizes/displays adequate comfort level or baseline comfort level  Description: Interventions:  - Encourage patient to monitor pain and request assistance  - Assess pain using appropriate pain scale  - Administer analgesics based on type and severity of pain and evaluate response  - Implement non-pharmacological measures as appropriate and evaluate response  - Consider cultural and social influences on pain and pain management  - Notify physician/advanced practitioner if interventions unsuccessful or patient reports new pain  8/15/2020 1132 by Moshe Olsen RN  Outcome: Completed  8/15/2020 1117 by Moshe Olsen RN  Outcome: Adequate for Discharge     Problem: INFECTION - ADULT  Goal: Absence or prevention of progression during hospitalization  Description: INTERVENTIONS:  - Assess and monitor for signs and symptoms of infection  - Monitor lab/diagnostic results  - Monitor all insertion sites, i e  indwelling lines, tubes, and drains  - Monitor endotracheal if appropriate and nasal secretions for changes in amount and color  - [place] appropriate cooling/warming therapies per order  - Administer medications as ordered  - Instruct and encourage patient and family to use good hand hygiene technique  - Identify and instruct in appropriate isolation precautions for identified infection/condition  8/15/2020 1132 by Moshe Olsen RN  Outcome: Completed  8/15/2020 1117 by Moshe Olsen RN  Outcome: Adequate for Discharge  Goal: Absence of fever/infection during neutropenic period  Description: INTERVENTIONS:  - Monitor WBC    8/15/2020 1132 by Moshe Olsen RN  Outcome: Completed  8/15/2020 1117 by Moshe Olsen RN  Outcome: Adequate for Discharge     Problem: SAFETY ADULT  Goal: Patient will remain free of falls  Description: INTERVENTIONS:  - Assess patient frequently for physical needs  -  Identify cognitive and physical deficits and behaviors that affect risk of falls   -  [place] fall precautions as indicated by assessment   - Educate patient/family on patient safety including physical limitations  - Instruct patient to call for assistance with activity based on assessment  - Modify environment to reduce risk of injury  - Consider OT/PT consult to assist with strengthening/mobility  8/15/2020 1132 by Claudio Hale RN  Outcome: Completed  8/15/2020 1117 by Claudio Hale RN  Outcome: Adequate for Discharge  Goal: Maintain or return to baseline ADL function  Description: INTERVENTIONS:  -  Assess patient's ability to carry out ADLs; assess patient's baseline for ADL function and identify physical deficits which impact ability to perform ADLs (bathing, care of mouth/teeth, toileting, grooming, dressing, etc )  - Assess/evaluate cause of self-care deficits   - Assess range of motion  - Assess patient's mobility; develop plan if impaired  - Assess patient's need for assistive devices and provide as appropriate  - Encourage maximum independence but intervene and supervise when necessary  - Involve family in performance of ADLs  - Assess for home care needs following discharge   - Consider OT consult to assist with ADL evaluation and planning for discharge  - Provide patient education as appropriate  8/15/2020 1132 by Claudio Hale RN  Outcome: Completed  8/15/2020 1117 by Claudio Hale RN  Outcome: Adequate for Discharge  Goal: Maintain or return mobility status to optimal level  Description: INTERVENTIONS:  - Assess patient's baseline mobility status (ambulation, transfers, stairs, etc )    - Identify cognitive and physical deficits and behaviors that affect mobility  - Identify mobility aids required to assist with transfers and/or ambulation (gait belt, sit-to-stand, lift, walker, cane, etc )  - [place] fall precautions as indicated by assessment  - Record patient progress and toleration of activity level on Mobility SBAR; progress patient to next Phase/Stage  - Instruct patient to call for assistance with activity based on assessment  - Consider rehabilitation consult to assist with strengthening/weightbearing, etc   8/15/2020 1132 by Rachel Tello RN  Outcome: Completed  8/15/2020 1117 by Rachel Tello RN  Outcome: Adequate for Discharge     Problem: Knowledge Deficit  Goal: Patient/family/caregiver demonstrates understanding of disease process, treatment plan, medications, and discharge instructions  Description: Complete learning assessment and assess knowledge base    Interventions:  - Provide teaching at level of understanding  - Provide teaching via preferred learning methods  8/15/2020 1132 by Rachel Tello RN  Outcome: Completed  8/15/2020 1117 by Rachel Tello RN  Outcome: Adequate for Discharge     Problem: DISCHARGE PLANNING  Goal: Discharge to home or other facility with appropriate resources  Description: INTERVENTIONS:  - Identify barriers to discharge w/patient and caregiver  - Arrange for needed discharge resources and transportation as appropriate  - Identify discharge learning needs (meds, wound care, etc )  - Arrange for interpretive services to assist at discharge as needed  - Refer to Case Management Department for coordinating discharge planning if the patient needs post-hospital services based on physician/advanced practitioner order or complex needs related to functional status, cognitive ability, or social support system  8/15/2020 1132 by Rachel Tello RN  Outcome: Completed  8/15/2020 1117 by Rachel Tello RN  Outcome: Adequate for Discharge

## 2020-08-15 NOTE — PROGRESS NOTES
Progress Note - OB/GYN   Jacinda Gary 31 y o  female MRN: [id number]  Unit/Bed#: L&D 314-01 Encounter: [id number]    Jacinda Gary is a patient of [facility]    Assessment:  Hospital Day: 4 s/p IV antibiotics for presumed R pyelonephritis, stable, and doing well, improving with antibiotics    Plan:  1  Right pyelonephritis   - Continue day 4 of IV treatment, day 2 of cefepime   - Per ID note, pt will continue with 14d PO cefpodoxime    - Consider discharge home today with precautions and outpatient follow up   - White count downtrending, last febrile 8/12/20 before midnight to 100 6*F   - Per IR and urology, no need for surgical intervention, pt is s/p dilaudid PCA and was transitioned to PO medications, if her pain worsens, would consider PO pain medication for discharge vs re-consult if pain is refractory to PO medications   - WBC 10 5 -> 10 4 -> 9 4k,a febrile   - K+ 2 8 will repelete with 40mEq PO this AM  2  Disposition   - VSS   - Anticipate discharge home today with outpatient follow up and PO cefpodoxime per ID recommendations      Subjective/Objective     Chief Complaint:     Back pain is mild    Subjective:   Pain: yes  Tolerating Oral Intake: yes  Voiding: yes  Flatus: yes  Bowel Movement: yes  Ambulating: yes  Chest Pain: no  Shortness of Breath: no  Leg Pain/Discomfort: no    Objective:   Vitals:   /67 (BP Location: Left arm)   Pulse 93   Temp 97 8 °F (36 6 °C) (Oral)   Resp 20   Ht 5' 1" (1 549 m)   Wt 70 8 kg (156 lb)   LMP 03/17/2020 (Exact Date)   SpO2 96%   BMI 29 48 kg/m²   Body mass index is 29 48 kg/m²    I/O       08/13 0701 - 08/14 0700 08/14 0701 - 08/15 0700    I V  (mL/kg) 0 8 (0) 1002 2 (14 3)    Total Intake(mL/kg) 0 8 (0) 1002 2 (14 3)    Urine (mL/kg/hr) 450 (0 3) 600 (0 4)    Total Output 450 600    Net -449 2 +402 2              Lab Results   Component Value Date    WBC 10 47 (H) 08/13/2020    HGB 10 1 (L) 08/13/2020    HCT 30 6 (L) 08/13/2020    MCV 89 08/13/2020    PLT 204 08/13/2020       Meds/Allergies   Current Facility-Administered Medications   Medication Dose Route Frequency    acetaminophen (TYLENOL) tablet 975 mg  975 mg Oral Q6H [address]    cefepime (MAXIPIME) 2,000 mg in dextrose 5 % 50 mL IVPB  2,000 mg Intravenous Q12H    docusate sodium (COLACE) capsule 100 mg  100 mg Oral BID    oxyCODONE (ROXICODONE) immediate release tablet 10 mg  10 mg Oral Q4H PRN    oxyCODONE (ROXICODONE) IR tablet 5 mg  5 mg Oral Q4H PRN    sodium chloride 0 9 % infusion  125 mL/hr Intravenous Continuous    sodium chloride 0 9 % infusion  125 mL/hr Intravenous Continuous       Physical Exam:  General: in no apparent distress, well developed and well nourished and non-toxic  Cardiovascular: Cor RRR  Lungs: clear to auscultation bilaterally  Abdomen: abdomen is soft without significant tenderness, masses, organomegaly or guarding, mild right CVA tenderness, markedly improved from prior exams  Fundus: S=D  Lower extremeties: nontender    Chun Myers DO  PGY-4 OB/GYN   8/15/2020 3:45 AM

## 2020-08-17 NOTE — UTILIZATION REVIEW
Charmayne New, MD    Resident    OB/GYN    Discharge Summary    Attested    Date of Service:  2020  6:57 AM                Attested         Attestation signed by Lasha Saavedra DO at 8/15/2020 11:15 AM   Attending/Teaching Physician Statement  I have participated in the care of this patient during this hospitalization and agree with the discharge summary  Pt to continue oral antibiotic course over the next 2 wks per ID recommendations      Lasha Saavedra DO  08/15/20  11:14 AM              Show:Clear all  [x]Manual[x]Template[]Copied    Added by:  [x]Elzbieta Lai MD[x]Jos Hull DO    []Ulysses for details  Discharge Summary - Larry Palafox 31 y o  female MRN: [id number]     Unit/Bed#: L&D 314-01 Encounter: [id number]     Admission Date: 2020      Discharge Date: 8/15/20     Admitting Diagnosis:       Patient Active Problem List   Diagnosis    Fetal ultrasound    Genetic screening    Contraception management    Previous c/s x3    Prior IUGR    History of PTD @32 weeks    Obesity in pregnancy    UTI in pregnancy    Prenatal care in second trimester    Acute cystitis with hematuria    21 weeks gestation of pregnancy    Nephrolithiasis    Currently pregnant    Anemia         Discharge Diagnosis:   Right hydronephrosis, right renal cyst     Procedures:   None     Admitting Attending: Dr Joe Springer  Discharge Attending: Dr Amna Torres:      Larry Palafox is a 31 y o  [id number] who was admitted at 21w1d for suspected right pyelonephritis after having significant right CVA tenderness and a reepat positive UTI after a course of macrobid antibiotics prescribed from [facility] hospital providers (completed 8/3/20)  E  Coli >100k and susceptible to macrobid   labor workup was negative  She was admitted and treated with ceftriaxone 1g and this was increased to 2g on hospital day 2   An MRI was performed and ruled out appendicitis, an increased size of her known right renal cyst from approximately 1x1 to 3x3cm was noted and this was supsected to be superinfected  She also had significant right asymmetric hydronephrosis  She had a Tmax of 100 6*F on 8/12/20 at 2300  White count was 10 51k and downtrended to 10 47k  She continued to receive IV fluids, COVID testing and lactic acid were normal       Infectious disease was consulted on HD #2 after her pain worsened despite treatment  Ceftriaxone was discontinued and cefepime 2g was started  IR and urology were consulted  Originally she was thought to benefit from interventional PCN or stenting to drain as her pain was thought to be a result of the significant hydronephrosis  The right renal cyst was not thought to be grossly infected or require drainage as it is unlikely the cause of her symptoms  She declined stenting and PCN on HD#3      She received 4 days worth of IV antibiotics and was transitioned and discharged home with cefpodoxime for 14d per ID  Pyelonephritis precautions were reviewed with [facility] and she was instructed to follow up in the office      On day of discharge she was ambulating, voiding spontaneously, tolerating oral intake and hemodynamically stable           Condition at discharge:   good      Disposition:   Home     Planned Readmission:   No     Discharge Medications:   Prenatal vitamin daily  Tylenol (over the counter) per bottle directions as needed for pain  Cefpodoxime for 14d     Discharge instructions :   -You may walk for exercise for the first 6 weeks then gradually return to your usual activities    -You may take baths or shower per your preference    -Please call your provider if temperature > 100 4*F or 38* C, worsening pain or a foul discharge                      Cosigned by:   Sosa Tilley DO at 8/15/2020 11:15 AM    Revision History

## 2020-08-17 NOTE — UTILIZATION REVIEW
Continued Stay Review    Date: 8/14/2020                        OBSERVATION ADMISSION 8/12/2020 1610 CONVERTED TO INPATIENT ADMISSION 8/14/2020 0946 DUE TO PYELONEPHRITIS WITH POSSIBLE RENAL CYST INFECTION; FAILED OP ANTIBIOTICS & REQ IV ANTIBX & IVH/ IV PAIN PUMP  IN 21W3D  PREGNANCY  08/14/20 0947    Inpatient Admission  Once      Transfer Service: OB/GYN       Question  Answer  Comment    Admitting Physician  Maria Guadalupe Cooper     Level of Care  Med Surg     Estimated length of stay  More than 2 Midnights     Certification  I certify that inpatient services are medically necessary for this patient for a duration of greater than two midnights  See H&P and MD Progress Notes for additional information about the patient's course of treatment  08/14/20 0946     Current Patient Class: Inpatient  Current Level of Care: med surg  HPI:30 y o  female initially admitted on 8/12/2020 at 21w1d weeks gestation  admitted for Observation for possible right sided pyelonephritis versus recurrent cystitis with concerns for right nephrolithiasis  Reports new onset back pain for the last 9 days  Moderate intensity located in the right upper back  She reports a headache  She was treated with a course of Macrobid for a symptomatic UTI on 8/3/20  Assessment/Plan:   8/13 Infectious disease: Patient 21w1d pregnant with right sided pyelonephritis:  outpt urine culture with E coli & did not respond to antibiotx; currently on IV Ceftriaxone which should be susciptible but pt not clinically improved; temps low grade with severe right flank pain  suspect due to obstruction of right kidney & possibly infected renal cyst  Modify antibiotic to IV Cefepime bc it is highly renally excreted, follow urien culture, monitor temps/wbcs/right flank pain  Moderately severe right sided hydronephrosis: no stone on MRI, possibly due to pregnant uterus which will worsen as pregnancy progresses  May be responsible for persistent flank pain   rec PCN, recommend IR eval for right sided PCN  Consider drainage of infected  renal cyst-if no flank pain imorovement  8/13 MFM quick note-   The imaging regarding the right maternal kidney is markedly abnormal, with a  markedly dilated right ureter and enlarged right kidney; some images demonstrate an apparent fluid level within the ureter, and the abnormal ureter is discontinuous with the separate renal cyst   Imaging findings and localization of her pain to the right flank likely support a renal source of her infection,  8/14/2020 Attending :  Right sided CVA tenderness: pain control with Dilaudid PCA, Cefepime 2 g IV BID, possible renal cyst infection on MRI, IR drainage today with stable WBC, urine culture >100K E coli, blood culture no growth  Childcare issues CYS aware; children with FOB, npo, IV NS  8/14/2020 Urology:  UTI with pyelonephritis; right sided hydronephrosis; best option is for conservative treatment with antibx and pain control; would cont to monitor in the hospital for another 24 HRS prior to DC and treat with appropriate antibx  Option of PCI nephrostomy vs nephroureteral stent would be present for the remainder of pregnancy with pt understanding     Pertinent Labs/Diagnostic Results:   Results from last 7 days   Lab Units 08/12/20  1946   SARS-COV-2  Negative     Results from last 7 days   Lab Units 08/15/20  0546 08/13/20  0612 08/12/20  1009   WBC Thousand/uL 9 49 10 47* 10 51*   HEMOGLOBIN g/dL 8 9* 10 1* 10 9*   HEMATOCRIT % 27 5* 30 6* 32 6*   PLATELETS Thousands/uL 212 204 208   NEUTROS ABS Thousands/µL 7 05 8 56* 9 38*         Results from last 7 days   Lab Units 08/15/20  0546 08/13/20  0612 08/12/20  1009   SODIUM mmol/L 139 138 133*   POTASSIUM mmol/L 2 8* 3 4* 3 0*   CHLORIDE mmol/L 107 107 100   CO2 mmol/L 20* 20* 21   ANION GAP mmol/L 12 11 12   BUN mg/dL 4* 3* 5   CREATININE mg/dL 0 48* 0 44* 0 67   EGFR ml/min/1 73sq m 134 138 120   CALCIUM mg/dL 8 0* 8 1* 8 5     Results from last 7 days   Lab Units 08/12/20  1009   AST U/L 18   ALT U/L 19   ALK PHOS U/L 94   TOTAL PROTEIN g/dL 6 7   ALBUMIN g/dL 2 3*   TOTAL BILIRUBIN mg/dL 0 42         Results from last 7 days   Lab Units 08/15/20  0546 08/13/20  0612 08/12/20  1009   GLUCOSE RANDOM mg/dL 92 96 94           Results from last 7 days   Lab Units 08/14/20  0034   PROTIME seconds 14 2   INR  1 12   PTT seconds 40*             Results from last 7 days   Lab Units 08/12/20  1915   LACTIC ACID mmol/L 1 0       Results from last 7 days   Lab Units 08/12/20  1039   CLARITY UA  Slightly Cloudy   COLOR UA  Yellow   SPEC GRAV UA  1 025   PH UA  6 0   GLUCOSE UA mg/dl Negative   KETONES UA mg/dl 40 (2+)*   BLOOD UA  Small*   PROTEIN UA mg/dl Trace*   NITRITE UA  Positive*   BILIRUBIN UA  Negative   UROBILINOGEN UA E U /dl 0 2   LEUKOCYTES UA  Negative   WBC UA /hpf 4-10*   RBC UA /hpf 4-10*   BACTERIA UA /hpf Innumerable*   EPITHELIAL CELLS WET PREP /hpf Moderate*       Results from last 7 days   Lab Units 08/12/20  1939 08/12/20  1918 08/12/20  1009   BLOOD CULTURE  No Growth After 4 Days  No Growth After 4 Days  --    URINE CULTURE   --   --  >100,000 cfu/ml Escherichia coli*               Vital Signs:   Date/Time   Temp   Pulse   Resp   BP   SpO2   O2 Device   Patient Position - Orthostatic VS    08/14/20 0700   98 8 °F (37 1 °C)   97   18   112/67   --   --       Medications:   Scheduled Medications:  cefepime, 2,000 mg, Intravenous, Q12H    Continuous IV Infusions:  HYDROmorphone, , Intravenous, Continuous  sodium chloride, 125 mL/hr, Intravenous, Continuous  sodium chloride, 125 mL/hr, Intravenous, Continuous    PRN Meds:     Discharge Plan: tbd  Network Utilization Review Department  [email or web address]  ATTENTION: Please call with any questions or concerns to [phone] and carefully listen to the prompts so that you are directed to the right person   All voicemails are confidential   [facility] all requests for admission clinical reviews, approved or denied determinations and any other requests to dedicated fax number below belonging to the campus where the patient is receiving treatment  List of dedicated fax numbers for the Facilities:  [address] DENIALS (Administrative/Medical Necessity) [phone]   [address] (Maternity/NICU/Pediatrics) [phone]   Jorge Alberto Cook [phone]   Marcelino Guerrero [phone]   [facility] [phone]   Shlomo Melgoza [phone]   [address] [facility] [address] [facility] [phone]   [facility]  [phone]   Branden Huff 1905 [address] [facility] [phone]     Initial Clinical Review    Admission: Date/Time/Statement:   Admission Orders (From admission, onward)     Ordered        08/14/20 0946  Inpatient Admission  Once         08/12/20 1610  Place in Observation  Once                   Orders Placed This Encounter   Procedures    Place in Observation     Standing Status:   Standing     Number of Occurrences:   1     Order Specific Question:   Admitting Physician     Answer:   Sotero Jeans [[id number]]     Order Specific Question:   Level of Care     Answer:   Med Surg [16]    Inpatient Admission     Standing Status:   Standing     Number of Occurrences:   1     Order Specific Question:   Admitting Physician     Answer:   Madelin Le [[id number]]     Order Specific Question:   Level of Care     Answer:   Med Surg [16]     Order Specific Question:   Estimated length of stay     Answer:   More than 2 Midnights     Order Specific Question:   Certification     Answer:   I certify that inpatient services are medically necessary for this patient for a duration of greater than two midnights   See H&P and MD Progress Notes for additional information about the patient's course of treatment  ED Arrival Information     Expected Arrival Acuity Means of Arrival Escorted By Service Admission Type    8/12/2020 8/12/2020 09:26 Urgent Walk-In Family Member OB/GYN Urgent    Arrival Complaint    back pain, fever        Chief Complaint   Patient presents with    Flank Pain     R flank pain, lower abdominal pain for 9 days, vomiting today  Assessment/Plan: 31 y o  [id number] female at 21w1d weeks gestation  admitted for Observation for possible right sided pyelonephritis versus recurrent cystitis with concerns for right nephrolithiasis  Reports new onset back pain for the last 9 days  Moderate intensity located in the right upper back  She reports a headache  She was treated with a course of Macrobid for a symptomatic UTI on 8/3/20  Cont IV antibx, intermittent FHR; & tocometry  Repeat GC/CT  IVF  Cervix:   deferred  Fetal heart rate:   Baseline Rate: 175 bpm  Variability: Moderate 6-25 bpm  Decelerations: None  [place]ch[place]:   Contraction Frequency (minutes): 0  8/12/2020 per provider- Pt with new temp=100 4F, noted to be wrapped in multiple blankets with aqua K at time of elevated temp but pt feels warm to touch endorses right CVA tenderness with tachycardia, FHT tachycardia  Feels probable Pyelonephritis, less likely chorioamnionitis  Cont IVF, blood cultures, covid tested, lactic acid & monitor closely  8/13 : per MFM: clinical picture appears most consistent with either pyelonephritis, appendicitis or complex UTI with stone  Recommend MRi imaging to eval appendix; if not available Ct of abd is reasonable  Advise 2g Ceftriaxone  8/13 PM update attending: Pt has returned from MRI, pain on right flank has acutely worsened now an 8/10 and pt is visibly in severe pain  On exam R CVA tenderness present, no rebound tenderness, some voluntary guarding of the abdomen   No new elevated temperatures, plan to continue fluids, ceftriaxone, on cursory MRI review R renal cyst present w/o gross evidence of disease otherwise   Will f/u final read from radiology  ED Triage Vitals [08/12/20 0914]   Temperature Pulse Respirations Blood Pressure SpO2   99 8 °F (37 7 °C) (!) 124 20 136/69 97 %      Temp Source Heart Rate Source Patient Position - Orthostatic VS BP Location FiO2 (%)   Oral Monitor Sitting Right arm --      Pain Score       Worst Possible Pain          Wt Readings from Last 1 Encounters:   08/12/20 70 8 kg (156 lb)     Additional Vital Signs:   Date/Time   Temp   Pulse   Resp   BP   SpO2   O2 Device   Patient Position - Orthostatic VS    08/13/20 0848   98 6 °F (37 °C)   --   --   --   --   --   --    08/13/20 0716   100 2 °F (37 9 °C)   104   20   105/56   --   --   Lying    08/13/20 0613   100 4 °F (38 °C)   100   20   106/58   99 %   --   Lying    08/12/20 2330   99 6 °F (37 6 °C)   --   --   --   --   --   --    08/12/20 2306   100 6 °F (38 1 °C)Abnormal     114Abnormal     18   89/53Abnormal     98 %   --   Lying    08/12/20 1752   100 1 °F (37 8 °C)   --   --   --   --   --   --    08/12/20 1730   100 4 °F (38 °C)   --   --   --   --   --   --    08/12/20 1532   --   120Abnormal     --   138/61   --   --   --    08/12/20 1510   98 6 °F (37 °C)   --   --   --   --   --   --    08/12/20 1000   --   --   --   106/57   --   --   --    08/12/20 0950   98 1 °F (36 7 °C)   93   20   106/57   --   --   Lying    08/12/20 0914   99 8 °F (37 7 °C)   124Abnormal     20   136/69   97 %   None (Room air)   Sitting       Weights (last 14 days)     Date/Time   Weight   Height    08/12/20 0950   70 8 kg (156 lb)   5' 1" (1 549 m)       Pertinent Labs/Diagnostic Test Results:   Results from last 7 days   Lab Units 08/12/20 1946   SARS-COV-2  Negative     Results from last 7 days   Lab Units 08/15/20  0546 08/13/20  0612 08/12/20  1009   WBC Thousand/uL 9 49 10 47* 10 51*   HEMOGLOBIN g/dL 8 9* 10 1* 10 9*   HEMATOCRIT % 27 5* 30 6* 32 6*   PLATELETS Thousands/uL 212 204 208   NEUTROS ABS Thousands/µL 7  05 8 56* 9 38*         Results from last 7 days   Lab Units 08/15/20  0546 08/13/20  0612 08/12/20  1009   SODIUM mmol/L 139 138 133*   POTASSIUM mmol/L 2 8* 3 4* 3 0*   CHLORIDE mmol/L 107 107 100   CO2 mmol/L 20* 20* 21   ANION GAP mmol/L 12 11 12   BUN mg/dL 4* 3* 5   CREATININE mg/dL 0 48* 0 44* 0 67   EGFR ml/min/1 73sq m 134 138 120   CALCIUM mg/dL 8 0* 8 1* 8 5     Results from last 7 days   Lab Units 08/12/20  1009   AST U/L 18   ALT U/L 19   ALK PHOS U/L 94   TOTAL PROTEIN g/dL 6 7   ALBUMIN g/dL 2 3*   TOTAL BILIRUBIN mg/dL 0 42         Results from last 7 days   Lab Units 08/15/20  0546 08/13/20  0612 08/12/20  1009   GLUCOSE RANDOM mg/dL 92 96 94           Results from last 7 days   Lab Units 08/12/20  1915   LACTIC ACID mmol/L 1 0       Results from last 7 days   Lab Units 08/12/20  1039   CLARITY UA  Slightly Cloudy   COLOR UA  Yellow   SPEC GRAV UA  1 025   PH UA  6 0   GLUCOSE UA mg/dl Negative   KETONES UA mg/dl 40 (2+)*   BLOOD UA  Small*   PROTEIN UA mg/dl Trace*   NITRITE UA  Positive*   BILIRUBIN UA  Negative   UROBILINOGEN UA E U /dl 0 2   LEUKOCYTES UA  Negative   WBC UA /hpf 4-10*   RBC UA /hpf 4-10*   BACTERIA UA /hpf Innumerable*   EPITHELIAL CELLS WET PREP /hpf Moderate*       Results from last 7 days   Lab Units 08/12/20  1939 08/12/20  1918 08/12/20  1009   BLOOD CULTURE  No Growth After 4 Days  No Growth After 4 Days  --    URINE CULTURE   --   --  >100,000 cfu/ml Escherichia coli*   8/12 RENAL ULTRASOUND  Impression:      1   Moderate right hydronephrosis     2   Evaluation for pyelonephritis is limited on ultrasound         ED Treatment:   Medication Administration from 08/12/2020 0902 to 08/13/2020 1012       Date/Time Order Dose Route Action     08/12/2020 1013 acetaminophen (TYLENOL) tablet 975 mg 975 mg Oral Given     08/12/2020 1348 potassium chloride (K-DUR,KLOR-CON) CR tablet 40 mEq 40 mEq Oral Given     08/12/2020 133 cefTRIAXone (ROCEPHIN) 1,000 mg in dextrose 5 % 50 mL IVPB 1,000 mg Intravenous New Bag     08/12/2020 1339 sodium chloride 0 9 % infusion 1,000 mL/hr Intravenous New Bag     08/13/2020 0418 sodium chloride 0 9 % infusion 125 mL/hr Intravenous New Bag     08/12/2020 1948 sodium chloride 0 9 % infusion 125 mL/hr Intravenous New Bag     08/12/2020 1750 sodium chloride 0 9 % infusion 999 mL/hr Intravenous Rate/Dose Change     08/12/2020 1531 sodium chloride 0 9 % infusion 125 mL/hr Intravenous New Bag     08/13/2020 0615 acetaminophen (TYLENOL) tablet 975 mg 975 mg Oral Given     08/12/2020 2305 acetaminophen (TYLENOL) tablet 975 mg 975 mg Oral Given     08/12/2020 1739 acetaminophen (TYLENOL) tablet 975 mg 975 mg Oral Given     08/13/2020 0932 cefTRIAXone (ROCEPHIN) 2,000 mg in dextrose 5 % 50 mL IVPB 2,000 mg Intravenous New Bag        Past Medical History:   Diagnosis Date    Chlamydia 2010       Admitting Diagnosis: Back pain [M54 9]  Age/Sex: 31 y o  female  Admission Orders:  Intermittent FH monitoring  Fetal NST  scd  Scheduled Medications:  cefTRIAXone, 2,000 mg, Intravenous, Q24H    Continuous IV Infusions:  sodium chloride, 125 mL/hr, Intravenous, Continuous    PRN Meds:       IP CONSULT TO CASE MANAGEMENT  IP CONSULT TO INFECTIOUS DISEASES  IP CONSULT TO UROLOGY    Network Utilization Review Department  [email or web address]  ATTENTION: Please call with any questions or concerns to [phone] and carefully listen to the prompts so that you are directed to the right person  All voicemails are confidential   [facility] all requests for admission clinical reviews, approved or denied determinations and any other requests to dedicated fax number below belonging to the campus where the patient is receiving treatment   List of dedicated fax numbers for the Facilities:  [address] DENIALS (Administrative/Medical Necessity) [phone]   [address] (Maternity/NICU/Pediatrics) [phone]   Gustabo Villalta [place] [phone]   [facility] [phone]   [place] Form [phone]   Alexandra Wood [address] [phone]   [facility]  [phone]   [address] [facility]  [address] [place] [address] [address] [facility] [phone]

## 2020-08-18 ENCOUNTER — TELEPHONE (OUTPATIENT)
Dept: OBGYN CLINIC | Facility: CLINIC | Age: 30
End: 2020-08-18

## 2020-08-18 LAB
BACTERIA BLD CULT: NORMAL
BACTERIA BLD CULT: NORMAL

## 2020-08-18 NOTE — TELEPHONE ENCOUNTER
Patient called and reports having some swelling, per CRNP patient should elevate feet/ drink water and not eat salt  Patient verbalized understanding and was encouraged to keep appointment on 8/19/2020 and explained how to contact OB provider after office hours

## 2020-08-19 ENCOUNTER — OFFICE VISIT (OUTPATIENT)
Dept: OBGYN CLINIC | Facility: CLINIC | Age: 30
End: 2020-08-19

## 2020-08-19 VITALS
DIASTOLIC BLOOD PRESSURE: 75 MMHG | HEART RATE: 88 BPM | SYSTOLIC BLOOD PRESSURE: 127 MMHG | WEIGHT: 157 LBS | BODY MASS INDEX: 29.66 KG/M2

## 2020-08-19 DIAGNOSIS — Z36.9 ENCOUNTER FOR FETAL ULTRASOUND: ICD-10-CM

## 2020-08-19 DIAGNOSIS — Z13.79 GENETIC SCREENING: ICD-10-CM

## 2020-08-19 DIAGNOSIS — O99.210 OBESITY IN PREGNANCY: ICD-10-CM

## 2020-08-19 DIAGNOSIS — Z3A.22 22 WEEKS GESTATION OF PREGNANCY: Primary | ICD-10-CM

## 2020-08-19 DIAGNOSIS — O34.219 PREVIOUS CESAREAN DELIVERY, ANTEPARTUM: ICD-10-CM

## 2020-08-19 DIAGNOSIS — Z3A.21 21 WEEKS GESTATION OF PREGNANCY: ICD-10-CM

## 2020-08-19 DIAGNOSIS — O09.899 HISTORY OF PRETERM DELIVERY, CURRENTLY PREGNANT: ICD-10-CM

## 2020-08-19 DIAGNOSIS — O09.299 PRIOR PREGNANCY COMPLICATED BY IUGR, ANTEPARTUM: ICD-10-CM

## 2020-08-19 DIAGNOSIS — O23.42 URINARY TRACT INFECTION IN MOTHER DURING SECOND TRIMESTER OF PREGNANCY: ICD-10-CM

## 2020-08-19 PROCEDURE — 99213 OFFICE O/P EST LOW 20 MIN: CPT | Performed by: OBSTETRICS & GYNECOLOGY

## 2020-08-19 PROCEDURE — 1111F DSCHRG MED/CURRENT MED MERGE: CPT | Performed by: OBSTETRICS & GYNECOLOGY

## 2020-08-19 PROCEDURE — 96372 THER/PROPH/DIAG INJ SC/IM: CPT | Performed by: OBSTETRICS & GYNECOLOGY

## 2020-08-19 PROCEDURE — 1036F TOBACCO NON-USER: CPT | Performed by: OBSTETRICS & GYNECOLOGY

## 2020-08-19 RX ORDER — FERROUS SULFATE TAB EC 324 MG (65 MG FE EQUIVALENT) 324 (65 FE) MG
324 TABLET DELAYED RESPONSE ORAL
Qty: 90 TABLET | Refills: 3 | Status: SHIPPED | OUTPATIENT
Start: 2020-08-19

## 2020-08-19 NOTE — ASSESSMENT & PLAN NOTE
- patient will have repeat  section  - patient desires bilateral salpingectomy for contraception, Joi Robinsons form will be signed at next month

## 2020-08-19 NOTE — PROGRESS NOTES
OB/GYN prenatal visit    S: 31 y o  [id number] 22w1d here for PN visit  She has no obstetric complaints, including pelvic pain, contractions, vaginal bleeding, loss of fluid, or decreased fetal movement  COVID19 Screen:    Cough: no  Fever: no  Shortness of breath: no  Known exposures to COVID-19, or international travel:no    O:  Vitals:    20 1418   BP: 127/75   Pulse: 88       Gen: no acute distress, nonlabored breathing       bpm    A/P:    Problem List Items Addressed This Visit        Genitourinary    UTI in pregnancy     - patient urine culture was positive for E coli and status post that she has been admitted with indication of p m  Nephritis and she get treated 40 soft cefepime IV and she has been discharged with cefpodoxime for 10 days which she is using now  - urine culture status post antibiotic completion  - patient decline urinary urgency, frequency, blood in urine              Other    22 weeks gestation of pregnancy (Chronic)     - continue prenatal vitamins  - hemoglobin 8 9 on discharge of hospital, I order iron p o  She mentioned she is not tolerating well which is causing constipation  I recommend use of Colace  If patient is not tolerating p o   Iron we will consider Venopfer infusion  - patient is complaining of swelling, I ordered protein creatinine ratio for baseline check, blood pressure 127/75 today   - CBC, CMP ordered for follow-up lab works after discharge, anemia and electrolytes           Relevant Orders    CBC and differential    Comprehensive metabolic panel    Protein / creatinine ratio, urine    Urine culture    Fetal ultrasound    Genetic screening    Previous c/s x3     - patient will have repeat  section  - patient desires bilateral salpingectomy for contraception, Yobani Outhouse form will be signed at next month         Prior IUGR    History of PTD @32 weeks     - she received mac unit today  - MFM office visit next week Monday, for anatomy scan and cervical length measurement  -  labor after  PPROM           Obesity in pregnancy    Currently pregnant - Primary    Relevant Medications    ferrous sulfate 324 (65 Fe) mg    Other Relevant Orders    CBC and differential    Comprehensive metabolic panel    Protein / creatinine ratio, urine    Urine culture            24-28 weeks: 28 week labs (CBC, RPR, 1hr GTT), Rh status/rhogam, FKC    Ann Marie Gaston MD  3/00/3086  4:99 PM

## 2020-08-19 NOTE — ASSESSMENT & PLAN NOTE
- she received mac unit today  - [facility] office visit next week Monday, for anatomy scan and cervical length measurement  -  labor after  PPROM

## 2020-08-19 NOTE — ASSESSMENT & PLAN NOTE
- continue prenatal vitamins  - hemoglobin 8 9 on discharge of hospital, I order iron p o  She mentioned she is not tolerating well which is causing constipation  I recommend use of Colace  If patient is not tolerating p o   Iron we will consider Venopfer infusion  - patient is complaining of swelling, I ordered protein creatinine ratio for baseline check, blood pressure 127/75 today   - CBC, CMP ordered for follow-up lab works after discharge, anemia and electrolytes

## 2020-08-19 NOTE — ASSESSMENT & PLAN NOTE
- patient urine culture was positive for E coli and status post that she has been admitted with indication of p m  Nephritis and she get treated 40 soft cefepime IV and she has been discharged with cefpodoxime for 10 days which she is using now    - urine culture status post antibiotic completion  - patient decline urinary urgency, frequency, blood in urine

## 2020-08-21 ENCOUNTER — TELEPHONE (OUTPATIENT)
Dept: PERINATAL CARE | Facility: CLINIC | Age: 30
End: 2020-08-21

## 2020-08-21 ENCOUNTER — APPOINTMENT (OUTPATIENT)
Dept: LAB | Facility: CLINIC | Age: 30
End: 2020-08-21
Payer: COMMERCIAL

## 2020-08-21 DIAGNOSIS — Z3A.22 22 WEEKS GESTATION OF PREGNANCY: ICD-10-CM

## 2020-08-21 LAB
ALBUMIN SERPL BCP-MCNC: 2.4 G/DL (ref 3.5–5)
ALP SERPL-CCNC: 101 U/L (ref 46–116)
ALT SERPL W P-5'-P-CCNC: 17 U/L (ref 12–78)
ANION GAP SERPL CALCULATED.3IONS-SCNC: 6 MMOL/L (ref 4–13)
AST SERPL W P-5'-P-CCNC: 14 U/L (ref 5–45)
BASOPHILS # BLD AUTO: 0.01 THOUSANDS/ΜL (ref 0–0.1)
BASOPHILS NFR BLD AUTO: 0 % (ref 0–1)
BILIRUB SERPL-MCNC: 0.16 MG/DL (ref 0.2–1)
BUN SERPL-MCNC: 5 MG/DL (ref 5–25)
CALCIUM SERPL-MCNC: 8.6 MG/DL (ref 8.3–10.1)
CHLORIDE SERPL-SCNC: 110 MMOL/L (ref 100–108)
CO2 SERPL-SCNC: 25 MMOL/L (ref 21–32)
CREAT SERPL-MCNC: 0.49 MG/DL (ref 0.6–1.3)
CREAT UR-MCNC: 46.5 MG/DL
EOSINOPHIL # BLD AUTO: 0.02 THOUSAND/ΜL (ref 0–0.61)
EOSINOPHIL NFR BLD AUTO: 0 % (ref 0–6)
ERYTHROCYTE [DISTWIDTH] IN BLOOD BY AUTOMATED COUNT: 13.6 % (ref 11.6–15.1)
GFR SERPL CREATININE-BSD FRML MDRD: 133 ML/MIN/1.73SQ M
GLUCOSE SERPL-MCNC: 84 MG/DL (ref 65–140)
HCT VFR BLD AUTO: 31.7 % (ref 34.8–46.1)
HGB BLD-MCNC: 10.1 G/DL (ref 11.5–15.4)
IMM GRANULOCYTES # BLD AUTO: 0.04 THOUSAND/UL (ref 0–0.2)
IMM GRANULOCYTES NFR BLD AUTO: 0 % (ref 0–2)
LYMPHOCYTES # BLD AUTO: 1.79 THOUSANDS/ΜL (ref 0.6–4.47)
LYMPHOCYTES NFR BLD AUTO: 18 % (ref 14–44)
MCH RBC QN AUTO: 28.9 PG (ref 26.8–34.3)
MCHC RBC AUTO-ENTMCNC: 31.9 G/DL (ref 31.4–37.4)
MCV RBC AUTO: 91 FL (ref 82–98)
MONOCYTES # BLD AUTO: 0.63 THOUSAND/ΜL (ref 0.17–1.22)
MONOCYTES NFR BLD AUTO: 6 % (ref 4–12)
NEUTROPHILS # BLD AUTO: 7.47 THOUSANDS/ΜL (ref 1.85–7.62)
NEUTS SEG NFR BLD AUTO: 76 % (ref 43–75)
NRBC BLD AUTO-RTO: 0 /100 WBCS
PLATELET # BLD AUTO: 308 THOUSANDS/UL (ref 149–390)
PMV BLD AUTO: 10.4 FL (ref 8.9–12.7)
POTASSIUM SERPL-SCNC: 3.6 MMOL/L (ref 3.5–5.3)
PROT SERPL-MCNC: 7 G/DL (ref 6.4–8.2)
PROT UR-MCNC: 18 MG/DL
PROT/CREAT UR: 0.39 MG/G{CREAT} (ref 0–0.1)
RBC # BLD AUTO: 3.5 MILLION/UL (ref 3.81–5.12)
SODIUM SERPL-SCNC: 141 MMOL/L (ref 136–145)
WBC # BLD AUTO: 9.96 THOUSAND/UL (ref 4.31–10.16)

## 2020-08-21 PROCEDURE — 85025 COMPLETE CBC W/AUTO DIFF WBC: CPT

## 2020-08-21 PROCEDURE — 36415 COLL VENOUS BLD VENIPUNCTURE: CPT

## 2020-08-21 PROCEDURE — 82570 ASSAY OF URINE CREATININE: CPT

## 2020-08-21 PROCEDURE — 84156 ASSAY OF PROTEIN URINE: CPT

## 2020-08-21 PROCEDURE — 87086 URINE CULTURE/COLONY COUNT: CPT

## 2020-08-21 PROCEDURE — 80053 COMPREHEN METABOLIC PANEL: CPT

## 2020-08-21 NOTE — TELEPHONE ENCOUNTER
Spoke with patient and confirmed appointment with MFM  1 support person ( must be over age of 15) may accompany patient  Will you and your support person be able to wear a mask ,without a valve , during entire appointment? YES   To minimize your exposure in our waiting area,check in and rooming questions will be done via phone  When you arrive in the parking lot please call the following inside line # prior to entering office:    Abeba Santana [phone] line: 280 [facility] line:  [phone] line: [phone]  Marco Hurd line:  [phone]  [place] line: [phone]    Have you or your support person traveled outside the state in the last 2 weeks? NO   If yes, what state did you travel to? NO     Do you or your support person have:  Fever or flu- like symptoms? NO  Symptoms of upper respiratory infection like runny nose, sore throat or cough? NO  Do you have new headache that you have not had in the past?NO  Have you experienced any new shortness of breath recently? NO  Do you have any new loss of taste or smell? NO  Do you have any new diarrhea, nausea or vomiting? NO  Have you recently been in contact with anyone who has been sick or diagnosed with COVID-19 infection? NO  Have you been recommended to quarantine because of an exposure to a confirmed positive COVID19 person? NO  You and your support person will have temperature screening upon arrival   Patient verbalized understanding of all instructions  IF you are not feeling well- cough, fever, shortness of breath or any flu like symptoms, contact your primary care physician or [phone]    Any questions with these instructions please call Maternal Fetal Medicine nurse line today @ # [phone]

## 2020-08-23 LAB — BACTERIA UR CULT: NORMAL

## 2020-08-24 ENCOUNTER — TELEPHONE (OUTPATIENT)
Dept: PERINATAL CARE | Facility: OTHER | Age: 30
End: 2020-08-24

## 2020-08-24 NOTE — TELEPHONE ENCOUNTER
Patient called to cancel appointment  Said she had no   I offered other appointments but she could not do those dates  Patient is having difficulties due to no sitter for her child

## 2020-08-26 ENCOUNTER — PATIENT OUTREACH (OUTPATIENT)
Dept: OBGYN CLINIC | Facility: CLINIC | Age: 30
End: 2020-08-26

## 2020-08-26 ENCOUNTER — TELEPHONE (OUTPATIENT)
Dept: OBGYN CLINIC | Facility: CLINIC | Age: 30
End: 2020-08-26

## 2020-08-26 DIAGNOSIS — Z34.92 PRENATAL CARE IN SECOND TRIMESTER: Primary | ICD-10-CM

## 2020-08-26 NOTE — TELEPHONE ENCOUNTER
Patient called and reports having right flank pain with no relief and that she has a Urology consults appointment on 9/8/2020  Advised patient to call Urology for earlier appointment and if pain continues, she is to go to [place] [id number] for evaluation  Patient verbalized understanding

## 2020-08-26 NOTE — TELEPHONE ENCOUNTER
Patient called back to say she is still taking medications that were given in emergency room but she is still having pain  Level 6 on her right side

## 2020-08-26 NOTE — TELEPHONE ENCOUNTER
Patient is scheduled for follow-up appointment in the office 09/08/2020    No other medications or interventions can be provided until that evaluation is performed

## 2020-08-27 ENCOUNTER — ROUTINE PRENATAL (OUTPATIENT)
Dept: OBGYN CLINIC | Facility: CLINIC | Age: 30
End: 2020-08-27

## 2020-08-27 ENCOUNTER — PATIENT OUTREACH (OUTPATIENT)
Dept: OBGYN CLINIC | Facility: CLINIC | Age: 30
End: 2020-08-27

## 2020-08-27 VITALS
HEART RATE: 88 BPM | BODY MASS INDEX: 29.36 KG/M2 | SYSTOLIC BLOOD PRESSURE: 116 MMHG | DIASTOLIC BLOOD PRESSURE: 74 MMHG | TEMPERATURE: 98 F | WEIGHT: 155.4 LBS

## 2020-08-27 DIAGNOSIS — O23.42 URINARY TRACT INFECTION IN MOTHER DURING SECOND TRIMESTER OF PREGNANCY: Primary | ICD-10-CM

## 2020-08-27 DIAGNOSIS — O34.219 PREVIOUS CESAREAN DELIVERY, ANTEPARTUM: ICD-10-CM

## 2020-08-27 DIAGNOSIS — O09.899 HISTORY OF PRETERM DELIVERY, CURRENTLY PREGNANT: ICD-10-CM

## 2020-08-27 DIAGNOSIS — Z30.8 ENCOUNTER FOR OTHER CONTRACEPTIVE MANAGEMENT: ICD-10-CM

## 2020-08-27 DIAGNOSIS — D50.9 IRON DEFICIENCY ANEMIA, UNSPECIFIED IRON DEFICIENCY ANEMIA TYPE: ICD-10-CM

## 2020-08-27 DIAGNOSIS — Z3A.23 23 WEEKS GESTATION OF PREGNANCY: ICD-10-CM

## 2020-08-27 PROCEDURE — PNV: Performed by: OBSTETRICS & GYNECOLOGY

## 2020-08-27 PROCEDURE — 96372 THER/PROPH/DIAG INJ SC/IM: CPT | Performed by: OBSTETRICS & GYNECOLOGY

## 2020-08-27 PROCEDURE — 1111F DSCHRG MED/CURRENT MED MERGE: CPT | Performed by: OBSTETRICS & GYNECOLOGY

## 2020-08-27 NOTE — ASSESSMENT & PLAN NOTE
- s/p hospitalization 8/12-8/15 due to presumed pyelonephritis  She was treated with IV antibiotics (ceftriaxone then changed to cefepime per ID) for 4 days and discharged home with Cefpodoxime 400mg BID for a total of 14 days  She endorses still taking it twice daily    - Urine culture from 8/21 negative (mixed contaminates)  Will repeat urine culture with 28 week labs (ordered)  - She has a follow up appointment with urology on 9/8/2020  - Urine P:C ratio was elevated at 0 39 on 8/21  BP wnl  No other signs or symptoms of preeclampsia  Will continue to monitor and repeat P:C ratio with 28 week labs (ordered)

## 2020-08-27 NOTE — PROGRESS NOTES
[place]   Start at 16 weeks up until 36wks 6 days  o 275 mg subcutaneous injection, weekly   Gestational age: 21 weeks  2 days     Side effects: no   Given by: Viri RIZVI    Site: Right Arm

## 2020-08-27 NOTE — PROGRESS NOTES
SW spoke with Pt to address day care issues  Pt denies any pain today  was upset stating that she had a visit from [facility] yesterday and she was not notified of the referral  Pt states she is a great mother and her only problem is that she hs no support in th area  FOB is in [place] and tried to help as needed  Pt has a child with special needs and a 8 and 3 y/o that go with her to all appointment  SW apologist for the miscommunication during inpatient admission and ensure her the referral was to access assistance with day care  SERENA asked about [facility] for disabled child and Pt states she need to call them to return since they stop the services due to COVID-19  SW encouraged to do so to get some help  Pt aware of importance to reschedule [address] appointment  States is working on getting baby sitting help to reschedule it  Pt states her and FOB are trying to work things out but he is currently in [place]  "I am used to do things on my own with my kids" she stated  SW encouraged to work with C&[facility] if they can help and woks as much as can with her providers to received the pre ab care she deserve  Pt verbalized understanding  Pt denies other concern at this time  Pt will contact SW as needed

## 2020-08-27 NOTE — PROGRESS NOTES
OB/GYN  PN Visit  Tari Ibrahim  [id number]  2020  12:21 PM  Dr Celeste Read MD    S: 31 y o  [id number] 23w2d here for PN visit  She denies contractions  She denies leakage of fluid and vaginal bleeding  She reports good fetal movement  Her pregnancy is complicated by history of  delivery, history of 3 prior  sections, pyelonephritis during this pregnancy, and anemia  She called yesterday with complaints of flank pain  She states that she took 975mg of Tylenol and her pain improved and is much better today  O:  Vitals:    20 1124   BP: 116/74   Pulse: 88   Temp: 98 °F (36 7 °C)     Physical Exam  Vitals signs reviewed  Constitutional:       General: She is not in acute distress  Appearance: She is well-developed  She is not diaphoretic  Cardiovascular:      Rate and Rhythm: Normal rate  Pulmonary:      Effort: Pulmonary effort is normal    Abdominal:      Palpations: Abdomen is soft  Genitourinary:     Comments: Gravid, nontender  FH: 23cm  FHT: 152bpm  Fetal movement appreciated during exam  Skin:     General: Skin is warm and dry  Neurological:      Mental Status: She is alert and oriented to person, place, and time  Psychiatric:         Behavior: Behavior normal            A/P:  Problem List Items Addressed This Visit        Unprioritized    Contraception management     - Patient desires surgical sterilization  Discussed with pt LARC methods of contraception including intrauterine device, Nexplanon, and Depo Provera in addition to surgical sterilization  Discussed the risks, benefits, and alternatives to each  Discussed that surgical sterilization is a PERMANENT and IRREVERSIBLE surgical procedure; pt demonstrated understanding and continued desire to proceed  Patient is okay if she does not have any more children, even if she dates a different partner or loses her children    - MA 31 form signed 2020          Previous c/s x3     - Plan for RLTCS with tubal sterilization  - Patient requests Friday appointment due to  issues  Will discuss closer to her due date  History of PTD @32 weeks     - Continue Marybeth  - No signs or symptoms of  labor at this time  - Reviewed precautions  - CL was 3 5cm on 2020         UTI in pregnancy - Primary     - s/p hospitalization -8/15 due to presumed pyelonephritis  She was treated with IV antibiotics (ceftriaxone then changed to cefepime per ID) for 4 days and discharged home with Cefpodoxime 400mg BID for a total of 14 days  She endorses still taking it twice daily    - Urine culture from  negative (mixed contaminates)  Will repeat urine culture with 28 week labs (ordered)  - She has a follow up appointment with urology on 2020  - Urine P:C ratio was elevated at 0 39 on   BP wnl  No other signs or symptoms of preeclampsia  Will continue to monitor and repeat P:C ratio with 28 week labs (ordered)  Relevant Orders    CBC and Platelet    Comprehensive metabolic panel    Protein / creatinine ratio, urine    Urine culture    23 weeks gestation of pregnancy     - Continue prenatal vitamin  - Patient will attempt to reschedule her appointment with the  center (previously canceled due to  issues)  - Delivery: RLTCS  - Contraception: Tubal   - 28 week labs ordered and will be completed just prior to next appointment in 4 weeks  Relevant Orders    Glucose, 1H PG    Anemia     - Continue iron and colace  - Hgb on 8/15 was 8 9; Most recently 10 1 on , improved     - Will recheck CBC with 28 week labs (ordered)                  Future Appointments   Date Time Provider Mario Alberto Lee   2020  1:45 PM Renetta Zuluaga MD URO [address] [place] [address] [facility]   9/10/2020 10:30 AM THAI [facility]   2020 11:15 AM [address] Jeff Villalobos MD  2020  12:21 PM

## 2020-08-27 NOTE — ASSESSMENT & PLAN NOTE
- Continue prenatal vitamin  - Patient will attempt to reschedule her appointment with the  center (previously canceled due to  issues)  - Delivery: RLTCS  - Contraception: Tubal   - 28 week labs ordered and will be completed just prior to next appointment in 4 weeks

## 2020-08-27 NOTE — ASSESSMENT & PLAN NOTE
- Plan for RLTCS with tubal sterilization  - Patient requests Friday appointment due to  issues  Will discuss closer to her due date

## 2020-08-27 NOTE — ASSESSMENT & PLAN NOTE
- Continue iron and colace  - Hgb on 8/15 was 8 9; Most recently 10 1 on 8/21, improved     - Will recheck CBC with 28 week labs (ordered)

## 2020-08-27 NOTE — ASSESSMENT & PLAN NOTE
- Patient desires surgical sterilization  Discussed with pt LARC methods of contraception including intrauterine device, Nexplanon, and Depo Provera in addition to surgical sterilization  Discussed the risks, benefits, and alternatives to each  Discussed that surgical sterilization is a PERMANENT and IRREVERSIBLE surgical procedure; pt demonstrated understanding and continued desire to proceed  Patient is okay if she does not have any more children, even if she dates a different partner or loses her children    - MA 31 form signed 8/27/2020

## 2020-08-27 NOTE — ASSESSMENT & PLAN NOTE
- Continue Marybeth  - No signs or symptoms of  labor at this time  - Reviewed precautions  - CL was 3 5cm on 2020

## 2020-08-27 NOTE — PATIENT INSTRUCTIONS
Pregnancy at 23 to 26 [address] [place] [address] Avenue:   You are now close to or at the beginning of the third trimester  The third trimester starts at 24 weeks and ends with delivery  As your baby gets larger, you may develop certain symptoms  These may include pain in your back or down the sides of your abdomen  You may also have stretch marks on your abdomen, breasts, thighs, or buttocks  You may also have constipation  DISCHARGE INSTRUCTIONS:   Seek care immediately if:   · You develop a severe headache that does not go away  · You have new or increased vision changes, such as blurred or spotted vision  · You have new or increased swelling in your face or hands  · You have vaginal spotting or bleeding  · Your water broke or you feel warm water gushing or trickling from your vagina  Contact your healthcare provider if:   · You have abdominal cramps, pressure, or tightening  · You have a change in vaginal discharge  · You have light bleeding  · You have chills or a fever  · You have vaginal itching, burning, or pain  · You have yellow, green, white, or foul-smelling vaginal discharge  · You have pain or burning when you urinate, less urine than usual, or pink or bloody urine  · You have questions or concerns about your condition or care  How to care for yourself at this stage of your pregnancy:   · Eat a variety of healthy foods  Healthy foods include fruits, vegetables, whole-grain breads, low-fat dairy foods, beans, lean meats, and fish  Drink liquids as directed  Ask how much liquid to drink each day and which liquids are best for you  Limit caffeine to less than 200 milligrams each day  Limit your intake of fish to 2 servings each week  Choose fish low in mercury such as canned light tuna, shrimp, salmon, cod, or tilapia  Do not  eat fish high in mercury such as swordfish, tilefish, elva mackerel, and shark  · Manage back pain    Do not stand for long periods of time or lift heavy items  Use good posture while you stand, squat, or bend  Wear low-heeled shoes with good support  Rest may also help to relieve back pain  Ask your healthcare provider about exercises you can do to strengthen your back muscles  · Take prenatal vitamins as directed  Your need for certain vitamins and minerals, such as folic acid, increases during pregnancy  Prenatal vitamins provide some of the extra vitamins and minerals you need  Prenatal vitamins may also help to decrease the risk of certain birth defects  · Talk to your healthcare provider about exercise  Moderate exercise can help you stay fit  Your healthcare provider will help you plan an exercise program that is safe for you during pregnancy  · Do not smoke  If you smoke, it is never too late to quit  Smoking increases your risk of a miscarriage and other health problems during your pregnancy  Smoking can cause your baby to be born too early or weigh less at birth  Ask your healthcare provider for information if you need help quitting  · Do not drink alcohol  Alcohol passes from your body to your baby through the placenta  It can affect your baby's brain development and cause fetal alcohol syndrome (FAS)  FAS is a group of conditions that causes mental, behavior, and growth problems  · Talk to your healthcare provider before you take any medicines  Many medicines may harm your baby if you take them when you are pregnant  Do not take any medicines, vitamins, herbs, or supplements without first talking to your healthcare provider  Never use illegal or street drugs (such as marijuana or cocaine) while you are pregnant  Safety tips:   · Avoid hot tubs and saunas  Do not use a hot tub or sauna while you are pregnant, especially during your first trimester  Hot tubs and saunas may raise your baby's temperature and increase the risk of birth defects  · Avoid toxoplasmosis    This is an infection caused by eating raw meat or being around infected cat feces  It can cause birth defects, miscarriages, and other problems  Wash your hands after you touch raw meat  Make sure any meat is well-cooked before you eat it  Avoid raw eggs and unpasteurized milk  Use gloves or ask someone else to clean your cat's litter box while you are pregnant  Changes that are happening with your baby:  By 26 weeks, your baby will weigh about 2 pounds  Your baby will be about 10 inches long from the top of the head to the rump (baby's bottom)  Your baby's movements are much stronger now  Your baby's eyes are almost completely formed and can partially open  Your baby also sleeps and wakes up  What you need to know about prenatal care: Your healthcare provider will check your blood pressure and weight  You may also need the following:  · A urine test  may also be done to check for sugar and protein  These can be signs of gestational diabetes or infection  Protein in your urine may also be a sign of preeclampsia  Preeclampsia is a condition that can develop during week 20 or later of your pregnancy  It causes high blood pressure, and it can cause problems with your kidneys and other organs  · Fundal height  is a measurement of your uterus to check your baby's growth  This number is usually the same as the number of weeks that you have been pregnant  · Your baby's heart rate  will be checked  © 2017 [address] Information is for End User's use only and may not be sold, redistributed or otherwise used for commercial purposes  All illustrations and images included in CareNotes® are the copyrighted property of A D A M , Inc  or Patricio Reynolds  The above information is an  only  It is not intended as medical advice for individual conditions or treatments  Talk to your doctor, nurse or pharmacist before following any medical regimen to see if it is safe and effective for you

## 2020-08-31 NOTE — TELEPHONE ENCOUNTER
Message   Received: Today   Message Contents   John [place], [address] [address] For Urology [facility] [place]            Previous Messages        Letter        Shannon Sheikh ([id number]): was seen in [facility] and was referred to you for follow up by hSivani Conley (Primary Specialty: Obstetrics and GynecologyObstetricsGynecology)

## 2020-09-03 ENCOUNTER — CLINICAL SUPPORT (OUTPATIENT)
Dept: OBGYN CLINIC | Facility: CLINIC | Age: 30
End: 2020-09-03

## 2020-09-03 VITALS
WEIGHT: 160.8 LBS | HEART RATE: 59 BPM | DIASTOLIC BLOOD PRESSURE: 63 MMHG | BODY MASS INDEX: 30.38 KG/M2 | SYSTOLIC BLOOD PRESSURE: 109 MMHG | TEMPERATURE: 97.8 F

## 2020-09-03 DIAGNOSIS — O09.899 HISTORY OF PRETERM DELIVERY, CURRENTLY PREGNANT: ICD-10-CM

## 2020-09-03 PROCEDURE — 99211 OFF/OP EST MAY X REQ PHY/QHP: CPT

## 2020-09-03 PROCEDURE — 96372 THER/PROPH/DIAG INJ SC/IM: CPT | Performed by: OBSTETRICS & GYNECOLOGY

## 2020-09-03 PROCEDURE — 96372 THER/PROPH/DIAG INJ SC/IM: CPT

## 2020-09-03 NOTE — PROGRESS NOTES
[place]   Start at 16 weeks up until 36wks 6 days   o 275 mg subcutaneous injection, weekly   Gestational age: 24 weeks 2 days     Side effects: no   Given by: Viri RIZVI   Site:Right arm

## 2020-09-08 ENCOUNTER — TELEPHONE (OUTPATIENT)
Dept: PERINATAL CARE | Facility: OTHER | Age: 30
End: 2020-09-08

## 2020-09-08 ENCOUNTER — OFFICE VISIT (OUTPATIENT)
Dept: UROLOGY | Age: 30
End: 2020-09-08
Payer: COMMERCIAL

## 2020-09-08 VITALS
WEIGHT: 160 LBS | BODY MASS INDEX: 30.21 KG/M2 | HEIGHT: 61 IN | SYSTOLIC BLOOD PRESSURE: 108 MMHG | TEMPERATURE: 97.9 F | DIASTOLIC BLOOD PRESSURE: 60 MMHG

## 2020-09-08 DIAGNOSIS — O23.42 URINARY TRACT INFECTION IN MOTHER DURING SECOND TRIMESTER OF PREGNANCY: ICD-10-CM

## 2020-09-08 DIAGNOSIS — N13.30 HYDRONEPHROSIS, UNSPECIFIED HYDRONEPHROSIS TYPE: ICD-10-CM

## 2020-09-08 DIAGNOSIS — Z3A.25 25 WEEKS GESTATION OF PREGNANCY: Primary | ICD-10-CM

## 2020-09-08 PROCEDURE — 99214 OFFICE O/P EST MOD 30 MIN: CPT | Performed by: UROLOGY

## 2020-09-08 NOTE — ASSESSMENT & PLAN NOTE
Patient is doing well at this time  She has no symptoms of infection  She will be repeating a urine culture within the next 48 hours in preparation for her next OB GYN visit

## 2020-09-08 NOTE — PATIENT INSTRUCTIONS
Hidronefrosis   LO QUE NECESITA SABER:   La hidronefrosis es inflamación en chastity o ambos riñones causada por la acumulación de [place]  La orina generalmente fluye de los riñones a la vejiga por medio de unos tubos llamados uréteres  Mateo obstrucción en los uréteres pueden evitar que la orina fluya apropiadamente  El flujo de la orina podría también ser interrumpido o retrasado cuando los riñones no funcionan correctamente  La orina vuelve a ramon tracto urinario  Se acumula presión en el riñón, lo cual provoca hinchazón  INSTRUCCIONES SOBRE EL TILA HOSPITALARIA:   Marcos mateo sagrario de control con ramon médico o especialista erika se lo indicaron:  Es probable que lo refieran a un ginecólogo, oncólogo o urólogo para más pruebas y tratamiento  Anote thelma preguntas para que se acuerde de hacerlas kalyan thelma visitas  Pregúntele a ramon [place] vitaminas y minerales son adecuados para usted  · Ramon abdomen se siente lleno  · Usted nota un cambio en la cantidad o frecuencia de la orina  · Usted orina más veces por la noche y en cantidades mayores que kalyan el día  · Usted siente dolor leve en la parte inferior de la espalda o dolor en un lado del cuerpo cuando orina  · Usted tiene preguntas o inquietudes acerca de ramon condición o cuidado  Regrese a la nasreen de emergencias si:   · Usted tiene dolor de espalda intenso y penetrante  · Usted orina con sean  · Usted no puede orinar anton [place]   © 2017 [address] Information is for End User's use only and may not be sold, redistributed or otherwise used for commercial purposes  All illustrations and images included in CareNotes® are the copyrighted property of A D A M , Inc  or Patricio Reynolds  Esta información es sólo para uso en educación  Ramon intención no es darle un consejo médico sobre enfermedades o tratamientos   Colsulte con ramon Ailyn Mall farmacéutico antes de seguir cualquier régimen médico para saber si es seguro y efectivo para usted

## 2020-09-08 NOTE — PROGRESS NOTES
Assessment/Plan:    Hydronephrosis  The patient is MRI was reviewed  In all likelihood she has right hydronephrosis of pregnancy  The patient is presently doing well  She is asymptomatic and voiding well  Renal function is normal   Options were discussed with her  We will plan to follow her with observation  She will call or seek medical attention for fever, severe pain etc     UTI in pregnancy  Patient is doing well at this time  She has no symptoms of infection  She will be repeating a urine culture within the next 48 hours in preparation for her next OB GYN visit  Diagnoses and all orders for this visit:    25 weeks gestation of pregnancy    Hydronephrosis, unspecified hydronephrosis type  -     Ambulatory referral to Urology    Urinary tract infection in mother during second trimester of pregnancy          Subjective:      Patient ID: Soraida Bey is a 31 y o  female  Chief complaint:  Hydronephrosis of pregnancy    HPI:  53-year-old female who is now 25 weeks pregnant  She was recently hospitalized with right flank pain and found to have right hydronephrosis  Pyelonephritis was suspected and she was begun on antibiotic therapy  Nephrostomy tube placement verses stent placement were considered  She did well in the hospital-no procedure was required for the hydronephrosis,  and she was discharged for outpatient management  She returns today for follow-up  She notes that she is voiding well  She has no dysuria or gross hematuria  She denies nausea or vomiting  There are no symptoms of infection  She did have some flank pain over the weekend that resolved with Tylenol and she has been pain-free since  She feels well and notes that she is doing well overall        The following portions of the patient's history were reviewed and updated as appropriate: allergies, current medications, past family history, past medical history, past social history, past surgical history and problem list     Review of Systems   Constitutional: Negative for activity change, appetite change, fatigue and fever  HENT: Negative  Eyes: Negative  Respiratory: Negative  Cardiovascular: Negative  Gastrointestinal: Negative for blood in stool, constipation, diarrhea and vomiting  Endocrine: Negative  Genitourinary:        See HPI   Musculoskeletal: Negative  Skin: Negative  Allergic/Immunologic: Negative  Neurological: Negative  Hematological: Negative  Psychiatric/Behavioral: Negative  Objective:      /60 (BP Location: Left arm, Patient Position: Sitting, Cuff Size: Adult)   Temp 97 9 °F (36 6 °C)   Ht 5' 1" (1 549 m)   Wt 72 6 kg (160 lb)   LMP 03/17/2020 (Exact Date)   BMI 30 23 kg/m²          Physical Exam  Vitals signs reviewed  Exam conducted with a chaperone present  Constitutional:       General: She is not in acute distress  Appearance: Normal appearance  She is well-developed  She is not ill-appearing, toxic-appearing or diaphoretic  HENT:      Head: Normocephalic and atraumatic  Eyes:      Conjunctiva/sclera: Conjunctivae normal    Neck:      Musculoskeletal: Neck supple  Cardiovascular:      Rate and Rhythm: Normal rate  Pulmonary:      Effort: Pulmonary effort is normal    Abdominal:      General: There is no distension  Palpations: Abdomen is soft  There is no mass  Tenderness: There is no abdominal tenderness  There is no right CVA tenderness, left CVA tenderness, guarding or rebound  Hernia: No hernia is present  Comments: Gravid     Skin:     General: Skin is warm and dry  Neurological:      Mental Status: She is alert and oriented to person, place, and time  Psychiatric:         Behavior: Behavior normal          Thought Content:  Thought content normal          Judgment: Judgment normal

## 2020-09-08 NOTE — TELEPHONE ENCOUNTER
Called to prescreen for tomorrow's appointment  Patient states no one called her to let her know of this rescheduled appointment  Patient can not come in at 8 am because she has no one to stay with her children  She is now requesting another appointment the latest in the afternoon if possible and can only go to [facility]  Her demographics were sent to the  to try to accommodate her

## 2020-09-08 NOTE — ASSESSMENT & PLAN NOTE
The patient is MRI was reviewed  In all likelihood she has right hydronephrosis of pregnancy  The patient is presently doing well  She is asymptomatic and voiding well  Renal function is normal   Options were discussed with her  We will plan to follow her with observation    She will call or seek medical attention for fever, severe pain etc

## 2020-09-09 ENCOUNTER — TELEPHONE (OUTPATIENT)
Dept: OBGYN CLINIC | Facility: CLINIC | Age: 30
End: 2020-09-09

## 2020-09-09 ENCOUNTER — APPOINTMENT (OUTPATIENT)
Dept: LAB | Facility: CLINIC | Age: 30
End: 2020-09-09
Payer: COMMERCIAL

## 2020-09-09 DIAGNOSIS — O23.42 URINARY TRACT INFECTION IN MOTHER DURING SECOND TRIMESTER OF PREGNANCY: ICD-10-CM

## 2020-09-09 DIAGNOSIS — Z3A.23 23 WEEKS GESTATION OF PREGNANCY: ICD-10-CM

## 2020-09-09 LAB
ALBUMIN SERPL BCP-MCNC: 2.7 G/DL (ref 3.5–5)
ALP SERPL-CCNC: 80 U/L (ref 46–116)
ALT SERPL W P-5'-P-CCNC: 13 U/L (ref 12–78)
ANION GAP SERPL CALCULATED.3IONS-SCNC: 7 MMOL/L (ref 4–13)
AST SERPL W P-5'-P-CCNC: 9 U/L (ref 5–45)
BILIRUB SERPL-MCNC: 0.23 MG/DL (ref 0.2–1)
BUN SERPL-MCNC: 5 MG/DL (ref 5–25)
CALCIUM SERPL-MCNC: 8.6 MG/DL (ref 8.3–10.1)
CHLORIDE SERPL-SCNC: 111 MMOL/L (ref 100–108)
CO2 SERPL-SCNC: 21 MMOL/L (ref 21–32)
CREAT SERPL-MCNC: 0.62 MG/DL (ref 0.6–1.3)
CREAT UR-MCNC: 114 MG/DL
ERYTHROCYTE [DISTWIDTH] IN BLOOD BY AUTOMATED COUNT: 14.6 % (ref 11.6–15.1)
GFR SERPL CREATININE-BSD FRML MDRD: 123 ML/MIN/1.73SQ M
GLUCOSE 1H P 50 G GLC PO SERPL-MCNC: 159 MG/DL
GLUCOSE SERPL-MCNC: 156 MG/DL (ref 65–140)
HCT VFR BLD AUTO: 32.3 % (ref 34.8–46.1)
HGB BLD-MCNC: 10.9 G/DL (ref 11.5–15.4)
MCH RBC QN AUTO: 29.9 PG (ref 26.8–34.3)
MCHC RBC AUTO-ENTMCNC: 33.7 G/DL (ref 31.4–37.4)
MCV RBC AUTO: 89 FL (ref 82–98)
PLATELET # BLD AUTO: 169 THOUSANDS/UL (ref 149–390)
PMV BLD AUTO: 11.7 FL (ref 8.9–12.7)
POTASSIUM SERPL-SCNC: 3.1 MMOL/L (ref 3.5–5.3)
PROT SERPL-MCNC: 6.9 G/DL (ref 6.4–8.2)
PROT UR-MCNC: 29 MG/DL
PROT/CREAT UR: 0.25 MG/G{CREAT} (ref 0–0.1)
RBC # BLD AUTO: 3.65 MILLION/UL (ref 3.81–5.12)
SODIUM SERPL-SCNC: 139 MMOL/L (ref 136–145)
WBC # BLD AUTO: 10.22 THOUSAND/UL (ref 4.31–10.16)

## 2020-09-09 PROCEDURE — 84156 ASSAY OF PROTEIN URINE: CPT

## 2020-09-09 PROCEDURE — 36415 COLL VENOUS BLD VENIPUNCTURE: CPT

## 2020-09-09 PROCEDURE — 87086 URINE CULTURE/COLONY COUNT: CPT

## 2020-09-09 PROCEDURE — 80053 COMPREHEN METABOLIC PANEL: CPT

## 2020-09-09 PROCEDURE — 82950 GLUCOSE TEST: CPT

## 2020-09-09 PROCEDURE — 82570 ASSAY OF URINE CREATININE: CPT

## 2020-09-09 PROCEDURE — 85027 COMPLETE CBC AUTOMATED: CPT

## 2020-09-10 ENCOUNTER — ROUTINE PRENATAL (OUTPATIENT)
Dept: OBGYN CLINIC | Facility: CLINIC | Age: 30
End: 2020-09-10

## 2020-09-10 VITALS
TEMPERATURE: 99.9 F | BODY MASS INDEX: 30.04 KG/M2 | WEIGHT: 159 LBS | HEART RATE: 66 BPM | SYSTOLIC BLOOD PRESSURE: 100 MMHG | DIASTOLIC BLOOD PRESSURE: 67 MMHG

## 2020-09-10 DIAGNOSIS — O99.012 ANEMIA DURING PREGNANCY IN SECOND TRIMESTER: ICD-10-CM

## 2020-09-10 DIAGNOSIS — Z3A.25 25 WEEKS GESTATION OF PREGNANCY: ICD-10-CM

## 2020-09-10 DIAGNOSIS — O23.00 PYELONEPHRITIS DURING PREGNANCY: ICD-10-CM

## 2020-09-10 DIAGNOSIS — O99.210 OBESITY IN PREGNANCY: ICD-10-CM

## 2020-09-10 DIAGNOSIS — O99.810 ABNORMAL GLUCOSE AFFECTING PREGNANCY: Primary | ICD-10-CM

## 2020-09-10 DIAGNOSIS — N89.8 VAGINA ITCHING: ICD-10-CM

## 2020-09-10 PROBLEM — O99.019 ANEMIA IN PREGNANCY: Status: ACTIVE | Noted: 2020-08-13

## 2020-09-10 PROBLEM — O12.10 PROTEINURIA DURING PREGNANCY: Status: ACTIVE | Noted: 2020-09-10

## 2020-09-10 LAB — BACTERIA UR CULT: NORMAL

## 2020-09-10 PROCEDURE — 99215 OFFICE O/P EST HI 40 MIN: CPT | Performed by: NURSE PRACTITIONER

## 2020-09-10 RX ORDER — HYDROXYPROGESTERONE CAPROATE 250 MG/ML
275 INJECTION SUBCUTANEOUS
COMMUNITY
End: 2020-11-16

## 2020-09-10 RX ORDER — FLUCONAZOLE 150 MG/1
150 TABLET ORAL ONCE
Qty: 1 TABLET | Refills: 0 | Status: SHIPPED | OUTPATIENT
Start: 2020-09-10 | End: 2020-09-10

## 2020-09-10 NOTE — PROGRESS NOTES
Glenda Parker presents today for routine OB visit at 25w2d  Blood Pressure: 100/67  Wt=72 1 kg (159 lb); Body mass index is 30 04 kg/m² ; TWG=-1 361 kg (-3 lb)  Fetal Heart Rate: 155; Fundal Height (cm): 26 cm  Urine culture was collected yesterday - results still pending  Will await results to determine if antibiotic therapy or suppression appropriate  Abdomen: gravid, soft, non-tender  She reports vaginal itching  Given Rx Diflucan  Given Galatia injection today  Denies uterine contractions  Denies vaginal bleeding or leaking of fluid  Reports adequate fetal movement of at least 10 movements in 2 hours once daily  Bloodwork for 1h glucola completed yesterday  Needs to have 3h GTT performed  Explained to patient  Will complete third trimester labs as well  Scheduled for MFM growth ultrasound yesterday but missed appointment  She will reschedule it ASAP  Reviewed premature labor precautions and fetal kick counts  Advised to continue medications and return in 1 week for next Galatia injection        Current Outpatient Medications   Medication Instructions    ferrous sulfate 324 mg, Oral, 2 times daily before meals    Galatia 275 mg, Subcutaneous, Every 7 days    Prenatal Vit-Fe Fumarate-FA (PRENATAL VITAMIN) 27-0 8 MG TABS 1 tablet, Oral, Daily         G4 Problems (from 04/29/20 to present)     Problem Noted Resolved    Abnormal glucola 9/10/2020 by THAI Erazo No    Overview Signed 9/10/2020  1:04 PM by THAI Erazo     Needs 3h GTT         Proteinuria during pregnancy 9/10/2020 by THAI Erazo No    Overview Signed 9/10/2020  1:08 PM by THAI Erazo     Noted urine PC ratio 8/21/20 (0 39) - likely associated with UTI's/pyelonephritis  Needs repeat - done 9/9/20 (0 25)         Hydronephrosis 9/8/2020 by Mi Aranda MD No    Overview Addendum 9/10/2020 12:52 PM by THAI Erazo     Right hydronephrosis noted 8/12/20 during inpatient stay for pyelonephritis  Nephrostomy tube placement vs stent placement were considered but ultimately no procedure was required per urology  Needs follow up with urology outpatient - done 9/8/20 (no changes to care plan recommended)         Anemia in pregnancy 8/13/2020 by Usman Russell DO No    Overview Signed 9/10/2020 11:22 AM by THAI Barraza     Needs Fe supp - taking         Pyelonephritis during pregnancy 5/13/2020 by THAI Barraza No    Overview Addendum 9/10/2020  1:02 PM by THAI Barraza     Noted urine culture with E  Coli 5/11/20 - tx with Macrobid 5/13/20  Noted urine culture with E  Coli 6/24/20 - tx with Keflex 6/24/20  Noted urine culture with E  Coli 8/3/20 - tx with Macrobid 8/3/20  Hospitalized 8/12/20-8/15/20 for R pyelonephritis - tx with 4 days IV abx, DC home w/14 days PO Cefpodoxime  Noted increased size of known R renal cyst from 1x1cm to 3x3cm; significant R hydronephrosis - urology consultation done  Needs follow up urine culture - collected 9/9/20 - results pending  Consider abx suppression         Fetal ultrasound 4/29/2020 by THAI Barraza No    Overview Addendum 9/10/2020 11:44 AM by THAI Barraza     6/17/20 (13w1d) EDC confirmed, NT WNL  8/3/20 (19w6d) no previa, C4 6cm, donna WNL & complete         Genetic screening 4/29/2020 by THAI Barraza No    Overview Addendum 9/10/2020 11:24 AM by THAI Barraza     Sequential Screen part 1 negative  Did not have part 2 drawn         Contraception management 4/29/2020 by THAI Barraza No    Overview Addendum 9/10/2020 11:22 AM by THAI Barraza     Reviewed with patient surgical sterilization is PERMANENT STERILIZATION and NOT REVERSIBLE  Discussed other temporary contraceptive options (including LARC's)  Reviewed risks & benefits of surgical sterilization  Patient verbally rejects other options and desires surgical sterilization  Sterilization consent (MA 31 form) signed 8/27/2020  Previous c/s x3 4/29/2020 by THAI Gardner No    Overview Signed 4/29/2020  3:44 PM by THAI Gardner     Need records  Needs repeat c/s @39 weeks         Prior IUGR 4/29/2020 by THAI Gardner No    Overview Signed 4/29/2020  3:44 PM by THAI Gardner     Serial growth scans         History of PTD @32 weeks 4/29/2020 by THAI Gardner No    Overview Addendum 9/10/2020 11:42 AM by THAI Gardner     Desires [place] (had [place] in previous 2 pregnancies) - started @16 weeks, continue through 37 weeks  Needs serial CL measurements - done         Obesity in pregnancy 4/29/2020 by THAI Gardner No    Overview Addendum 5/13/2020  8:51 AM by THAI Gardner     Pre-pregnant BMI=30 6  Needs early GDM screen - done WNL  Serial growth scans

## 2020-09-10 NOTE — PATIENT INSTRUCTIONS
SENALES GREGORY EL EMBARAZO  Llame a nuestra oficina al [phone] para cualquiera de los siguientes:    1  sangrado vaginal  2  Dolor abdominal mindy que no desaparece  3  Fiebre (más de 100 4 y no se kashif con Tylenol)  4  Vómitos persistentes que eugene más de 24 horas  5  dolor de pecho  6  Dolor o ardor al orinar  7  Dolor de oscar severo que no se resuelve con Tylenol  8  Visión borrosa o jignesh puntos en barnes visión  9  Hinchazón repentina de barnes lisseth o leoncio  10  Enrojecimiento, hinchazón o dolor en mateo pierna  11  Un aumento de peso repentino en pocos días  12  Contar los movimientos fetales del bebé  (después de 28 semanas o el sexto mes de embarazo)  15  Mateo pérdida de líquido acuoso de la vagina: puede ser un chorro, un goteo o mateo humedad continua  14   Después de 20 semanas de embarazo, calambres rítmicos (más de 4 por hora) o menstruales erika dolor [place] / Gregary Riis

## 2020-09-11 ENCOUNTER — TELEPHONE (OUTPATIENT)
Dept: OBGYN CLINIC | Facility: CLINIC | Age: 30
End: 2020-09-11

## 2020-09-11 DIAGNOSIS — O23.00 PYELONEPHRITIS DURING PREGNANCY: Primary | ICD-10-CM

## 2020-09-11 RX ORDER — NITROFURANTOIN 25; 75 MG/1; MG/1
100 CAPSULE ORAL DAILY
Qty: 30 CAPSULE | Refills: 5 | Status: ON HOLD | OUTPATIENT
Start: 2020-09-11 | End: 2020-12-17 | Stop reason: SDUPTHER

## 2020-09-11 NOTE — TELEPHONE ENCOUNTER
----- Message from Hong Vang MD sent at 9/11/2020 11:49 AM EDT -----  Regarding: RE: antibiotics  If she has had pyelonephritis during pregnancy and it is standard of care to use antibiotic suppression I do feel it would be a reasonable option at this time  ----- Message -----  From: THAI Zamora  Sent: 9/10/2020   7:32 PM EDT  To: Hong Vang MD, THAI Zamora  Subject: antibiotics                                      You have seen mutual this patient 2 days ago  She is doing well  Typically if a patient has had pyelonephritis during pregnancy, we continue the remainder of their pregnancy on antibiotic suppression  Her urine culture from yesterday is negative  Do you recommend that I go ahead and start her on Macrobid suppression with 100mg daily?   What do you suggest?    Thank you,  Amado Brown

## 2020-09-11 NOTE — TELEPHONE ENCOUNTER
Called patient and advised her to start Macrobid 100mg daily for suppressive therapy  Rx sent to her pharmacy  She verbalizes understanding

## 2020-09-14 ENCOUNTER — TELEPHONE (OUTPATIENT)
Dept: OBGYN CLINIC | Facility: CLINIC | Age: 30
End: 2020-09-14

## 2020-09-14 ENCOUNTER — TELEPHONE (OUTPATIENT)
Dept: PERINATAL CARE | Facility: CLINIC | Age: 30
End: 2020-09-14

## 2020-09-14 NOTE — TELEPHONE ENCOUNTER
Patient notified that her urine culture was negative and her urine also did not show an abnormal amount of protein   She was also reminded to get the 3 hour glucose done soon

## 2020-09-14 NOTE — TELEPHONE ENCOUNTER
Spoke to patient to confirm 9/23/20 at 1 PM for detailed anatomy ultrasound in our [facility] office  Patient verbalized understanding of time date and location

## 2020-09-15 ENCOUNTER — TELEMEDICINE (OUTPATIENT)
Dept: FAMILY MEDICINE CLINIC | Facility: CLINIC | Age: 30
End: 2020-09-15

## 2020-09-15 ENCOUNTER — TELEPHONE (OUTPATIENT)
Dept: OBGYN CLINIC | Facility: CLINIC | Age: 30
End: 2020-09-15

## 2020-09-15 ENCOUNTER — PATIENT OUTREACH (OUTPATIENT)
Dept: OBGYN CLINIC | Facility: CLINIC | Age: 30
End: 2020-09-15

## 2020-09-15 DIAGNOSIS — J06.9 VIRAL UPPER RESPIRATORY TRACT INFECTION: Primary | ICD-10-CM

## 2020-09-15 PROCEDURE — 99214 OFFICE O/P EST MOD 30 MIN: CPT | Performed by: PHYSICIAN ASSISTANT

## 2020-09-15 NOTE — PROGRESS NOTES
Virtual Brief Visit    Assessment/Plan:    Problem List Items Addressed This Visit     None      Visit Diagnoses     Viral upper respiratory tract infection    -  Primary      Recommend nasal saline, salt gargles, loratadine  Call if she finds out son is positive for COVID  If no change in sytmpoms contact the office after 5 days          Reason for visit is   Chief Complaint   Patient presents with    Virtual Brief Visit        Encounter provider [facility]    Provider located at [address] [address] [phone]    Recent Visits  No visits were found meeting these conditions  Showing recent visits within past 7 days and meeting all other requirements     Today's Visits  Date Type Provider Dept   09/15/20 Telemedicine  [facility]  [facility]   Showing today's visits and meeting all other requirements     Future Appointments  No visits were found meeting these conditions  Showing future appointments within next 150 days and meeting all other requirements        After connecting through telephone, the patient was identified by name and date of birth  Abimbola Victor was informed that this is a telemedicine visit and that the visit is being conducted through telephone  My office door was closed  No one else was in the room  She acknowledged consent and understanding of privacy and security of the platform  The patient has agreed to participate and understands she can discontinue the visit at any time  It was my intent to perform this visit via video technology but the patient was not able to do a video connection so the visit was completed via audio telephone alone  Patient is aware this is a billable service  Subjective    Abimbola Victor is a 31 y o  female for same day/walk in visit  She has been sick for 3 days  She has congestion, sore throat, rhinorrhea   Her son was sick with similar but is now feeling better  He was tested for COVID but has not heard any results yet but they told her they wouldn't call unless positive  She has had a little cough because she feels an itchy throat  No vomiting or diarrhea  She is currently pregnant  She has not taken anything because of this  HPI     Past Medical History:   Diagnosis Date    Chlamydia        Past Surgical History:   Procedure Laterality Date     SECTION  , ,        Current Outpatient Medications   Medication Sig Dispense Refill    ferrous sulfate 324 (65 Fe) mg Take 1 tablet (324 mg total) by mouth 2 (two) times a day before meals 90 tablet 3    HYDROXYprogesterone Caproate ([place]) 275 MG/1 1ML injection Inject 275 mg under the skin every 7 days Begin treatment between 16 weeks, 0 days and 20 weeks, 6 days of gestation  Continue administration once weekly until week 37 (through 36 weeks, 6 days) of gestation or delivery, whichever occurs first       nitrofurantoin (MACROBID) 100 mg capsule Take 1 capsule (100 mg total) by mouth daily 30 capsule 5    Prenatal Vit-Fe Fumarate-FA (PRENATAL VITAMIN) 27-0 8 MG TABS Take 1 tablet by mouth daily 90 tablet 4     Current Facility-Administered Medications   Medication Dose Route Frequency Provider Last Rate Last Dose    HYDROXYprogesterone Caproate (YOHAN) injection 275 mg  275 mg Subcutaneous Q7 Days THAI Gruber   275 mg at 20 1304        Allergies   Allergen Reactions    Meperidine Rash       Review of Systems   Constitutional: Negative for activity change, appetite change, chills, fatigue and unexpected weight change  HENT: Positive for congestion, rhinorrhea and sore throat  Negative for dental problem, ear pain and hearing loss  Eyes: Negative for visual disturbance  Respiratory: Positive for cough  Negative for shortness of breath and wheezing  Cardiovascular: Negative for chest pain     Gastrointestinal: Negative for abdominal pain, constipation, diarrhea and vomiting  Genitourinary: Negative for dysuria and vaginal bleeding  Musculoskeletal: Negative for arthralgias, back pain and myalgias  Skin: Negative for rash  Neurological: Negative for dizziness and headaches  Psychiatric/Behavioral: Negative for behavioral problems  There were no vitals filed for this visit  I spent 16 minutes directly with the patient during this visit    VIRTUAL VISIT DISCLAIMER    Alix Sat acknowledges that she has consented to an online visit or consultation  She understands that the online visit is based solely on information provided by her, and that, in the absence of a face-to-face physical evaluation by the physician, the diagnosis she receives is both limited and provisional in terms of accuracy and completeness  This is not intended to replace a full medical face-to-face evaluation by the physician  Alix Joyner understands and accepts these terms

## 2020-09-16 ENCOUNTER — TELEPHONE (OUTPATIENT)
Dept: OBGYN CLINIC | Facility: CLINIC | Age: 30
End: 2020-09-16

## 2020-09-17 ENCOUNTER — ROUTINE PRENATAL (OUTPATIENT)
Dept: OBGYN CLINIC | Facility: CLINIC | Age: 30
End: 2020-09-17

## 2020-09-17 ENCOUNTER — ROUTINE PRENATAL (OUTPATIENT)
Dept: PERINATAL CARE | Facility: OTHER | Age: 30
End: 2020-09-17
Payer: COMMERCIAL

## 2020-09-17 VITALS
HEART RATE: 73 BPM | TEMPERATURE: 98.4 F | BODY MASS INDEX: 31.06 KG/M2 | WEIGHT: 164.4 LBS | DIASTOLIC BLOOD PRESSURE: 76 MMHG | SYSTOLIC BLOOD PRESSURE: 117 MMHG

## 2020-09-17 DIAGNOSIS — O99.810 ABNORMAL GLUCOSE AFFECTING PREGNANCY: ICD-10-CM

## 2020-09-17 DIAGNOSIS — Z36.9 ENCOUNTER FOR FETAL ULTRASOUND: ICD-10-CM

## 2020-09-17 DIAGNOSIS — Z3A.26 26 WEEKS GESTATION OF PREGNANCY: Primary | ICD-10-CM

## 2020-09-17 DIAGNOSIS — O09.892 HISTORY OF PREMATURE DELIVERY, CURRENTLY PREGNANT, SECOND TRIMESTER: ICD-10-CM

## 2020-09-17 DIAGNOSIS — O99.012 ANEMIA DURING PREGNANCY IN SECOND TRIMESTER: ICD-10-CM

## 2020-09-17 DIAGNOSIS — O23.00 PYELONEPHRITIS DURING PREGNANCY: Primary | ICD-10-CM

## 2020-09-17 DIAGNOSIS — Z3A.25 25 WEEKS GESTATION OF PREGNANCY: ICD-10-CM

## 2020-09-17 DIAGNOSIS — O09.299 PRIOR PREGNANCY COMPLICATED BY IUGR, ANTEPARTUM: ICD-10-CM

## 2020-09-17 DIAGNOSIS — Z30.8 ENCOUNTER FOR OTHER CONTRACEPTIVE MANAGEMENT: ICD-10-CM

## 2020-09-17 DIAGNOSIS — O09.899 HISTORY OF PRETERM DELIVERY, CURRENTLY PREGNANT: ICD-10-CM

## 2020-09-17 DIAGNOSIS — O09.292 PREVIOUS PREGNANCY COMPLICATED BY INTRAUTERINE GROWTH RESTRICTION, ANTEPARTUM, SECOND TRIMESTER: ICD-10-CM

## 2020-09-17 DIAGNOSIS — O34.219 PREVIOUS CESAREAN DELIVERY, ANTEPARTUM: ICD-10-CM

## 2020-09-17 PROCEDURE — PNV: Performed by: OBSTETRICS & GYNECOLOGY

## 2020-09-17 PROCEDURE — 76816 OB US FOLLOW-UP PER FETUS: CPT | Performed by: OBSTETRICS & GYNECOLOGY

## 2020-09-17 PROCEDURE — 96372 THER/PROPH/DIAG INJ SC/IM: CPT

## 2020-09-17 NOTE — ASSESSMENT & PLAN NOTE
Patient was recommended to complete 3hr GTT but has been unable due to  concerns  Patient referred to diabetic education and recommended blood sugar checks if she is unable to complete 3hr GTT

## 2020-09-17 NOTE — ASSESSMENT & PLAN NOTE
- Continue prenatal vitamin  - Delivery: RLTCS  - Contraception: Tubal   - Reviewed fetal kick counts  - Reviewed PTL signs and symptoms

## 2020-09-17 NOTE — PROGRESS NOTES
Please refer to the [facility] ultrasound report in Ob Procedures for additional information regarding today's visit

## 2020-09-17 NOTE — PROGRESS NOTES
OB/GYN  PN Visit  Keya Myles  [id number]  2020  12:15 PM  Dr Munira Miranda MD    S: 31 y o  [id number] 26w2d here for PN visit  She denies contractions  She denies leakage of fluid and vaginal bleeding  She reports good fetal movement  Her pregnancy is complicated by history of  delivery, history of 3 prior  sections, pyelonephritis during this pregnancy, and anemia  O:  Vitals:    20 1120   BP: 117/76   Pulse: 73   Temp: 98 4 °F (36 9 °C)     Physical Exam  Vitals signs reviewed  Exam conducted with a chaperone present  Constitutional:       General: She is not in acute distress  Appearance: She is well-developed  She is not diaphoretic  Cardiovascular:      Rate and Rhythm: Normal rate  Pulmonary:      Effort: Pulmonary effort is normal    Abdominal:      Palpations: Abdomen is soft  Genitourinary:     Comments: Gravid, nontender  FHT: 150bpm  Skin:     General: Skin is warm and dry  Neurological:      Mental Status: She is alert and oriented to person, place, and time  Psychiatric:         Behavior: Behavior normal        A/P:  Problem List Items Addressed This Visit        Unprioritized    Fetal ultrasound     20 (13w1d) EDC confirmed, NT WNL  8/3/20 (19w6d) no previa, C4 6cm, donna WNL & complete  Today: Repeat growth scan performed with Dr Mattie Blum given patient's difficulties with childcare         Contraception management     - Patient desires surgical sterilization  Discussed with pt LARC methods of contraception including intrauterine device, Nexplanon, and Depo Provera in addition to surgical sterilization  Discussed the risks, benefits, and alternatives to each  Discussed that surgical sterilization is a PERMANENT and IRREVERSIBLE surgical procedure; pt demonstrated understanding and continued desire to proceed  Patient is okay if she does not have any more children, even if she dates a different partner or loses her children    - MA 31 form signed 2020          Previous c/s x3     - Plan for RLTCS with tubal sterilization  - Patient requests Friday appointment due to  issues  Will discuss closer to her due date  She is working to get someone from [facility] available for her delivery          Prior IUGR     - Repeat growth scan performed today  - Recommend additional growth scan in 6 weeks         History of PTD @32 weeks     - Continue Lyles weekly until 37 week  - No signs or symptoms of  labor at this time  - Reviewed precautions  - CL was 3 5cm on 2020         Pyelonephritis during pregnancy - Primary     - s/p hospitalization -8/15 due to presumed pyelonephritis  She was treated with IV antibiotics (ceftriaxone then changed to cefepime per ID) for 4 days and discharged home with Cefpodoxime 400mg BID for a total of 14 days  She endorses still taking it twice daily    - Urine culture from  negative (mixed contaminates)  Repeat urine culture was negative (2020)  - She is s/p her appointment with urology who recommended no changes to current regimen  She has a follow up appointment with them on 2020   - Urine P:C ratio was elevated at 0 39 on   BP wnl  No other signs or symptoms of preeclampsia  Repeat Urine P:C ratio was 0 25 on            25 weeks gestation of pregnancy     - Continue prenatal vitamin  - Delivery: RLTCS  - Contraception: Tubal   - Reviewed fetal kick counts  - Reviewed PTL signs and symptoms         Anemia in pregnancy     - Continue iron and colace  - Hgb  had improved to 10 9  - Patient recently stopped taking iron but has been encouraged to restart         Abnormal glucola     Patient was recommended to complete 3hr GTT but has been unable due to  concerns  Patient referred to diabetic education and recommended blood sugar checks if she is unable to complete 3hr GTT            Relevant Orders    Ambulatory referral to Diabetic Education          Future Appointments   Date Time Provider Department Center   9/24/2020  3:30 PM OBGYN NURSE [facility]   10/1/2020  3:45 PM OBGYN PGY-1 RESIDENT [facility]   10/8/2020  3:30 PM OBGYN NURSE [facility]   10/15/2020  3:45 PM Girtha Oh, CRNP [facility]   10/22/2020  3:45 PM OBGYN NURSE [facility]   10/29/2020  3:45 PM OBGYN PGY-1 RESIDENT [facility]   11/5/2020  3:45 PM OBGYN NURSE [facility]   11/12/2020  3:45 PM Girtha Oh, CRNP [facility]   11/19/2020  3:45 PM OBGYN NURSE [facility]   11/25/2020  3:45 PM OBGYN PGY-1 RESIDENT [facility]   12/3/2020  3:45 PM Girtha Oh, CRNP [facility]   12/10/2020  3:45 PM OBGYN PGY-3 RESIDENT [facility]   12/17/2020  3:30 PM Girtha Oh, CRNP [facility]   1/12/2021  3:30 PM MD Rodolfo Petty MD  9/17/2020  12:15 PM

## 2020-09-17 NOTE — ASSESSMENT & PLAN NOTE
- Plan for RLTCS with tubal sterilization  - Patient requests Friday appointment due to  issues  Will discuss closer to her due date   She is working to get someone from [facility] available for her delivery

## 2020-09-17 NOTE — ASSESSMENT & PLAN NOTE
- Continue Marybeth weekly until 37 week  - No signs or symptoms of  labor at this time  - Reviewed precautions  - CL was 3 5cm on 2020

## 2020-09-17 NOTE — ASSESSMENT & PLAN NOTE
6/17/20 (13w1d) EDC confirmed, NT WNL  8/3/20 (19w6d) no previa, C4 6cm, donna WNL & complete  Today: Repeat growth scan performed with Dr Herlinda Yoo given patient's difficulties with childcare

## 2020-09-17 NOTE — ASSESSMENT & PLAN NOTE
- s/p hospitalization 8/12-8/15 due to presumed pyelonephritis  She was treated with IV antibiotics (ceftriaxone then changed to cefepime per ID) for 4 days and discharged home with Cefpodoxime 400mg BID for a total of 14 days  She endorses still taking it twice daily    - Urine culture from 8/21 negative (mixed contaminates)  Repeat urine culture was negative (9/9/2020)  - She is s/p her appointment with urology who recommended no changes to current regimen  She has a follow up appointment with them on 1/21/2020   - Urine P:C ratio was elevated at 0 39 on 8/21  BP wnl  No other signs or symptoms of preeclampsia  Repeat Urine P:C ratio was 0 25 on 9/9

## 2020-09-17 NOTE — ASSESSMENT & PLAN NOTE
- Continue iron and colace  - Hgb 9/9 had improved to 10 9  - Patient recently stopped taking iron but has been encouraged to restart

## 2020-09-17 NOTE — PATIENT INSTRUCTIONS
Pregnancy at 32 to 30 Weeks   AMBULATORY CARE:   What changes are happening to your body: You may notice new symptoms such as shortness of breath, heartburn, or swelling of your ankles and feet  You may also have trouble sleeping or contractions  Seek care immediately if:   · You develop a severe headache that does not go away  · You have new or increased vision changes, such as blurred or spotted vision  · You have new or increased swelling in your face or hands  · You have vaginal spotting or bleeding  · Your water broke or you feel warm water gushing or trickling from your vagina  Contact your healthcare provider if:   · You have more than 5 contractions in 1 hour  · You notice any changes in your baby's movements  · You have abdominal cramps, pressure, or tightening  · You have a change in vaginal discharge  · You have chills or a fever  · You have vaginal itching, burning, or pain  · You have yellow, green, white, or foul-smelling vaginal discharge  · You have pain or burning when you urinate, less urine than usual, or pink or bloody urine  · You have questions or concerns about your condition or care  How to care for yourself at this stage of your pregnancy:   · Eat a variety of healthy foods  Healthy foods include fruits, vegetables, whole-grain breads, low-fat dairy foods, beans, lean meats, and fish  Drink liquids as directed  Ask how much liquid to drink each day and which liquids are best for you  Limit caffeine to less than 200 milligrams each day  Limit your intake of fish to 2 servings each week  Choose fish low in mercury such as canned light tuna, shrimp, salmon, cod, or tilapia  Do not  eat fish high in mercury such as swordfish, tilefish, elva mackerel, and shark  · Manage heartburn  by eating 4 or 5 small meals each day instead of large meals  Avoid spicy food  · Manage swelling  by lying down and putting your feet up       · Take prenatal vitamins as directed  Your need for certain vitamins and minerals, such as folic acid, increases during pregnancy  Prenatal vitamins provide some of the extra vitamins and minerals you need  Prenatal vitamins may also help to decrease the risk of certain birth defects  · Talk to your healthcare provider about exercise  Moderate exercise can help you stay fit  Your healthcare provider will help you plan an exercise program that is safe for you during pregnancy  · Do not smoke  If you smoke, it is never too late to quit  Smoking increases your risk of a miscarriage and other health problems during your pregnancy  Smoking can cause your baby to be born too early or weigh less at birth  Ask your healthcare provider for information if you need help quitting  · Do not drink alcohol  Alcohol passes from your body to your baby through the placenta  It can affect your baby's brain development and cause fetal alcohol syndrome (FAS)  FAS is a group of conditions that causes mental, behavior, and growth problems  · Talk to your healthcare provider before you take any medicines  Many medicines may harm your baby if you take them when you are pregnant  Do not take any medicines, vitamins, herbs, or supplements without first talking to your healthcare provider  Never use illegal or street drugs (such as marijuana or cocaine) while you are pregnant  Safety tips during pregnancy:   · Avoid hot tubs and saunas  Do not use a hot tub or sauna while you are pregnant, especially during your first trimester  Hot tubs and saunas may raise your baby's temperature and increase the risk of birth defects  · Avoid toxoplasmosis  This is an infection caused by eating raw meat or being around infected cat feces  It can cause birth defects, miscarriages, and other problems  Wash your hands after you touch raw meat  Make sure any meat is well-cooked before you eat it  Avoid raw eggs and unpasteurized milk   Use gloves or ask someone else to clean your cat's litter box while you are pregnant  Changes that are happening with your baby:  By 30 weeks, your baby may weigh more than 3 pounds  Your baby may be about 11 inches long from the top of the head to the rump (baby's bottom)  Your baby's eyes open and close now  Your baby's kicks and movements are more forceful at this time  What you need to know about prenatal care: Your healthcare provider will check your blood pressure and weight  You may also need the following:  · Blood tests  may be done to check for anemia or blood type  · A urine test  may also be done to check for sugar and protein  These can be signs of gestational diabetes or infection  Protein in your urine may also be a sign of preeclampsia  Preeclampsia is a condition that can develop during week 20 or later of your pregnancy  It causes high blood pressure, and it can cause problems with your kidneys and other organs  · A Tdap vaccine and flu vaccine  may be recommended by your healthcare provider  · A gestational diabetes screen  will be done using an oral glucose tolerance test (OGTT)  An OGTT starts with a blood sugar level check after you have not eaten for 8 hours  You are then given a glucose drink  Your blood sugar level is checked after 1 hour, 2 hours, and sometimes 3 hours  Healthcare providers look at how much your blood sugar level increases from the first check  · Fundal height  is a measurement of your uterus to check your baby's growth  This number is usually the same as the number of weeks that you have been pregnant  Your healthcare provider may also check your baby's position  · Your baby's heart rate  will be checked  © 2017 [facility] Information is for End User's use only and may not be sold, redistributed or otherwise used for commercial purposes   All illustrations and images included in CareNotes® are the copyrighted property of A D A M [employer]  or [employer]  The above information is an  only  It is not intended as medical advice for individual conditions or treatments  Talk to your doctor, nurse or pharmacist before following any medical regimen to see if it is safe and effective for you

## 2020-09-17 NOTE — LETTER
2020     [address] [facility]  [address], [address] [facility]  [place]   [address]  [id number]    Patient: Starr Albert   YOB: 1990   Date of Visit: 2020       Dear Dr Mcmillan Na:    Thank you for referring Starr Albert to me for evaluation  Below are my notes for this consultation  If you have questions, please do not hesitate to call me  I look forward to following your patient along with you  Sincerely,        CHEW  VIRTUAL VISIT        CC: No Recipients  Leni Victoria MD  2020  1:45 PM  Sign when Signing Visit  Please refer to the [facility] ultrasound report in Ob Procedures for additional information regarding today's visit

## 2020-09-23 ENCOUNTER — TELEPHONE (OUTPATIENT)
Dept: OBGYN CLINIC | Facility: CLINIC | Age: 30
End: 2020-09-23

## 2020-09-24 ENCOUNTER — CLINICAL SUPPORT (OUTPATIENT)
Dept: OBGYN CLINIC | Facility: CLINIC | Age: 30
End: 2020-09-24

## 2020-09-24 VITALS
BODY MASS INDEX: 30.61 KG/M2 | SYSTOLIC BLOOD PRESSURE: 107 MMHG | WEIGHT: 162 LBS | TEMPERATURE: 98.2 F | HEART RATE: 76 BPM | DIASTOLIC BLOOD PRESSURE: 66 MMHG

## 2020-09-24 DIAGNOSIS — O09.899 HISTORY OF PRETERM DELIVERY, CURRENTLY PREGNANT: ICD-10-CM

## 2020-09-24 DIAGNOSIS — O99.810 ABNORMAL GLUCOSE TOLERANCE TEST DURING PREGNANCY, ANTEPARTUM: Primary | ICD-10-CM

## 2020-09-24 DIAGNOSIS — Z3A.27 27 WEEKS GESTATION OF PREGNANCY: ICD-10-CM

## 2020-09-24 PROCEDURE — 96372 THER/PROPH/DIAG INJ SC/IM: CPT

## 2020-09-24 RX ORDER — BLOOD SUGAR DIAGNOSTIC
STRIP MISCELLANEOUS
Qty: 50 EACH | Refills: 0 | Status: SHIPPED | OUTPATIENT
Start: 2020-09-24 | End: 2020-10-13 | Stop reason: SDUPTHER

## 2020-09-24 RX ORDER — LANCETS 33 GAUGE
EACH MISCELLANEOUS
Qty: 100 EACH | Refills: 0 | Status: SHIPPED | OUTPATIENT
Start: 2020-09-24

## 2020-09-24 RX ORDER — BLOOD-GLUCOSE METER
EACH MISCELLANEOUS
Qty: 1 KIT | Refills: 0 | Status: SHIPPED | OUTPATIENT
Start: 2020-09-24

## 2020-09-24 NOTE — PROGRESS NOTES
Pt here today for [place] injection   Given in the left arm  [facility]#[id number]  ENT#[id number]  EXP#6/2022

## 2020-09-28 ENCOUNTER — TELEMEDICINE (OUTPATIENT)
Dept: PERINATAL CARE | Facility: CLINIC | Age: 30
End: 2020-09-28
Payer: COMMERCIAL

## 2020-09-28 DIAGNOSIS — O99.810 ABNORMAL GLUCOSE AFFECTING PREGNANCY: ICD-10-CM

## 2020-09-28 DIAGNOSIS — O99.810 ABNORMAL GLUCOSE TOLERANCE TEST DURING PREGNANCY, ANTEPARTUM: Primary | ICD-10-CM

## 2020-09-28 DIAGNOSIS — Z3A.27 27 WEEKS GESTATION OF PREGNANCY: ICD-10-CM

## 2020-09-28 PROCEDURE — G0108 DIAB MANAGE TRN  PER INDIV: HCPCS | Performed by: DIETITIAN, REGISTERED

## 2020-09-28 NOTE — PATIENT INSTRUCTIONS
1   new glucometer onetouch verio flex and testing supplies at pharmacy on file  2  Start testing blood sugar 4 times daily, fasting and 2 hours after each meal  3  Call in blood sugars for 1 week of testing via telephone at [phone]    4  If start testing Tuesday, 9/29 please call in results by Tuesday 10/6 to be reviewed and to determine if have GDM diagnosis

## 2020-09-28 NOTE — PROGRESS NOTES
Patient opted to test blood sugar for 1 week over going for 3 hr GTT due to not having   Glucose Meter Education:    Type of blood sugar meter provided: OneTouch Verio Flex  Blood sugar reading during demo: patient had one touch verio ultra mini meter that was picked up at her pharmacy  One touch verio flex ordered but was given different brand from her pharmacy  Called pharmacy to have then refill correct order so that patient can start testing  Was given demonstration on onetouch verio flex meter, and encouraged patient to call with any questions if have after picking up meter and supplies  The patient received the following education on blood glucose monitoring during pregnancy:   Pt instructed on testing blood sugars: 4 x per day (Fasting, 2 hour after start of each meal)   Instruction on how to complete BG log booklets provided   Patient encouraged to provide comments of dietary intake next to elevated readings   Report blood glucose levels to [facility] weekly or as otherwise directed via:  o Phone: [phone]  If no response in 24 hours, call [phone]   o Fax: [phone]  o My Chart (messaging, flow sheet)    Blood Sugar Goal  Ranges:    Fastin-90 mg/dL   1 hour after the start of each meal: 135 mg/dL or <   2 hours after start of each meal: 120 mg/dL or <    Date to report blood sugars: one week from when she gets started with testing  If she receives her meter later today or tomorrow then she is to start testing 4 times per day, fasting and 2 hours after each meal for 1 week Tuesday, until Tuesday, 10/6, and call blood sugars in to voicemail [phone] with name  and values for the week to be reviewed and to determine diagnosis   Values to be reviewed by patients OB as well as perinatologist and will be contacted within 24 hours if have diagnosis and new referall to be placed for patient to complete class 1 and class 2 education following diagnosis  Follow up date: Tuesday, 10/6    Begin time:2:30 pm  End Time:2:30 pm    Janis Christie RD, MARIEN  Diabetes Educator  [facility]  Diabetes in Pregnancy Program    Virtual Regular Visit      Assessment/Plan:    Problem List Items Addressed This Visit        Other    Abnormal glucola      Other Visit Diagnoses     Abnormal glucose tolerance test during pregnancy, antepartum    -  Primary    27 weeks gestation of pregnancy                   Reason for visit is   Chief Complaint   Patient presents with    Virtual Regular Visit        Encounter provider Janis Christie    Provider located at [address] [place] [id number] [phone]      Recent Visits  No visits were found meeting these conditions  Showing recent visits within past 7 days and meeting all other requirements     Future Appointments  No visits were found meeting these conditions  Showing future appointments within next 150 days and meeting all other requirements        The patient was identified by name and date of birth  Larry Palafox was informed that this is a telemedicine visit and that the visit is being conducted through [employer]  My office door was closed  The patient was notified the following individuals were present in the room Emily Irene MD OB resident  She acknowledged consent and understanding of privacy and security of the video platform  The patient has agreed to participate and understands they can discontinue the visit at any time  Patient is aware this is a billable service  Subjective  Larry Palafox is a 31 y o  female          HPI     Past Medical History:   Diagnosis Date    Chlamydia        Past Surgical History:   Procedure Laterality Date     SECTION  , ,        Current Outpatient Medications   Medication Sig Dispense Refill    Blood Glucose Monitoring Suppl (OneTouch Verio Flex System) w/Device KIT Dispense 1 kit per insurance formulary  1 kit 0    ferrous sulfate 324 (65 Fe) mg Take 1 tablet (324 mg total) by mouth 2 (two) times a day before meals 90 tablet 3    HYDROXYprogesterone Caproate (Hadley) 275 MG/1 1ML injection Inject 275 mg under the skin every 7 days Begin treatment between 16 weeks, 0 days and 20 weeks, 6 days of gestation  Continue administration once weekly until week 37 (through 36 weeks, 6 days) of gestation or delivery, whichever occurs first       nitrofurantoin (MACROBID) 100 mg capsule Take 1 capsule (100 mg total) by mouth daily 30 capsule 5    OneTouch Delica Lancets 42I MISC Use 4 a day or as directed  100 each 0    OneTouch Verio test strip Test 4 times a day and as instructed  Rule out Gestational diabetes  50 each 0    Prenatal Vit-Fe Fumarate-FA (PRENATAL VITAMIN) 27-0 8 MG TABS Take 1 tablet by mouth daily 90 tablet 4     Current Facility-Administered Medications   Medication Dose Route Frequency Provider Last Rate Last Dose    HYDROXYprogesterone Caproate (YOHAN) injection 275 mg  275 mg Subcutaneous Q7 Days James Pleasure, CRNP   275 mg at 09/17/20 1157        Allergies   Allergen Reactions    Meperidine Rash       Review of Systems    Video Exam    There were no vitals filed for this visit  Physical Exam     I spent 30 minutes directly with the patient during this visit      VIRTUAL VISIT DISCLAIMER    Oscar Cook acknowledges that she has consented to an online visit or consultation  She understands that the online visit is based solely on information provided by her, and that, in the absence of a face-to-face physical evaluation by the physician, the diagnosis she receives is both limited and provisional in terms of accuracy and completeness  This is not intended to replace a full medical face-to-face evaluation by the physician  Oscar Cook understands and accepts these terms

## 2020-09-29 ENCOUNTER — TELEPHONE (OUTPATIENT)
Dept: PERINATAL CARE | Facility: CLINIC | Age: 30
End: 2020-09-29

## 2020-09-29 NOTE — TELEPHONE ENCOUNTER
Patient received one touch ultra mini glucometer from pharmacy verses one touch verio flex meter that was ordered  Called [place] pharmacy and spoke with pharmacist  Per pharmacist, one touch verio flex meter on back order and per patients insurance one touch ultra mini preferred replacement of meter   Pharmacist explained over the telephone that she reviewed use of meter via telephone, and patient was able to successfully complete test over the phone per pharmacist

## 2020-09-30 ENCOUNTER — TELEPHONE (OUTPATIENT)
Dept: OBGYN CLINIC | Facility: CLINIC | Age: 30
End: 2020-09-30

## 2020-10-01 ENCOUNTER — ROUTINE PRENATAL (OUTPATIENT)
Dept: OBGYN CLINIC | Facility: CLINIC | Age: 30
End: 2020-10-01

## 2020-10-01 VITALS
TEMPERATURE: 97.8 F | HEART RATE: 85 BPM | WEIGHT: 166 LBS | DIASTOLIC BLOOD PRESSURE: 68 MMHG | SYSTOLIC BLOOD PRESSURE: 115 MMHG | BODY MASS INDEX: 31.37 KG/M2

## 2020-10-01 DIAGNOSIS — Z3A.28 28 WEEKS GESTATION OF PREGNANCY: Primary | ICD-10-CM

## 2020-10-01 DIAGNOSIS — O09.899 HISTORY OF PRETERM DELIVERY, CURRENTLY PREGNANT: ICD-10-CM

## 2020-10-01 DIAGNOSIS — Z23 IMMUNIZATION DUE: ICD-10-CM

## 2020-10-01 DIAGNOSIS — O99.810 ABNORMAL GLUCOSE AFFECTING PREGNANCY: ICD-10-CM

## 2020-10-01 PROCEDURE — 96372 THER/PROPH/DIAG INJ SC/IM: CPT | Performed by: OBSTETRICS & GYNECOLOGY

## 2020-10-01 PROCEDURE — 90471 IMMUNIZATION ADMIN: CPT | Performed by: OBSTETRICS & GYNECOLOGY

## 2020-10-01 PROCEDURE — 99213 OFFICE O/P EST LOW 20 MIN: CPT | Performed by: OBSTETRICS & GYNECOLOGY

## 2020-10-01 PROCEDURE — 90715 TDAP VACCINE 7 YRS/> IM: CPT | Performed by: OBSTETRICS & GYNECOLOGY

## 2020-10-07 ENCOUNTER — TELEPHONE (OUTPATIENT)
Dept: OBGYN CLINIC | Facility: CLINIC | Age: 30
End: 2020-10-07

## 2020-10-08 ENCOUNTER — CLINICAL SUPPORT (OUTPATIENT)
Dept: OBGYN CLINIC | Facility: CLINIC | Age: 30
End: 2020-10-08

## 2020-10-08 VITALS
SYSTOLIC BLOOD PRESSURE: 121 MMHG | HEIGHT: 60 IN | WEIGHT: 166.2 LBS | DIASTOLIC BLOOD PRESSURE: 80 MMHG | TEMPERATURE: 98.4 F | BODY MASS INDEX: 32.63 KG/M2 | HEART RATE: 71 BPM

## 2020-10-08 DIAGNOSIS — O09.899 HISTORY OF PRETERM DELIVERY, CURRENTLY PREGNANT: ICD-10-CM

## 2020-10-08 PROCEDURE — 96372 THER/PROPH/DIAG INJ SC/IM: CPT

## 2020-10-08 PROCEDURE — 3008F BODY MASS INDEX DOCD: CPT | Performed by: OBSTETRICS & GYNECOLOGY

## 2020-10-13 ENCOUNTER — HOSPITAL ENCOUNTER (OUTPATIENT)
Facility: HOSPITAL | Age: 30
Discharge: HOME | End: 2020-10-13
Attending: OBSTETRICS & GYNECOLOGY | Admitting: OBSTETRICS & GYNECOLOGY
Payer: COMMERCIAL

## 2020-10-13 ENCOUNTER — TELEPHONE (OUTPATIENT)
Dept: OBGYN CLINIC | Facility: CLINIC | Age: 30
End: 2020-10-13

## 2020-10-13 VITALS
RESPIRATION RATE: 16 BRPM | DIASTOLIC BLOOD PRESSURE: 59 MMHG | TEMPERATURE: 97.6 F | SYSTOLIC BLOOD PRESSURE: 120 MMHG | HEART RATE: 83 BPM

## 2020-10-13 DIAGNOSIS — Z3A.27 27 WEEKS GESTATION OF PREGNANCY: ICD-10-CM

## 2020-10-13 DIAGNOSIS — O99.810 ABNORMAL GLUCOSE TOLERANCE TEST DURING PREGNANCY, ANTEPARTUM: ICD-10-CM

## 2020-10-13 LAB
BACTERIA UR QL AUTO: ABNORMAL /HPF
BILIRUB UR QL STRIP: NEGATIVE
CLARITY UR: ABNORMAL
COLOR UR: YELLOW
GLUCOSE UR STRIP-MCNC: NEGATIVE MG/DL
HGB UR QL STRIP.AUTO: ABNORMAL
KETONES UR STRIP-MCNC: NEGATIVE MG/DL
LEUKOCYTE ESTERASE UR QL STRIP: ABNORMAL
NITRITE UR QL STRIP: POSITIVE
NON-SQ EPI CELLS URNS QL MICRO: ABNORMAL /HPF
PH UR STRIP.AUTO: 6.5 [PH]
PROT UR STRIP-MCNC: NEGATIVE MG/DL
RBC #/AREA URNS AUTO: ABNORMAL /HPF
SP GR UR STRIP.AUTO: 1.02 (ref 1–1.03)
UROBILINOGEN UR QL STRIP.AUTO: 0.2 E.U./DL
WBC #/AREA URNS AUTO: ABNORMAL /HPF

## 2020-10-13 PROCEDURE — 76815 OB US LIMITED FETUS(S): CPT | Performed by: OBSTETRICS & GYNECOLOGY

## 2020-10-13 PROCEDURE — 87086 URINE CULTURE/COLONY COUNT: CPT | Performed by: OBSTETRICS & GYNECOLOGY

## 2020-10-13 PROCEDURE — NC001 PR NO CHARGE: Performed by: OBSTETRICS & GYNECOLOGY

## 2020-10-13 PROCEDURE — 76817 TRANSVAGINAL US OBSTETRIC: CPT | Performed by: OBSTETRICS & GYNECOLOGY

## 2020-10-13 PROCEDURE — 81001 URINALYSIS AUTO W/SCOPE: CPT | Performed by: OBSTETRICS & GYNECOLOGY

## 2020-10-13 PROCEDURE — 99213 OFFICE O/P EST LOW 20 MIN: CPT

## 2020-10-13 RX ORDER — BLOOD SUGAR DIAGNOSTIC
STRIP MISCELLANEOUS
Qty: 50 EACH | Refills: 0 | Status: SHIPPED | OUTPATIENT
Start: 2020-10-13 | End: 2020-12-17 | Stop reason: HOSPADM

## 2020-10-14 ENCOUNTER — TELEPHONE (OUTPATIENT)
Dept: OBGYN CLINIC | Facility: CLINIC | Age: 30
End: 2020-10-14

## 2020-10-14 LAB — BACTERIA UR CULT: NORMAL

## 2020-10-15 ENCOUNTER — ROUTINE PRENATAL (OUTPATIENT)
Dept: OBGYN CLINIC | Facility: CLINIC | Age: 30
End: 2020-10-15

## 2020-10-15 VITALS
DIASTOLIC BLOOD PRESSURE: 70 MMHG | BODY MASS INDEX: 32.03 KG/M2 | HEART RATE: 74 BPM | TEMPERATURE: 97.6 F | SYSTOLIC BLOOD PRESSURE: 115 MMHG | WEIGHT: 164 LBS

## 2020-10-15 DIAGNOSIS — O23.00 PYELONEPHRITIS DURING PREGNANCY: ICD-10-CM

## 2020-10-15 DIAGNOSIS — O99.012 ANEMIA DURING PREGNANCY IN SECOND TRIMESTER: ICD-10-CM

## 2020-10-15 DIAGNOSIS — Z30.8 ENCOUNTER FOR OTHER CONTRACEPTIVE MANAGEMENT: ICD-10-CM

## 2020-10-15 DIAGNOSIS — O34.219 PREVIOUS CESAREAN DELIVERY, ANTEPARTUM: Primary | ICD-10-CM

## 2020-10-15 DIAGNOSIS — Z34.93 PRENATAL CARE IN THIRD TRIMESTER: ICD-10-CM

## 2020-10-15 DIAGNOSIS — O24.419 GESTATIONAL DIABETES MELLITUS (GDM) IN THIRD TRIMESTER, GESTATIONAL DIABETES METHOD OF CONTROL UNSPECIFIED: ICD-10-CM

## 2020-10-15 DIAGNOSIS — O09.899 HISTORY OF PRETERM DELIVERY, CURRENTLY PREGNANT: ICD-10-CM

## 2020-10-15 DIAGNOSIS — O09.299 PRIOR PREGNANCY COMPLICATED BY IUGR, ANTEPARTUM: ICD-10-CM

## 2020-10-15 DIAGNOSIS — O40.3XX0 POLYHYDRAMNIOS AFFECTING PREGNANCY IN THIRD TRIMESTER: ICD-10-CM

## 2020-10-15 PROCEDURE — 99214 OFFICE O/P EST MOD 30 MIN: CPT | Performed by: OBSTETRICS & GYNECOLOGY

## 2020-10-15 PROCEDURE — 90472 IMMUNIZATION ADMIN EACH ADD: CPT | Performed by: OBSTETRICS & GYNECOLOGY

## 2020-10-15 PROCEDURE — 90471 IMMUNIZATION ADMIN: CPT | Performed by: OBSTETRICS & GYNECOLOGY

## 2020-10-15 PROCEDURE — 90686 IIV4 VACC NO PRSV 0.5 ML IM: CPT | Performed by: OBSTETRICS & GYNECOLOGY

## 2020-10-19 ENCOUNTER — APPOINTMENT (OUTPATIENT)
Dept: LAB | Facility: CLINIC | Age: 30
End: 2020-10-19
Payer: COMMERCIAL

## 2020-10-19 ENCOUNTER — TELEPHONE (OUTPATIENT)
Dept: OBGYN CLINIC | Facility: CLINIC | Age: 30
End: 2020-10-19

## 2020-10-19 DIAGNOSIS — Z3A.28 28 WEEKS GESTATION OF PREGNANCY: ICD-10-CM

## 2020-10-19 LAB
ERYTHROCYTE [DISTWIDTH] IN BLOOD BY AUTOMATED COUNT: 13.9 % (ref 11.6–15.1)
HCT VFR BLD AUTO: 37.4 % (ref 34.8–46.1)
HGB BLD-MCNC: 12.2 G/DL (ref 11.5–15.4)
MCH RBC QN AUTO: 29.5 PG (ref 26.8–34.3)
MCHC RBC AUTO-ENTMCNC: 32.6 G/DL (ref 31.4–37.4)
MCV RBC AUTO: 91 FL (ref 82–98)
PLATELET # BLD AUTO: 202 THOUSANDS/UL (ref 149–390)
PMV BLD AUTO: 11.4 FL (ref 8.9–12.7)
RBC # BLD AUTO: 4.13 MILLION/UL (ref 3.81–5.12)
WBC # BLD AUTO: 9.47 THOUSAND/UL (ref 4.31–10.16)

## 2020-10-19 PROCEDURE — 85027 COMPLETE CBC AUTOMATED: CPT

## 2020-10-19 PROCEDURE — 36415 COLL VENOUS BLD VENIPUNCTURE: CPT

## 2020-10-22 ENCOUNTER — CLINICAL SUPPORT (OUTPATIENT)
Dept: OBGYN CLINIC | Facility: CLINIC | Age: 30
End: 2020-10-22

## 2020-10-22 VITALS
TEMPERATURE: 97.9 F | SYSTOLIC BLOOD PRESSURE: 123 MMHG | WEIGHT: 170 LBS | HEART RATE: 75 BPM | BODY MASS INDEX: 33.2 KG/M2 | DIASTOLIC BLOOD PRESSURE: 67 MMHG

## 2020-10-22 DIAGNOSIS — O09.899 HISTORY OF PRETERM DELIVERY, CURRENTLY PREGNANT: ICD-10-CM

## 2020-10-22 PROBLEM — O40.9XX0 POLYHYDRAMNIOS AFFECTING PREGNANCY: Status: ACTIVE | Noted: 2020-10-15

## 2020-10-22 PROBLEM — O99.810 ABNORMAL GLUCOSE AFFECTING PREGNANCY: Status: RESOLVED | Noted: 2020-09-10 | Resolved: 2020-10-22

## 2020-10-22 PROCEDURE — 96372 THER/PROPH/DIAG INJ SC/IM: CPT

## 2020-10-23 ENCOUNTER — TELEPHONE (OUTPATIENT)
Dept: OBGYN CLINIC | Facility: CLINIC | Age: 30
End: 2020-10-23

## 2020-10-23 ENCOUNTER — DOCUMENTATION (OUTPATIENT)
Dept: PERINATAL CARE | Facility: CLINIC | Age: 30
End: 2020-10-23

## 2020-10-23 ENCOUNTER — TELEMEDICINE (OUTPATIENT)
Dept: PERINATAL CARE | Facility: CLINIC | Age: 30
End: 2020-10-23
Payer: COMMERCIAL

## 2020-10-23 DIAGNOSIS — O99.213 OBESITY AFFECTING PREGNANCY IN THIRD TRIMESTER, ANTEPARTUM: ICD-10-CM

## 2020-10-23 DIAGNOSIS — O40.9XX0 POLYHYDRAMNIOS AFFECTING PREGNANCY: ICD-10-CM

## 2020-10-23 DIAGNOSIS — Z3A.31 31 WEEKS GESTATION OF PREGNANCY: ICD-10-CM

## 2020-10-23 DIAGNOSIS — O24.419 GESTATIONAL DIABETES MELLITUS (GDM) IN THIRD TRIMESTER, GESTATIONAL DIABETES METHOD OF CONTROL UNSPECIFIED: Primary | ICD-10-CM

## 2020-10-23 PROCEDURE — G0108 DIAB MANAGE TRN  PER INDIV: HCPCS

## 2020-10-26 ENCOUNTER — TELEPHONE (OUTPATIENT)
Dept: PERINATAL CARE | Facility: CLINIC | Age: 30
End: 2020-10-26

## 2020-10-28 ENCOUNTER — TELEPHONE (OUTPATIENT)
Dept: OBGYN CLINIC | Facility: CLINIC | Age: 30
End: 2020-10-28

## 2020-10-29 ENCOUNTER — APPOINTMENT (OUTPATIENT)
Dept: LAB | Facility: CLINIC | Age: 30
End: 2020-10-29
Payer: COMMERCIAL

## 2020-10-29 ENCOUNTER — ROUTINE PRENATAL (OUTPATIENT)
Dept: OBGYN CLINIC | Facility: CLINIC | Age: 30
End: 2020-10-29

## 2020-10-29 VITALS
BODY MASS INDEX: 32.61 KG/M2 | WEIGHT: 167 LBS | SYSTOLIC BLOOD PRESSURE: 120 MMHG | TEMPERATURE: 97.8 F | DIASTOLIC BLOOD PRESSURE: 71 MMHG | HEART RATE: 80 BPM

## 2020-10-29 DIAGNOSIS — Z3A.25 25 WEEKS GESTATION OF PREGNANCY: ICD-10-CM

## 2020-10-29 DIAGNOSIS — Z34.93 PRENATAL CARE IN THIRD TRIMESTER: ICD-10-CM

## 2020-10-29 DIAGNOSIS — O99.013 ANEMIA DURING PREGNANCY IN THIRD TRIMESTER: ICD-10-CM

## 2020-10-29 DIAGNOSIS — O24.419 GESTATIONAL DIABETES MELLITUS (GDM) IN THIRD TRIMESTER, GESTATIONAL DIABETES METHOD OF CONTROL UNSPECIFIED: ICD-10-CM

## 2020-10-29 DIAGNOSIS — O99.210 OBESITY IN PREGNANCY: ICD-10-CM

## 2020-10-29 DIAGNOSIS — Z3A.32 32 WEEKS GESTATION OF PREGNANCY: ICD-10-CM

## 2020-10-29 DIAGNOSIS — Z34.93 PRENATAL CARE IN THIRD TRIMESTER: Primary | ICD-10-CM

## 2020-10-29 DIAGNOSIS — O09.899 HISTORY OF PRETERM DELIVERY, CURRENTLY PREGNANT: ICD-10-CM

## 2020-10-29 LAB
ALBUMIN SERPL BCP-MCNC: 3 G/DL (ref 3.5–5)
ALP SERPL-CCNC: 100 U/L (ref 46–116)
ALT SERPL W P-5'-P-CCNC: 19 U/L (ref 12–78)
ANION GAP SERPL CALCULATED.3IONS-SCNC: 9 MMOL/L (ref 4–13)
AST SERPL W P-5'-P-CCNC: 16 U/L (ref 5–45)
BILIRUB SERPL-MCNC: 0.2 MG/DL (ref 0.2–1)
BUN SERPL-MCNC: 9 MG/DL (ref 5–25)
CALCIUM ALBUM COR SERPL-MCNC: 9.9 MG/DL (ref 8.3–10.1)
CALCIUM SERPL-MCNC: 9.1 MG/DL (ref 8.3–10.1)
CHLORIDE SERPL-SCNC: 108 MMOL/L (ref 100–108)
CO2 SERPL-SCNC: 21 MMOL/L (ref 21–32)
CREAT SERPL-MCNC: 0.54 MG/DL (ref 0.6–1.3)
GFR SERPL CREATININE-BSD FRML MDRD: 129 ML/MIN/1.73SQ M
GLUCOSE SERPL-MCNC: 77 MG/DL (ref 65–140)
POTASSIUM SERPL-SCNC: 3.8 MMOL/L (ref 3.5–5.3)
PROT SERPL-MCNC: 7.2 G/DL (ref 6.4–8.2)
SODIUM SERPL-SCNC: 138 MMOL/L (ref 136–145)

## 2020-10-29 PROCEDURE — 80053 COMPREHEN METABOLIC PANEL: CPT

## 2020-10-29 PROCEDURE — 83036 HEMOGLOBIN GLYCOSYLATED A1C: CPT

## 2020-10-29 PROCEDURE — 96372 THER/PROPH/DIAG INJ SC/IM: CPT | Performed by: NURSE PRACTITIONER

## 2020-10-29 PROCEDURE — 86592 SYPHILIS TEST NON-TREP QUAL: CPT

## 2020-10-29 PROCEDURE — 99215 OFFICE O/P EST HI 40 MIN: CPT | Performed by: NURSE PRACTITIONER

## 2020-10-29 PROCEDURE — 36415 COLL VENOUS BLD VENIPUNCTURE: CPT

## 2020-10-30 LAB
EST. AVERAGE GLUCOSE BLD GHB EST-MCNC: 94 MG/DL
HBA1C MFR BLD: 4.9 %
RPR SER QL: NORMAL

## 2020-11-02 ENCOUNTER — TELEPHONE (OUTPATIENT)
Dept: PERINATAL CARE | Facility: CLINIC | Age: 30
End: 2020-11-02

## 2020-11-02 ENCOUNTER — TELEPHONE (OUTPATIENT)
Dept: OBGYN CLINIC | Facility: CLINIC | Age: 30
End: 2020-11-02

## 2020-11-03 ENCOUNTER — DOCUMENTATION (OUTPATIENT)
Dept: PERINATAL CARE | Facility: CLINIC | Age: 30
End: 2020-11-03

## 2020-11-03 ENCOUNTER — TELEMEDICINE (OUTPATIENT)
Dept: PERINATAL CARE | Facility: CLINIC | Age: 30
End: 2020-11-03
Payer: COMMERCIAL

## 2020-11-03 ENCOUNTER — TELEPHONE (OUTPATIENT)
Dept: PERINATAL CARE | Facility: CLINIC | Age: 30
End: 2020-11-03

## 2020-11-03 VITALS — WEIGHT: 167 LBS | BODY MASS INDEX: 32.79 KG/M2 | HEIGHT: 60 IN

## 2020-11-03 DIAGNOSIS — O99.210 OBESITY IN PREGNANCY: ICD-10-CM

## 2020-11-03 DIAGNOSIS — O24.410 DIET CONTROLLED GESTATIONAL DIABETES MELLITUS (GDM) IN THIRD TRIMESTER: Primary | ICD-10-CM

## 2020-11-03 DIAGNOSIS — Z3A.33 33 WEEKS GESTATION OF PREGNANCY: ICD-10-CM

## 2020-11-03 DIAGNOSIS — O40.9XX0 POLYHYDRAMNIOS AFFECTING PREGNANCY: ICD-10-CM

## 2020-11-03 PROCEDURE — 99214 OFFICE O/P EST MOD 30 MIN: CPT | Performed by: NURSE PRACTITIONER

## 2020-11-04 ENCOUNTER — ULTRASOUND (OUTPATIENT)
Dept: PERINATAL CARE | Facility: OTHER | Age: 30
End: 2020-11-04
Payer: COMMERCIAL

## 2020-11-04 ENCOUNTER — CLINICAL SUPPORT (OUTPATIENT)
Dept: OBGYN CLINIC | Facility: CLINIC | Age: 30
End: 2020-11-04

## 2020-11-04 VITALS
WEIGHT: 169.2 LBS | HEART RATE: 62 BPM | SYSTOLIC BLOOD PRESSURE: 106 MMHG | BODY MASS INDEX: 31.95 KG/M2 | TEMPERATURE: 97.6 F | HEIGHT: 61 IN | DIASTOLIC BLOOD PRESSURE: 65 MMHG

## 2020-11-04 VITALS
HEART RATE: 66 BPM | SYSTOLIC BLOOD PRESSURE: 117 MMHG | BODY MASS INDEX: 33.02 KG/M2 | WEIGHT: 168.2 LBS | TEMPERATURE: 97 F | DIASTOLIC BLOOD PRESSURE: 71 MMHG | HEIGHT: 60 IN

## 2020-11-04 DIAGNOSIS — O09.899 HISTORY OF PRETERM DELIVERY, CURRENTLY PREGNANT: ICD-10-CM

## 2020-11-04 DIAGNOSIS — O40.3XX0 POLYHYDRAMNIOS AFFECTING PREGNANCY IN THIRD TRIMESTER: ICD-10-CM

## 2020-11-04 DIAGNOSIS — O24.410 DIET CONTROLLED GESTATIONAL DIABETES MELLITUS (GDM) IN THIRD TRIMESTER: ICD-10-CM

## 2020-11-04 DIAGNOSIS — Z3A.33 33 WEEKS GESTATION OF PREGNANCY: Primary | ICD-10-CM

## 2020-11-04 PROCEDURE — 76818 FETAL BIOPHYS PROFILE W/NST: CPT | Performed by: OBSTETRICS & GYNECOLOGY

## 2020-11-04 PROCEDURE — 96372 THER/PROPH/DIAG INJ SC/IM: CPT

## 2020-11-04 PROCEDURE — 76816 OB US FOLLOW-UP PER FETUS: CPT | Performed by: OBSTETRICS & GYNECOLOGY

## 2020-11-04 PROCEDURE — 99213 OFFICE O/P EST LOW 20 MIN: CPT | Performed by: OBSTETRICS & GYNECOLOGY

## 2020-11-05 ENCOUNTER — TELEPHONE (OUTPATIENT)
Dept: OBGYN CLINIC | Facility: CLINIC | Age: 30
End: 2020-11-05

## 2020-11-06 ENCOUNTER — HOSPITAL ENCOUNTER (OUTPATIENT)
Facility: HOSPITAL | Age: 30
Discharge: HOME | DRG: 566 | End: 2020-11-06
Attending: OBSTETRICS & GYNECOLOGY | Admitting: OBSTETRICS & GYNECOLOGY
Payer: COMMERCIAL

## 2020-11-06 ENCOUNTER — NURSE TRIAGE (OUTPATIENT)
Dept: OTHER | Facility: OTHER | Age: 30
End: 2020-11-06

## 2020-11-06 ENCOUNTER — HOSPITAL ENCOUNTER (INPATIENT)
Facility: HOSPITAL | Age: 30
LOS: 1 days | Discharge: HOME | DRG: 566 | End: 2020-11-07
Attending: OBSTETRICS & GYNECOLOGY | Admitting: OBSTETRICS & GYNECOLOGY
Payer: COMMERCIAL

## 2020-11-06 VITALS
TEMPERATURE: 98 F | OXYGEN SATURATION: 97 % | RESPIRATION RATE: 18 BRPM | DIASTOLIC BLOOD PRESSURE: 81 MMHG | HEART RATE: 74 BPM | SYSTOLIC BLOOD PRESSURE: 123 MMHG

## 2020-11-06 DIAGNOSIS — O47.03 PRETERM UTERINE CONTRACTIONS IN THIRD TRIMESTER, ANTEPARTUM: ICD-10-CM

## 2020-11-06 DIAGNOSIS — Z3A.33 33 WEEKS GESTATION OF PREGNANCY: Primary | ICD-10-CM

## 2020-11-06 LAB
BACTERIA UR QL AUTO: ABNORMAL /HPF
BILIRUB UR QL STRIP: ABNORMAL
CLARITY UR: ABNORMAL
COLOR UR: ABNORMAL
GLUCOSE UR STRIP-MCNC: NEGATIVE MG/DL
HGB UR QL STRIP.AUTO: ABNORMAL
KETONES UR STRIP-MCNC: ABNORMAL MG/DL
LEUKOCYTE ESTERASE UR QL STRIP: ABNORMAL
NITRITE UR QL STRIP: NEGATIVE
NON-SQ EPI CELLS URNS QL MICRO: ABNORMAL /HPF
PH UR STRIP.AUTO: 6.5 [PH]
PROT UR STRIP-MCNC: ABNORMAL MG/DL
RBC #/AREA URNS AUTO: ABNORMAL /HPF
SP GR UR STRIP.AUTO: 1.02 (ref 1–1.03)
UROBILINOGEN UR QL STRIP.AUTO: 0.2 E.U./DL
WBC #/AREA URNS AUTO: ABNORMAL /HPF

## 2020-11-06 PROCEDURE — 99214 OFFICE O/P EST MOD 30 MIN: CPT | Performed by: OBSTETRICS & GYNECOLOGY

## 2020-11-06 PROCEDURE — 87086 URINE CULTURE/COLONY COUNT: CPT | Performed by: STUDENT IN AN ORGANIZED HEALTH CARE EDUCATION/TRAINING PROGRAM

## 2020-11-06 PROCEDURE — 99214 OFFICE O/P EST MOD 30 MIN: CPT

## 2020-11-06 PROCEDURE — 81001 URINALYSIS AUTO W/SCOPE: CPT | Performed by: STUDENT IN AN ORGANIZED HEALTH CARE EDUCATION/TRAINING PROGRAM

## 2020-11-06 PROCEDURE — 4A1HXCZ MONITORING OF PRODUCTS OF CONCEPTION, CARDIAC RATE, EXTERNAL APPROACH: ICD-10-PCS | Performed by: OBSTETRICS & GYNECOLOGY

## 2020-11-06 PROCEDURE — G0379 DIRECT REFER HOSPITAL OBSERV: HCPCS

## 2020-11-06 RX ORDER — BETAMETHASONE SODIUM PHOSPHATE AND BETAMETHASONE ACETATE 3; 3 MG/ML; MG/ML
12 INJECTION, SUSPENSION INTRA-ARTICULAR; INTRALESIONAL; INTRAMUSCULAR; SOFT TISSUE EVERY 24 HOURS
Status: DISCONTINUED | OUTPATIENT
Start: 2020-11-06 | End: 2020-11-06 | Stop reason: HOSPADM

## 2020-11-06 RX ADMIN — BETAMETHASONE SODIUM PHOSPHATE AND BETAMETHASONE ACETATE 12 MG: 3; 3 INJECTION, SUSPENSION INTRA-ARTICULAR; INTRALESIONAL; INTRAMUSCULAR at 17:06

## 2020-11-07 VITALS
DIASTOLIC BLOOD PRESSURE: 62 MMHG | OXYGEN SATURATION: 98 % | RESPIRATION RATE: 20 BRPM | WEIGHT: 170 LBS | BODY MASS INDEX: 32.1 KG/M2 | SYSTOLIC BLOOD PRESSURE: 113 MMHG | TEMPERATURE: 98.3 F | HEART RATE: 80 BPM | HEIGHT: 61 IN

## 2020-11-07 PROBLEM — O47.03 PRETERM UTERINE CONTRACTIONS IN THIRD TRIMESTER, ANTEPARTUM: Status: ACTIVE | Noted: 2020-11-07

## 2020-11-07 PROBLEM — O60.03 PRETERM LABOR IN THIRD TRIMESTER: Status: ACTIVE | Noted: 2020-11-07

## 2020-11-07 LAB
ABO GROUP BLD: NORMAL
BACTERIA UR CULT: NORMAL
BLD GP AB SCN SERPL QL: NEGATIVE
ERYTHROCYTE [DISTWIDTH] IN BLOOD BY AUTOMATED COUNT: 13.3 % (ref 11.6–15.1)
HCT VFR BLD AUTO: 35.4 % (ref 34.8–46.1)
HGB BLD-MCNC: 11.7 G/DL (ref 11.5–15.4)
MCH RBC QN AUTO: 29 PG (ref 26.8–34.3)
MCHC RBC AUTO-ENTMCNC: 33.1 G/DL (ref 31.4–37.4)
MCV RBC AUTO: 88 FL (ref 82–98)
PLATELET # BLD AUTO: 147 THOUSANDS/UL (ref 149–390)
PMV BLD AUTO: 11.8 FL (ref 8.9–12.7)
RBC # BLD AUTO: 4.03 MILLION/UL (ref 3.81–5.12)
RH BLD: POSITIVE
SPECIMEN EXPIRATION DATE: NORMAL
WBC # BLD AUTO: 12.17 THOUSAND/UL (ref 4.31–10.16)

## 2020-11-07 PROCEDURE — 87653 STREP B DNA AMP PROBE: CPT | Performed by: OBSTETRICS & GYNECOLOGY

## 2020-11-07 PROCEDURE — 86850 RBC ANTIBODY SCREEN: CPT | Performed by: OBSTETRICS & GYNECOLOGY

## 2020-11-07 PROCEDURE — 99231 SBSQ HOSP IP/OBS SF/LOW 25: CPT | Performed by: OBSTETRICS & GYNECOLOGY

## 2020-11-07 PROCEDURE — 86592 SYPHILIS TEST NON-TREP QUAL: CPT | Performed by: OBSTETRICS & GYNECOLOGY

## 2020-11-07 PROCEDURE — 85027 COMPLETE CBC AUTOMATED: CPT | Performed by: OBSTETRICS & GYNECOLOGY

## 2020-11-07 PROCEDURE — NC001 PR NO CHARGE: Performed by: OBSTETRICS & GYNECOLOGY

## 2020-11-07 PROCEDURE — 59025 FETAL NON-STRESS TEST: CPT | Performed by: OBSTETRICS & GYNECOLOGY

## 2020-11-07 PROCEDURE — 99252 IP/OBS CONSLTJ NEW/EST SF 35: CPT | Performed by: OBSTETRICS & GYNECOLOGY

## 2020-11-07 PROCEDURE — 86900 BLOOD TYPING SEROLOGIC ABO: CPT | Performed by: OBSTETRICS & GYNECOLOGY

## 2020-11-07 PROCEDURE — 99202 OFFICE O/P NEW SF 15 MIN: CPT

## 2020-11-07 PROCEDURE — 86901 BLOOD TYPING SEROLOGIC RH(D): CPT | Performed by: OBSTETRICS & GYNECOLOGY

## 2020-11-07 PROCEDURE — 76817 TRANSVAGINAL US OBSTETRIC: CPT | Performed by: OBSTETRICS & GYNECOLOGY

## 2020-11-07 RX ORDER — NIFEDIPINE 10 MG/1
20 CAPSULE ORAL ONCE
Status: COMPLETED | OUTPATIENT
Start: 2020-11-07 | End: 2020-11-07

## 2020-11-07 RX ORDER — BETAMETHASONE SODIUM PHOSPHATE AND BETAMETHASONE ACETATE 3; 3 MG/ML; MG/ML
12 INJECTION, SUSPENSION INTRA-ARTICULAR; INTRALESIONAL; INTRAMUSCULAR; SOFT TISSUE ONCE
Status: COMPLETED | OUTPATIENT
Start: 2020-11-07 | End: 2020-11-07

## 2020-11-07 RX ORDER — ACETAMINOPHEN 325 MG/1
650 TABLET ORAL EVERY 4 HOURS PRN
Status: DISCONTINUED | OUTPATIENT
Start: 2020-11-07 | End: 2020-11-07 | Stop reason: HOSPADM

## 2020-11-07 RX ORDER — SODIUM CHLORIDE 9 MG/ML
125 INJECTION, SOLUTION INTRAVENOUS CONTINUOUS
Status: DISCONTINUED | OUTPATIENT
Start: 2020-11-07 | End: 2020-11-07 | Stop reason: HOSPADM

## 2020-11-07 RX ORDER — NITROFURANTOIN 25; 75 MG/1; MG/1
100 CAPSULE ORAL DAILY
Status: DISCONTINUED | OUTPATIENT
Start: 2020-11-07 | End: 2020-11-07 | Stop reason: HOSPADM

## 2020-11-07 RX ORDER — ONDANSETRON 2 MG/ML
4 INJECTION INTRAMUSCULAR; INTRAVENOUS EVERY 8 HOURS PRN
Status: DISCONTINUED | OUTPATIENT
Start: 2020-11-07 | End: 2020-11-07 | Stop reason: HOSPADM

## 2020-11-07 RX ORDER — NIFEDIPINE 10 MG/1
10 CAPSULE ORAL EVERY 6 HOURS
Status: DISCONTINUED | OUTPATIENT
Start: 2020-11-07 | End: 2020-11-07

## 2020-11-07 RX ORDER — ACETAMINOPHEN 325 MG/1
650 TABLET ORAL EVERY 4 HOURS PRN
Refills: 0
Start: 2020-11-07 | End: 2020-11-09

## 2020-11-07 RX ORDER — NIFEDIPINE 10 MG/1
10 CAPSULE ORAL EVERY 6 HOURS
Status: DISCONTINUED | OUTPATIENT
Start: 2020-11-07 | End: 2020-11-07 | Stop reason: HOSPADM

## 2020-11-07 RX ADMIN — NIFEDIPINE 20 MG: 10 CAPSULE ORAL at 02:14

## 2020-11-07 RX ADMIN — SODIUM CHLORIDE 125 ML/HR: 0.9 INJECTION, SOLUTION INTRAVENOUS at 01:27

## 2020-11-07 RX ADMIN — NITROFURANTOIN (MONOHYDRATE/MACROCRYSTALS) 100 MG: 75; 25 CAPSULE ORAL at 08:51

## 2020-11-07 RX ADMIN — ACETAMINOPHEN 650 MG: 325 TABLET ORAL at 09:17

## 2020-11-07 RX ADMIN — SODIUM CHLORIDE 2.5 MILLION UNITS: 9 INJECTION, SOLUTION INTRAVENOUS at 06:20

## 2020-11-07 RX ADMIN — SODIUM CHLORIDE 125 ML/HR: 0.9 INJECTION, SOLUTION INTRAVENOUS at 13:27

## 2020-11-07 RX ADMIN — BETAMETHASONE SODIUM PHOSPHATE AND BETAMETHASONE ACETATE 12 MG: 3; 3 INJECTION, SUSPENSION INTRA-ARTICULAR; INTRALESIONAL; INTRAMUSCULAR at 16:25

## 2020-11-07 RX ADMIN — NIFEDIPINE 10 MG: 10 CAPSULE ORAL at 08:51

## 2020-11-07 RX ADMIN — NIFEDIPINE 10 MG: 10 CAPSULE ORAL at 14:56

## 2020-11-07 RX ADMIN — SODIUM CHLORIDE 5 MILLION UNITS: 0.9 INJECTION, SOLUTION INTRAVENOUS at 01:27

## 2020-11-07 RX ADMIN — PRENATAL VIT W/ FE FUMARATE-FA TAB 27-0.8 MG 1 TABLET: 27-0.8 TAB at 08:51

## 2020-11-07 RX ADMIN — SODIUM CHLORIDE 2.5 MILLION UNITS: 9 INJECTION, SOLUTION INTRAVENOUS at 14:29

## 2020-11-07 RX ADMIN — ACETAMINOPHEN 650 MG: 325 TABLET ORAL at 02:14

## 2020-11-07 RX ADMIN — SODIUM CHLORIDE 2.5 MILLION UNITS: 9 INJECTION, SOLUTION INTRAVENOUS at 10:38

## 2020-11-09 ENCOUNTER — ROUTINE PRENATAL (OUTPATIENT)
Dept: OBGYN CLINIC | Facility: CLINIC | Age: 30
End: 2020-11-09

## 2020-11-09 VITALS
SYSTOLIC BLOOD PRESSURE: 116 MMHG | TEMPERATURE: 97.6 F | BODY MASS INDEX: 32.16 KG/M2 | WEIGHT: 170.2 LBS | DIASTOLIC BLOOD PRESSURE: 73 MMHG | HEART RATE: 90 BPM

## 2020-11-09 DIAGNOSIS — O40.3XX0 POLYHYDRAMNIOS IN THIRD TRIMESTER COMPLICATION, SINGLE OR UNSPECIFIED FETUS: ICD-10-CM

## 2020-11-09 DIAGNOSIS — O26.893 HEARTBURN DURING PREGNANCY IN THIRD TRIMESTER: ICD-10-CM

## 2020-11-09 DIAGNOSIS — R12 HEARTBURN DURING PREGNANCY IN THIRD TRIMESTER: ICD-10-CM

## 2020-11-09 DIAGNOSIS — Z34.93 PRENATAL CARE IN THIRD TRIMESTER: Primary | ICD-10-CM

## 2020-11-09 DIAGNOSIS — Z3A.33 33 WEEKS GESTATION OF PREGNANCY: ICD-10-CM

## 2020-11-09 LAB
GP B STREP DNA SPEC QL NAA+PROBE: NORMAL
RPR SER QL: NORMAL

## 2020-11-09 PROCEDURE — 99215 OFFICE O/P EST HI 40 MIN: CPT | Performed by: NURSE PRACTITIONER

## 2020-11-09 PROCEDURE — 59025 FETAL NON-STRESS TEST: CPT | Performed by: NURSE PRACTITIONER

## 2020-11-09 RX ORDER — FAMOTIDINE 20 MG/1
20 TABLET, FILM COATED ORAL 2 TIMES DAILY
Qty: 60 TABLET | Refills: 3 | Status: SHIPPED | OUTPATIENT
Start: 2020-11-09

## 2020-11-11 ENCOUNTER — TELEPHONE (OUTPATIENT)
Dept: OBGYN CLINIC | Facility: CLINIC | Age: 30
End: 2020-11-11

## 2020-11-11 DIAGNOSIS — Z71.89 COMPLEX CARE COORDINATION: Primary | ICD-10-CM

## 2020-11-12 ENCOUNTER — ROUTINE PRENATAL (OUTPATIENT)
Dept: OBGYN CLINIC | Facility: CLINIC | Age: 30
End: 2020-11-12

## 2020-11-12 VITALS
BODY MASS INDEX: 32.8 KG/M2 | SYSTOLIC BLOOD PRESSURE: 131 MMHG | DIASTOLIC BLOOD PRESSURE: 76 MMHG | HEART RATE: 83 BPM | WEIGHT: 173.6 LBS | TEMPERATURE: 98.2 F

## 2020-11-12 DIAGNOSIS — Z3A.34 34 WEEKS GESTATION OF PREGNANCY: ICD-10-CM

## 2020-11-12 DIAGNOSIS — O99.013 ANEMIA DURING PREGNANCY IN THIRD TRIMESTER: ICD-10-CM

## 2020-11-12 DIAGNOSIS — O99.210 OBESITY IN PREGNANCY: ICD-10-CM

## 2020-11-12 DIAGNOSIS — O09.899 HISTORY OF PRETERM DELIVERY, CURRENTLY PREGNANT: ICD-10-CM

## 2020-11-12 DIAGNOSIS — O24.419 GESTATIONAL DIABETES MELLITUS (GDM) IN THIRD TRIMESTER, GESTATIONAL DIABETES METHOD OF CONTROL UNSPECIFIED: ICD-10-CM

## 2020-11-12 DIAGNOSIS — Z34.93 PRENATAL CARE IN THIRD TRIMESTER: Primary | ICD-10-CM

## 2020-11-12 PROCEDURE — 99215 OFFICE O/P EST HI 40 MIN: CPT | Performed by: NURSE PRACTITIONER

## 2020-11-16 ENCOUNTER — ROUTINE PRENATAL (OUTPATIENT)
Dept: OBGYN CLINIC | Facility: CLINIC | Age: 30
End: 2020-11-16

## 2020-11-16 ENCOUNTER — PATIENT OUTREACH (OUTPATIENT)
Dept: OBGYN CLINIC | Facility: CLINIC | Age: 30
End: 2020-11-16

## 2020-11-16 VITALS
HEART RATE: 67 BPM | WEIGHT: 168.7 LBS | BODY MASS INDEX: 31.85 KG/M2 | DIASTOLIC BLOOD PRESSURE: 65 MMHG | HEIGHT: 61 IN | TEMPERATURE: 97 F | SYSTOLIC BLOOD PRESSURE: 119 MMHG

## 2020-11-16 DIAGNOSIS — B37.3 VAGINA, CANDIDIASIS: Primary | ICD-10-CM

## 2020-11-16 PROCEDURE — 3008F BODY MASS INDEX DOCD: CPT | Performed by: NURSE PRACTITIONER

## 2020-11-16 PROCEDURE — 99213 OFFICE O/P EST LOW 20 MIN: CPT | Performed by: NURSE PRACTITIONER

## 2020-11-16 RX ORDER — FLUCONAZOLE 150 MG/1
150 TABLET ORAL ONCE
Qty: 1 TABLET | Refills: 0 | Status: SHIPPED | OUTPATIENT
Start: 2020-11-16 | End: 2020-11-16

## 2020-11-19 ENCOUNTER — DOCUMENTATION (OUTPATIENT)
Dept: PERINATAL CARE | Facility: CLINIC | Age: 30
End: 2020-11-19

## 2020-11-24 ENCOUNTER — DOCUMENTATION (OUTPATIENT)
Dept: PERINATAL CARE | Facility: CLINIC | Age: 30
End: 2020-11-24

## 2020-11-24 ENCOUNTER — TELEPHONE (OUTPATIENT)
Dept: OBGYN CLINIC | Facility: CLINIC | Age: 30
End: 2020-11-24

## 2020-11-24 ENCOUNTER — TELEMEDICINE (OUTPATIENT)
Dept: PERINATAL CARE | Facility: CLINIC | Age: 30
End: 2020-11-24
Payer: COMMERCIAL

## 2020-11-24 DIAGNOSIS — O99.210 OBESITY IN PREGNANCY: ICD-10-CM

## 2020-11-24 DIAGNOSIS — Z3A.36 36 WEEKS GESTATION OF PREGNANCY: ICD-10-CM

## 2020-11-24 DIAGNOSIS — O24.410 DIET CONTROLLED GESTATIONAL DIABETES MELLITUS (GDM) IN THIRD TRIMESTER: Primary | ICD-10-CM

## 2020-11-24 PROCEDURE — G0108 DIAB MANAGE TRN  PER INDIV: HCPCS | Performed by: DIETITIAN, REGISTERED

## 2020-11-25 ENCOUNTER — ROUTINE PRENATAL (OUTPATIENT)
Dept: OBGYN CLINIC | Facility: CLINIC | Age: 30
End: 2020-11-25

## 2020-11-25 ENCOUNTER — PATIENT OUTREACH (OUTPATIENT)
Dept: OBGYN CLINIC | Facility: CLINIC | Age: 30
End: 2020-11-25

## 2020-11-25 VITALS
SYSTOLIC BLOOD PRESSURE: 127 MMHG | DIASTOLIC BLOOD PRESSURE: 63 MMHG | WEIGHT: 174.2 LBS | BODY MASS INDEX: 32.91 KG/M2 | HEART RATE: 88 BPM

## 2020-11-25 DIAGNOSIS — Z3A.36 36 WEEKS GESTATION OF PREGNANCY: Primary | ICD-10-CM

## 2020-11-25 PROCEDURE — 99214 OFFICE O/P EST MOD 30 MIN: CPT | Performed by: OBSTETRICS & GYNECOLOGY

## 2020-11-25 PROCEDURE — 87081 CULTURE SCREEN ONLY: CPT | Performed by: OBSTETRICS & GYNECOLOGY

## 2020-11-27 LAB — GP B STREP GENITAL QL CULT: NORMAL

## 2020-11-30 ENCOUNTER — NURSE TRIAGE (OUTPATIENT)
Dept: OTHER | Facility: OTHER | Age: 30
End: 2020-11-30

## 2020-11-30 ENCOUNTER — HOSPITAL ENCOUNTER (OUTPATIENT)
Facility: HOSPITAL | Age: 30
Discharge: HOME | End: 2020-11-30
Attending: OBSTETRICS & GYNECOLOGY | Admitting: OBSTETRICS & GYNECOLOGY
Payer: COMMERCIAL

## 2020-11-30 VITALS
HEART RATE: 76 BPM | SYSTOLIC BLOOD PRESSURE: 122 MMHG | DIASTOLIC BLOOD PRESSURE: 74 MMHG | BODY MASS INDEX: 32.85 KG/M2 | WEIGHT: 174 LBS | RESPIRATION RATE: 18 BRPM | TEMPERATURE: 98.1 F | HEIGHT: 61 IN

## 2020-11-30 PROBLEM — Z3A.36 36 WEEKS GESTATION OF PREGNANCY: Status: ACTIVE | Noted: 2020-11-30

## 2020-11-30 LAB
ABO GROUP BLD: NORMAL
BASOPHILS # BLD AUTO: 0.03 THOUSANDS/ΜL (ref 0–0.1)
BASOPHILS NFR BLD AUTO: 0 % (ref 0–1)
BLD GP AB SCN SERPL QL: NEGATIVE
EOSINOPHIL # BLD AUTO: 0.03 THOUSAND/ΜL (ref 0–0.61)
EOSINOPHIL NFR BLD AUTO: 0 % (ref 0–6)
ERYTHROCYTE [DISTWIDTH] IN BLOOD BY AUTOMATED COUNT: 14 % (ref 11.6–15.1)
GLUCOSE SERPL-MCNC: 67 MG/DL (ref 65–140)
HCT VFR BLD AUTO: 40.5 % (ref 34.8–46.1)
HGB BLD-MCNC: 13.2 G/DL (ref 11.5–15.4)
IMM GRANULOCYTES # BLD AUTO: 0.03 THOUSAND/UL (ref 0–0.2)
IMM GRANULOCYTES NFR BLD AUTO: 0 % (ref 0–2)
LYMPHOCYTES # BLD AUTO: 1.84 THOUSANDS/ΜL (ref 0.6–4.47)
LYMPHOCYTES NFR BLD AUTO: 16 % (ref 14–44)
MCH RBC QN AUTO: 28.3 PG (ref 26.8–34.3)
MCHC RBC AUTO-ENTMCNC: 32.6 G/DL (ref 31.4–37.4)
MCV RBC AUTO: 87 FL (ref 82–98)
MONOCYTES # BLD AUTO: 0.55 THOUSAND/ΜL (ref 0.17–1.22)
MONOCYTES NFR BLD AUTO: 5 % (ref 4–12)
NEUTROPHILS # BLD AUTO: 8.71 THOUSANDS/ΜL (ref 1.85–7.62)
NEUTS SEG NFR BLD AUTO: 79 % (ref 43–75)
NRBC BLD AUTO-RTO: 0 /100 WBCS
PLATELET # BLD AUTO: 141 THOUSANDS/UL (ref 149–390)
PMV BLD AUTO: 11.2 FL (ref 8.9–12.7)
RBC # BLD AUTO: 4.66 MILLION/UL (ref 3.81–5.12)
RH BLD: POSITIVE
SPECIMEN EXPIRATION DATE: NORMAL
WBC # BLD AUTO: 11.19 THOUSAND/UL (ref 4.31–10.16)

## 2020-11-30 PROCEDURE — 86592 SYPHILIS TEST NON-TREP QUAL: CPT | Performed by: OBSTETRICS & GYNECOLOGY

## 2020-11-30 PROCEDURE — 86850 RBC ANTIBODY SCREEN: CPT | Performed by: OBSTETRICS & GYNECOLOGY

## 2020-11-30 PROCEDURE — 99213 OFFICE O/P EST LOW 20 MIN: CPT | Performed by: OBSTETRICS & GYNECOLOGY

## 2020-11-30 PROCEDURE — 82948 REAGENT STRIP/BLOOD GLUCOSE: CPT

## 2020-11-30 PROCEDURE — 86901 BLOOD TYPING SEROLOGIC RH(D): CPT | Performed by: OBSTETRICS & GYNECOLOGY

## 2020-11-30 PROCEDURE — 85025 COMPLETE CBC W/AUTO DIFF WBC: CPT | Performed by: OBSTETRICS & GYNECOLOGY

## 2020-11-30 PROCEDURE — 86900 BLOOD TYPING SEROLOGIC ABO: CPT | Performed by: OBSTETRICS & GYNECOLOGY

## 2020-11-30 PROCEDURE — 99214 OFFICE O/P EST MOD 30 MIN: CPT

## 2020-11-30 RX ADMIN — SODIUM CHLORIDE 1000 ML: 0.9 INJECTION, SOLUTION INTRAVENOUS at 09:24

## 2020-12-01 ENCOUNTER — TELEPHONE (OUTPATIENT)
Dept: OBGYN CLINIC | Facility: CLINIC | Age: 30
End: 2020-12-01

## 2020-12-01 ENCOUNTER — HOSPITAL ENCOUNTER (OUTPATIENT)
Facility: HOSPITAL | Age: 30
Discharge: HOME | End: 2020-12-01
Attending: OBSTETRICS & GYNECOLOGY | Admitting: OBSTETRICS & GYNECOLOGY
Payer: COMMERCIAL

## 2020-12-01 VITALS
HEIGHT: 61 IN | DIASTOLIC BLOOD PRESSURE: 65 MMHG | TEMPERATURE: 98.4 F | HEART RATE: 82 BPM | SYSTOLIC BLOOD PRESSURE: 114 MMHG | WEIGHT: 174 LBS | RESPIRATION RATE: 16 BRPM | BODY MASS INDEX: 32.85 KG/M2

## 2020-12-01 PROBLEM — Z3A.37 37 WEEKS GESTATION OF PREGNANCY: Status: ACTIVE | Noted: 2020-11-30

## 2020-12-01 LAB — RPR SER QL: NORMAL

## 2020-12-01 PROCEDURE — 99211 OFF/OP EST MAY X REQ PHY/QHP: CPT

## 2020-12-01 PROCEDURE — 99213 OFFICE O/P EST LOW 20 MIN: CPT | Performed by: OBSTETRICS & GYNECOLOGY

## 2020-12-02 ENCOUNTER — ROUTINE PRENATAL (OUTPATIENT)
Dept: OBGYN CLINIC | Facility: CLINIC | Age: 30
End: 2020-12-02

## 2020-12-02 ENCOUNTER — PATIENT OUTREACH (OUTPATIENT)
Dept: OBGYN CLINIC | Facility: CLINIC | Age: 30
End: 2020-12-02

## 2020-12-02 ENCOUNTER — TELEPHONE (OUTPATIENT)
Dept: OBGYN CLINIC | Facility: CLINIC | Age: 30
End: 2020-12-02

## 2020-12-02 VITALS
BODY MASS INDEX: 32.28 KG/M2 | SYSTOLIC BLOOD PRESSURE: 135 MMHG | HEIGHT: 61 IN | HEART RATE: 66 BPM | WEIGHT: 171 LBS | DIASTOLIC BLOOD PRESSURE: 68 MMHG

## 2020-12-02 DIAGNOSIS — O99.210 OBESITY IN PREGNANCY: ICD-10-CM

## 2020-12-02 DIAGNOSIS — O24.419 GESTATIONAL DIABETES MELLITUS (GDM) IN THIRD TRIMESTER, GESTATIONAL DIABETES METHOD OF CONTROL UNSPECIFIED: ICD-10-CM

## 2020-12-02 DIAGNOSIS — Z78.9 NEED FOR FOLLOW-UP BY SOCIAL WORKER: ICD-10-CM

## 2020-12-02 DIAGNOSIS — Z3A.37 37 WEEKS GESTATION OF PREGNANCY: ICD-10-CM

## 2020-12-02 DIAGNOSIS — O99.013 ANEMIA DURING PREGNANCY IN THIRD TRIMESTER: ICD-10-CM

## 2020-12-02 DIAGNOSIS — Z34.93 PRENATAL CARE IN THIRD TRIMESTER: Primary | ICD-10-CM

## 2020-12-02 PROCEDURE — 99215 OFFICE O/P EST HI 40 MIN: CPT | Performed by: NURSE PRACTITIONER

## 2020-12-03 ENCOUNTER — PATIENT OUTREACH (OUTPATIENT)
Dept: OBGYN CLINIC | Facility: CLINIC | Age: 30
End: 2020-12-03

## 2020-12-03 ENCOUNTER — DOCUMENTATION (OUTPATIENT)
Dept: PERINATAL CARE | Facility: CLINIC | Age: 30
End: 2020-12-03

## 2020-12-04 ENCOUNTER — TELEPHONE (OUTPATIENT)
Dept: PERINATAL CARE | Facility: OTHER | Age: 30
End: 2020-12-04

## 2020-12-04 ENCOUNTER — PATIENT OUTREACH (OUTPATIENT)
Dept: OBGYN CLINIC | Facility: CLINIC | Age: 30
End: 2020-12-04

## 2020-12-07 ENCOUNTER — ULTRASOUND (OUTPATIENT)
Dept: PERINATAL CARE | Facility: OTHER | Age: 30
End: 2020-12-07
Payer: COMMERCIAL

## 2020-12-07 ENCOUNTER — PATIENT OUTREACH (OUTPATIENT)
Dept: OBGYN CLINIC | Facility: CLINIC | Age: 30
End: 2020-12-07

## 2020-12-07 VITALS
WEIGHT: 173.4 LBS | DIASTOLIC BLOOD PRESSURE: 74 MMHG | SYSTOLIC BLOOD PRESSURE: 114 MMHG | HEART RATE: 88 BPM | BODY MASS INDEX: 32.74 KG/M2 | HEIGHT: 61 IN

## 2020-12-07 DIAGNOSIS — Z36.89 ENCOUNTER FOR ULTRASOUND TO CHECK FETAL GROWTH: ICD-10-CM

## 2020-12-07 DIAGNOSIS — O40.3XX0 POLYHYDRAMNIOS IN THIRD TRIMESTER COMPLICATION, SINGLE OR UNSPECIFIED FETUS: Primary | ICD-10-CM

## 2020-12-07 DIAGNOSIS — O24.419 GESTATIONAL DIABETES MELLITUS (GDM) IN THIRD TRIMESTER, GESTATIONAL DIABETES METHOD OF CONTROL UNSPECIFIED: ICD-10-CM

## 2020-12-07 PROCEDURE — 76819 FETAL BIOPHYS PROFIL W/O NST: CPT | Performed by: OBSTETRICS & GYNECOLOGY

## 2020-12-07 PROCEDURE — 3008F BODY MASS INDEX DOCD: CPT | Performed by: OBSTETRICS & GYNECOLOGY

## 2020-12-07 PROCEDURE — 76816 OB US FOLLOW-UP PER FETUS: CPT | Performed by: OBSTETRICS & GYNECOLOGY

## 2020-12-07 PROCEDURE — 99212 OFFICE O/P EST SF 10 MIN: CPT | Performed by: OBSTETRICS & GYNECOLOGY

## 2020-12-08 ENCOUNTER — TELEPHONE (OUTPATIENT)
Dept: OBGYN CLINIC | Facility: CLINIC | Age: 30
End: 2020-12-08

## 2020-12-09 ENCOUNTER — TELEPHONE (OUTPATIENT)
Dept: OBGYN CLINIC | Facility: CLINIC | Age: 30
End: 2020-12-09

## 2020-12-10 ENCOUNTER — ROUTINE PRENATAL (OUTPATIENT)
Dept: OBGYN CLINIC | Facility: CLINIC | Age: 30
End: 2020-12-10

## 2020-12-10 ENCOUNTER — DOCUMENTATION (OUTPATIENT)
Dept: PERINATAL CARE | Facility: CLINIC | Age: 30
End: 2020-12-10

## 2020-12-10 VITALS
HEART RATE: 93 BPM | WEIGHT: 174 LBS | BODY MASS INDEX: 32.88 KG/M2 | SYSTOLIC BLOOD PRESSURE: 122 MMHG | DIASTOLIC BLOOD PRESSURE: 79 MMHG

## 2020-12-10 DIAGNOSIS — Z3A.38 38 WEEKS GESTATION OF PREGNANCY: Primary | ICD-10-CM

## 2020-12-10 DIAGNOSIS — O34.219 PREVIOUS CESAREAN DELIVERY, ANTEPARTUM: ICD-10-CM

## 2020-12-10 PROCEDURE — 99213 OFFICE O/P EST LOW 20 MIN: CPT | Performed by: OBSTETRICS & GYNECOLOGY

## 2020-12-11 ENCOUNTER — PATIENT OUTREACH (OUTPATIENT)
Dept: OBGYN CLINIC | Facility: CLINIC | Age: 30
End: 2020-12-11

## 2020-12-11 DIAGNOSIS — K59.00 CONSTIPATION: Primary | ICD-10-CM

## 2020-12-11 PROBLEM — Z3A.38 38 WEEKS GESTATION OF PREGNANCY: Status: ACTIVE | Noted: 2020-12-11

## 2020-12-11 RX ORDER — DOCUSATE SODIUM 100 MG/1
100 CAPSULE, LIQUID FILLED ORAL 2 TIMES DAILY
Qty: 10 CAPSULE | Refills: 0 | Status: SHIPPED | OUTPATIENT
Start: 2020-12-11

## 2020-12-14 ENCOUNTER — PATIENT OUTREACH (OUTPATIENT)
Dept: OBGYN CLINIC | Facility: CLINIC | Age: 30
End: 2020-12-14

## 2020-12-14 ENCOUNTER — ANESTHESIA EVENT (INPATIENT)
Dept: LABOR AND DELIVERY | Facility: HOSPITAL | Age: 30
DRG: 539 | End: 2020-12-14
Payer: COMMERCIAL

## 2020-12-15 ENCOUNTER — ANESTHESIA (INPATIENT)
Dept: LABOR AND DELIVERY | Facility: HOSPITAL | Age: 30
DRG: 539 | End: 2020-12-15
Payer: COMMERCIAL

## 2020-12-15 ENCOUNTER — PATIENT OUTREACH (OUTPATIENT)
Dept: OBGYN CLINIC | Facility: CLINIC | Age: 30
End: 2020-12-15

## 2020-12-15 ENCOUNTER — HOSPITAL ENCOUNTER (INPATIENT)
Facility: HOSPITAL | Age: 30
LOS: 2 days | Discharge: HOME | DRG: 539 | End: 2020-12-17
Attending: OBSTETRICS & GYNECOLOGY | Admitting: OBSTETRICS & GYNECOLOGY
Payer: COMMERCIAL

## 2020-12-15 VITALS — HEART RATE: 57 BPM

## 2020-12-15 DIAGNOSIS — Z98.891 S/P CESAREAN SECTION: ICD-10-CM

## 2020-12-15 DIAGNOSIS — Z3A.39 39 WEEKS GESTATION OF PREGNANCY: Primary | ICD-10-CM

## 2020-12-15 DIAGNOSIS — O23.00 PYELONEPHRITIS DURING PREGNANCY: ICD-10-CM

## 2020-12-15 LAB
ABO GROUP BLD: NORMAL
BASE EXCESS BLDCOA CALC-SCNC: -9.5 MMOL/L (ref 3–11)
BASE EXCESS BLDCOV CALC-SCNC: -7.2 MMOL/L (ref 1–9)
BASOPHILS # BLD AUTO: 0.04 THOUSANDS/ΜL (ref 0–0.1)
BASOPHILS NFR BLD AUTO: 0 % (ref 0–1)
BLD GP AB SCN SERPL QL: NEGATIVE
EOSINOPHIL # BLD AUTO: 0.04 THOUSAND/ΜL (ref 0–0.61)
EOSINOPHIL NFR BLD AUTO: 0 % (ref 0–6)
ERYTHROCYTE [DISTWIDTH] IN BLOOD BY AUTOMATED COUNT: 13.4 % (ref 11.6–15.1)
GLUCOSE SERPL-MCNC: 61 MG/DL (ref 65–140)
GLUCOSE SERPL-MCNC: 65 MG/DL (ref 65–140)
HCO3 BLDCOA-SCNC: 19.3 MMOL/L (ref 17.3–27.3)
HCO3 BLDCOV-SCNC: 21.2 MMOL/L (ref 12.2–28.6)
HCT VFR BLD AUTO: 39.2 % (ref 34.8–46.1)
HGB BLD-MCNC: 13.2 G/DL (ref 11.5–15.4)
IMM GRANULOCYTES # BLD AUTO: 0.09 THOUSAND/UL (ref 0–0.2)
IMM GRANULOCYTES NFR BLD AUTO: 1 % (ref 0–2)
LYMPHOCYTES # BLD AUTO: 2.35 THOUSANDS/ΜL (ref 0.6–4.47)
LYMPHOCYTES NFR BLD AUTO: 18 % (ref 14–44)
MCH RBC QN AUTO: 29.7 PG (ref 26.8–34.3)
MCHC RBC AUTO-ENTMCNC: 33.7 G/DL (ref 31.4–37.4)
MCV RBC AUTO: 88 FL (ref 82–98)
MONOCYTES # BLD AUTO: 0.78 THOUSAND/ΜL (ref 0.17–1.22)
MONOCYTES NFR BLD AUTO: 6 % (ref 4–12)
NEUTROPHILS # BLD AUTO: 9.71 THOUSANDS/ΜL (ref 1.85–7.62)
NEUTS SEG NFR BLD AUTO: 75 % (ref 43–75)
NRBC BLD AUTO-RTO: 0 /100 WBCS
O2 CT VFR BLDCOA CALC: 8.8 ML/DL
OXYHGB MFR BLDCOA: 38.7 %
OXYHGB MFR BLDCOV: 31.4 %
PCO2 BLDCOA: 53.2 MM[HG] (ref 30–60)
PCO2 BLDCOV: 53.7 MM HG (ref 27–43)
PH BLDCOA: 7.18 [PH] (ref 7.23–7.43)
PH BLDCOV: 7.21 [PH] (ref 7.19–7.49)
PLATELET # BLD AUTO: 161 THOUSANDS/UL (ref 149–390)
PMV BLD AUTO: 11.3 FL (ref 8.9–12.7)
PO2 BLDCOA: 19.7 MM HG (ref 5–25)
PO2 BLDCOV: 17.3 MM HG (ref 15–45)
RBC # BLD AUTO: 4.45 MILLION/UL (ref 3.81–5.12)
RH BLD: POSITIVE
RPR SER QL: NORMAL
SAO2 % BLDCOV: 7.1 ML/DL
SPECIMEN EXPIRATION DATE: NORMAL
WBC # BLD AUTO: 13.01 THOUSAND/UL (ref 4.31–10.16)

## 2020-12-15 PROCEDURE — NC001 PR NO CHARGE: Performed by: OBSTETRICS & GYNECOLOGY

## 2020-12-15 PROCEDURE — 59514 CESAREAN DELIVERY ONLY: CPT | Performed by: OBSTETRICS & GYNECOLOGY

## 2020-12-15 PROCEDURE — 58611 LIGATE OVIDUCT(S) ADD-ON: CPT | Performed by: OBSTETRICS & GYNECOLOGY

## 2020-12-15 PROCEDURE — 86901 BLOOD TYPING SEROLOGIC RH(D): CPT | Performed by: OBSTETRICS & GYNECOLOGY

## 2020-12-15 PROCEDURE — 88302 TISSUE EXAM BY PATHOLOGIST: CPT | Performed by: PATHOLOGY

## 2020-12-15 PROCEDURE — 82948 REAGENT STRIP/BLOOD GLUCOSE: CPT

## 2020-12-15 PROCEDURE — 86850 RBC ANTIBODY SCREEN: CPT | Performed by: OBSTETRICS & GYNECOLOGY

## 2020-12-15 PROCEDURE — 82805 BLOOD GASES W/O2 SATURATION: CPT | Performed by: OBSTETRICS & GYNECOLOGY

## 2020-12-15 PROCEDURE — 86592 SYPHILIS TEST NON-TREP QUAL: CPT | Performed by: OBSTETRICS & GYNECOLOGY

## 2020-12-15 PROCEDURE — 85025 COMPLETE CBC W/AUTO DIFF WBC: CPT | Performed by: OBSTETRICS & GYNECOLOGY

## 2020-12-15 PROCEDURE — 0UT70ZZ RESECTION OF BILATERAL FALLOPIAN TUBES, OPEN APPROACH: ICD-10-PCS | Performed by: OBSTETRICS & GYNECOLOGY

## 2020-12-15 PROCEDURE — 86900 BLOOD TYPING SEROLOGIC ABO: CPT | Performed by: OBSTETRICS & GYNECOLOGY

## 2020-12-15 RX ORDER — ACETAMINOPHEN 325 MG/1
650 TABLET ORAL EVERY 4 HOURS PRN
Status: DISCONTINUED | OUTPATIENT
Start: 2020-12-15 | End: 2020-12-17

## 2020-12-15 RX ORDER — FENTANYL CITRATE 50 UG/ML
INJECTION, SOLUTION INTRAMUSCULAR; INTRAVENOUS AS NEEDED
Status: DISCONTINUED | OUTPATIENT
Start: 2020-12-15 | End: 2020-12-15

## 2020-12-15 RX ORDER — OXYTOCIN/RINGER'S LACTATE 30/500 ML
62.5 PLASTIC BAG, INJECTION (ML) INTRAVENOUS ONCE
Status: COMPLETED | OUTPATIENT
Start: 2020-12-15 | End: 2020-12-15

## 2020-12-15 RX ORDER — ONDANSETRON 2 MG/ML
4 INJECTION INTRAMUSCULAR; INTRAVENOUS ONCE AS NEEDED
Status: DISCONTINUED | OUTPATIENT
Start: 2020-12-15 | End: 2020-12-15

## 2020-12-15 RX ORDER — DEXAMETHASONE SODIUM PHOSPHATE 4 MG/ML
8 INJECTION, SOLUTION INTRA-ARTICULAR; INTRALESIONAL; INTRAMUSCULAR; INTRAVENOUS; SOFT TISSUE ONCE AS NEEDED
Status: ACTIVE | OUTPATIENT
Start: 2020-12-15 | End: 2020-12-16

## 2020-12-15 RX ORDER — OXYCODONE HYDROCHLORIDE AND ACETAMINOPHEN 5; 325 MG/1; MG/1
1 TABLET ORAL EVERY 6 HOURS PRN
Status: ACTIVE | OUTPATIENT
Start: 2020-12-15 | End: 2020-12-16

## 2020-12-15 RX ORDER — DIPHENHYDRAMINE HCL 25 MG
25 TABLET ORAL EVERY 6 HOURS PRN
Status: DISCONTINUED | OUTPATIENT
Start: 2020-12-16 | End: 2020-12-17 | Stop reason: HOSPADM

## 2020-12-15 RX ORDER — DIPHENHYDRAMINE HYDROCHLORIDE 50 MG/ML
INJECTION INTRAMUSCULAR; INTRAVENOUS AS NEEDED
Status: DISCONTINUED | OUTPATIENT
Start: 2020-12-15 | End: 2020-12-15

## 2020-12-15 RX ORDER — OXYCODONE HYDROCHLORIDE AND ACETAMINOPHEN 5; 325 MG/1; MG/1
1 TABLET ORAL EVERY 4 HOURS PRN
Status: DISCONTINUED | OUTPATIENT
Start: 2020-12-16 | End: 2020-12-17

## 2020-12-15 RX ORDER — CEFAZOLIN SODIUM 2 G/50ML
2000 SOLUTION INTRAVENOUS ONCE
Status: DISCONTINUED | OUTPATIENT
Start: 2020-12-15 | End: 2020-12-15

## 2020-12-15 RX ORDER — ONDANSETRON 2 MG/ML
4 INJECTION INTRAMUSCULAR; INTRAVENOUS EVERY 4 HOURS PRN
Status: ACTIVE | OUTPATIENT
Start: 2020-12-15 | End: 2020-12-16

## 2020-12-15 RX ORDER — IBUPROFEN 600 MG/1
600 TABLET ORAL EVERY 4 HOURS PRN
Status: DISCONTINUED | OUTPATIENT
Start: 2020-12-15 | End: 2020-12-17 | Stop reason: HOSPADM

## 2020-12-15 RX ORDER — ONDANSETRON 2 MG/ML
INJECTION INTRAMUSCULAR; INTRAVENOUS AS NEEDED
Status: DISCONTINUED | OUTPATIENT
Start: 2020-12-15 | End: 2020-12-15

## 2020-12-15 RX ORDER — MORPHINE SULFATE 0.5 MG/ML
INJECTION, SOLUTION EPIDURAL; INTRATHECAL; INTRAVENOUS AS NEEDED
Status: DISCONTINUED | OUTPATIENT
Start: 2020-12-15 | End: 2020-12-15

## 2020-12-15 RX ORDER — FENTANYL CITRATE/PF 50 MCG/ML
25 SYRINGE (ML) INJECTION
Status: DISCONTINUED | OUTPATIENT
Start: 2020-12-15 | End: 2020-12-15

## 2020-12-15 RX ORDER — BUPIVACAINE HYDROCHLORIDE 7.5 MG/ML
INJECTION, SOLUTION INTRASPINAL AS NEEDED
Status: DISCONTINUED | OUTPATIENT
Start: 2020-12-15 | End: 2020-12-15

## 2020-12-15 RX ORDER — MORPHINE SULFATE 0.5 MG/ML
INJECTION, SOLUTION EPIDURAL; INTRATHECAL; INTRAVENOUS
Status: COMPLETED
Start: 2020-12-15 | End: 2020-12-15

## 2020-12-15 RX ORDER — KETOROLAC TROMETHAMINE 30 MG/ML
INJECTION, SOLUTION INTRAMUSCULAR; INTRAVENOUS AS NEEDED
Status: DISCONTINUED | OUTPATIENT
Start: 2020-12-15 | End: 2020-12-15

## 2020-12-15 RX ORDER — METOCLOPRAMIDE HYDROCHLORIDE 5 MG/ML
5 INJECTION INTRAMUSCULAR; INTRAVENOUS EVERY 6 HOURS PRN
Status: ACTIVE | OUTPATIENT
Start: 2020-12-15 | End: 2020-12-16

## 2020-12-15 RX ORDER — KETOROLAC TROMETHAMINE 30 MG/ML
30 INJECTION, SOLUTION INTRAMUSCULAR; INTRAVENOUS EVERY 6 HOURS
Status: COMPLETED | OUTPATIENT
Start: 2020-12-15 | End: 2020-12-16

## 2020-12-15 RX ORDER — CALCIUM CARBONATE 200(500)MG
1000 TABLET,CHEWABLE ORAL DAILY PRN
Status: DISCONTINUED | OUTPATIENT
Start: 2020-12-15 | End: 2020-12-17 | Stop reason: HOSPADM

## 2020-12-15 RX ORDER — OXYTOCIN/RINGER'S LACTATE 30/500 ML
PLASTIC BAG, INJECTION (ML) INTRAVENOUS CONTINUOUS PRN
Status: DISCONTINUED | OUTPATIENT
Start: 2020-12-15 | End: 2020-12-15

## 2020-12-15 RX ORDER — CEFAZOLIN SODIUM 2 G/50ML
SOLUTION INTRAVENOUS AS NEEDED
Status: DISCONTINUED | OUTPATIENT
Start: 2020-12-15 | End: 2020-12-15

## 2020-12-15 RX ORDER — FENTANYL CITRATE 50 UG/ML
INJECTION, SOLUTION INTRAMUSCULAR; INTRAVENOUS
Status: COMPLETED
Start: 2020-12-15 | End: 2020-12-15

## 2020-12-15 RX ORDER — ONDANSETRON 2 MG/ML
4 INJECTION INTRAMUSCULAR; INTRAVENOUS EVERY 8 HOURS PRN
Status: DISCONTINUED | OUTPATIENT
Start: 2020-12-16 | End: 2020-12-17 | Stop reason: HOSPADM

## 2020-12-15 RX ORDER — DIAPER,BRIEF,INFANT-TODD,DISP
1 EACH MISCELLANEOUS DAILY PRN
Status: DISCONTINUED | OUTPATIENT
Start: 2020-12-15 | End: 2020-12-17 | Stop reason: HOSPADM

## 2020-12-15 RX ORDER — OXYCODONE HYDROCHLORIDE AND ACETAMINOPHEN 5; 325 MG/1; MG/1
2 TABLET ORAL EVERY 4 HOURS PRN
Status: DISCONTINUED | OUTPATIENT
Start: 2020-12-16 | End: 2020-12-17

## 2020-12-15 RX ORDER — SIMETHICONE 80 MG
80 TABLET,CHEWABLE ORAL 4 TIMES DAILY PRN
Status: DISCONTINUED | OUTPATIENT
Start: 2020-12-15 | End: 2020-12-17 | Stop reason: HOSPADM

## 2020-12-15 RX ORDER — SODIUM CHLORIDE 9 MG/ML
125 INJECTION, SOLUTION INTRAVENOUS CONTINUOUS
Status: DISCONTINUED | OUTPATIENT
Start: 2020-12-15 | End: 2020-12-17

## 2020-12-15 RX ORDER — DIPHENHYDRAMINE HYDROCHLORIDE 50 MG/ML
25 INJECTION INTRAMUSCULAR; INTRAVENOUS EVERY 6 HOURS PRN
Status: DISCONTINUED | OUTPATIENT
Start: 2020-12-15 | End: 2020-12-16

## 2020-12-15 RX ORDER — NALOXONE HYDROCHLORIDE 0.4 MG/ML
0.1 INJECTION, SOLUTION INTRAMUSCULAR; INTRAVENOUS; SUBCUTANEOUS
Status: ACTIVE | OUTPATIENT
Start: 2020-12-15 | End: 2020-12-16

## 2020-12-15 RX ORDER — ONDANSETRON 2 MG/ML
4 INJECTION INTRAMUSCULAR; INTRAVENOUS EVERY 8 HOURS PRN
Status: DISCONTINUED | OUTPATIENT
Start: 2020-12-15 | End: 2020-12-15

## 2020-12-15 RX ORDER — DOCUSATE SODIUM 100 MG/1
100 CAPSULE, LIQUID FILLED ORAL 2 TIMES DAILY
Status: DISCONTINUED | OUTPATIENT
Start: 2020-12-15 | End: 2020-12-17 | Stop reason: HOSPADM

## 2020-12-15 RX ADMIN — DOCUSATE SODIUM 100 MG: 100 CAPSULE, LIQUID FILLED ORAL at 19:21

## 2020-12-15 RX ADMIN — KETOROLAC TROMETHAMINE 30 MG: 30 INJECTION, SOLUTION INTRAMUSCULAR at 19:21

## 2020-12-15 RX ADMIN — KETOROLAC TROMETHAMINE 30 MG: 30 INJECTION, SOLUTION INTRAMUSCULAR at 11:42

## 2020-12-15 RX ADMIN — SODIUM CHLORIDE 1000 ML: 0.9 INJECTION, SOLUTION INTRAVENOUS at 08:20

## 2020-12-15 RX ADMIN — Medication 250 MILLI-UNITS/MIN: at 10:48

## 2020-12-15 RX ADMIN — Medication 62.5 MILLI-UNITS/MIN: at 13:11

## 2020-12-15 RX ADMIN — BUPIVACAINE HYDROCHLORIDE IN DEXTROSE 1.7 ML: 7.5 INJECTION, SOLUTION SUBARACHNOID at 10:16

## 2020-12-15 RX ADMIN — CEFAZOLIN SODIUM 2000 MG: 2 SOLUTION INTRAVENOUS at 09:55

## 2020-12-15 RX ADMIN — DIPHENHYDRAMINE HYDROCHLORIDE 50 MG: 50 INJECTION, SOLUTION INTRAMUSCULAR; INTRAVENOUS at 11:26

## 2020-12-15 RX ADMIN — FENTANYL CITRATE 20 MCG: 50 INJECTION, SOLUTION INTRAMUSCULAR; INTRAVENOUS at 10:16

## 2020-12-15 RX ADMIN — ONDANSETRON 4 MG: 2 INJECTION INTRAMUSCULAR; INTRAVENOUS at 10:24

## 2020-12-15 RX ADMIN — SODIUM CHLORIDE 999 ML/HR: 0.9 INJECTION, SOLUTION INTRAVENOUS at 09:15

## 2020-12-15 RX ADMIN — MORPHINE SULFATE 0.2 MG: 0.5 INJECTION, SOLUTION EPIDURAL; INTRATHECAL; INTRAVENOUS at 10:16

## 2020-12-16 LAB
BASOPHILS # BLD AUTO: 0.03 THOUSANDS/ΜL (ref 0–0.1)
BASOPHILS NFR BLD AUTO: 0 % (ref 0–1)
EOSINOPHIL # BLD AUTO: 0.06 THOUSAND/ΜL (ref 0–0.61)
EOSINOPHIL NFR BLD AUTO: 0 % (ref 0–6)
ERYTHROCYTE [DISTWIDTH] IN BLOOD BY AUTOMATED COUNT: 13.5 % (ref 11.6–15.1)
HCT VFR BLD AUTO: 35.7 % (ref 34.8–46.1)
HGB BLD-MCNC: 11.6 G/DL (ref 11.5–15.4)
IMM GRANULOCYTES # BLD AUTO: 0.11 THOUSAND/UL (ref 0–0.2)
IMM GRANULOCYTES NFR BLD AUTO: 1 % (ref 0–2)
LYMPHOCYTES # BLD AUTO: 2.22 THOUSANDS/ΜL (ref 0.6–4.47)
LYMPHOCYTES NFR BLD AUTO: 14 % (ref 14–44)
MCH RBC QN AUTO: 29.5 PG (ref 26.8–34.3)
MCHC RBC AUTO-ENTMCNC: 32.5 G/DL (ref 31.4–37.4)
MCV RBC AUTO: 91 FL (ref 82–98)
MONOCYTES # BLD AUTO: 0.8 THOUSAND/ΜL (ref 0.17–1.22)
MONOCYTES NFR BLD AUTO: 5 % (ref 4–12)
NEUTROPHILS # BLD AUTO: 13.15 THOUSANDS/ΜL (ref 1.85–7.62)
NEUTS SEG NFR BLD AUTO: 80 % (ref 43–75)
NRBC BLD AUTO-RTO: 0 /100 WBCS
PLATELET # BLD AUTO: 129 THOUSANDS/UL (ref 149–390)
PMV BLD AUTO: 12.2 FL (ref 8.9–12.7)
RBC # BLD AUTO: 3.93 MILLION/UL (ref 3.81–5.12)
WBC # BLD AUTO: 16.37 THOUSAND/UL (ref 4.31–10.16)

## 2020-12-16 PROCEDURE — 99024 POSTOP FOLLOW-UP VISIT: CPT | Performed by: OBSTETRICS & GYNECOLOGY

## 2020-12-16 PROCEDURE — 85025 COMPLETE CBC W/AUTO DIFF WBC: CPT | Performed by: OBSTETRICS & GYNECOLOGY

## 2020-12-16 RX ADMIN — IBUPROFEN 600 MG: 600 TABLET, FILM COATED ORAL at 21:07

## 2020-12-16 RX ADMIN — KETOROLAC TROMETHAMINE 30 MG: 30 INJECTION, SOLUTION INTRAMUSCULAR at 13:31

## 2020-12-16 RX ADMIN — DOCUSATE SODIUM 100 MG: 100 CAPSULE, LIQUID FILLED ORAL at 07:39

## 2020-12-16 RX ADMIN — OXYCODONE HYDROCHLORIDE AND ACETAMINOPHEN 1 TABLET: 5; 325 TABLET ORAL at 22:25

## 2020-12-16 RX ADMIN — DOCUSATE SODIUM 100 MG: 100 CAPSULE, LIQUID FILLED ORAL at 17:15

## 2020-12-16 RX ADMIN — KETOROLAC TROMETHAMINE 30 MG: 30 INJECTION, SOLUTION INTRAMUSCULAR at 07:38

## 2020-12-16 RX ADMIN — KETOROLAC TROMETHAMINE 30 MG: 30 INJECTION, SOLUTION INTRAMUSCULAR at 01:45

## 2020-12-17 VITALS
HEART RATE: 70 BPM | RESPIRATION RATE: 18 BRPM | OXYGEN SATURATION: 98 % | WEIGHT: 173 LBS | HEIGHT: 61 IN | BODY MASS INDEX: 32.66 KG/M2 | SYSTOLIC BLOOD PRESSURE: 118 MMHG | DIASTOLIC BLOOD PRESSURE: 70 MMHG | TEMPERATURE: 98.5 F

## 2020-12-17 LAB — GLUCOSE SERPL-MCNC: 78 MG/DL (ref 65–140)

## 2020-12-17 PROCEDURE — 99024 POSTOP FOLLOW-UP VISIT: CPT | Performed by: OBSTETRICS & GYNECOLOGY

## 2020-12-17 PROCEDURE — 82948 REAGENT STRIP/BLOOD GLUCOSE: CPT

## 2020-12-17 RX ORDER — ACETAMINOPHEN 325 MG/1
650 TABLET ORAL EVERY 6 HOURS SCHEDULED
Status: DISCONTINUED | OUTPATIENT
Start: 2020-12-17 | End: 2020-12-17 | Stop reason: HOSPADM

## 2020-12-17 RX ORDER — OXYCODONE HYDROCHLORIDE 5 MG/1
5 TABLET ORAL EVERY 4 HOURS PRN
Status: DISCONTINUED | OUTPATIENT
Start: 2020-12-17 | End: 2020-12-17 | Stop reason: HOSPADM

## 2020-12-17 RX ORDER — OXYCODONE HYDROCHLORIDE 10 MG/1
10 TABLET ORAL EVERY 4 HOURS PRN
Status: DISCONTINUED | OUTPATIENT
Start: 2020-12-17 | End: 2020-12-17 | Stop reason: HOSPADM

## 2020-12-17 RX ORDER — NITROFURANTOIN 25; 75 MG/1; MG/1
100 CAPSULE ORAL DAILY
Qty: 42 CAPSULE | Refills: 0 | Status: SHIPPED | OUTPATIENT
Start: 2020-12-17 | End: 2021-01-28

## 2020-12-17 RX ORDER — OXYCODONE HYDROCHLORIDE 5 MG/1
5 TABLET ORAL EVERY 4 HOURS PRN
Qty: 10 TABLET | Refills: 0 | Status: SHIPPED | OUTPATIENT
Start: 2020-12-17 | End: 2020-12-27

## 2020-12-17 RX ADMIN — OXYCODONE HYDROCHLORIDE 5 MG: 5 TABLET ORAL at 11:59

## 2020-12-17 RX ADMIN — OXYCODONE HYDROCHLORIDE 5 MG: 5 TABLET ORAL at 16:45

## 2020-12-17 RX ADMIN — OXYCODONE HYDROCHLORIDE 5 MG: 5 TABLET ORAL at 07:47

## 2020-12-17 RX ADMIN — ACETAMINOPHEN 650 MG: 325 TABLET ORAL at 11:59

## 2020-12-17 RX ADMIN — IBUPROFEN 600 MG: 600 TABLET, FILM COATED ORAL at 07:47

## 2020-12-17 RX ADMIN — ACETAMINOPHEN 650 MG: 325 TABLET ORAL at 05:44

## 2020-12-17 RX ADMIN — DOCUSATE SODIUM 100 MG: 100 CAPSULE, LIQUID FILLED ORAL at 07:44

## 2020-12-18 ENCOUNTER — TELEPHONE (OUTPATIENT)
Dept: OBGYN CLINIC | Facility: CLINIC | Age: 30
End: 2020-12-18

## 2020-12-22 ENCOUNTER — PATIENT OUTREACH (OUTPATIENT)
Dept: OBGYN CLINIC | Facility: CLINIC | Age: 30
End: 2020-12-22

## 2020-12-22 ENCOUNTER — POSTPARTUM VISIT (OUTPATIENT)
Dept: OBGYN CLINIC | Facility: CLINIC | Age: 30
End: 2020-12-22

## 2020-12-22 VITALS
SYSTOLIC BLOOD PRESSURE: 124 MMHG | HEART RATE: 73 BPM | WEIGHT: 161.6 LBS | BODY MASS INDEX: 30.53 KG/M2 | DIASTOLIC BLOOD PRESSURE: 84 MMHG

## 2020-12-22 DIAGNOSIS — Z98.891 S/P C-SECTION: Primary | ICD-10-CM

## 2020-12-22 PROBLEM — O12.10 PROTEINURIA DURING PREGNANCY: Status: RESOLVED | Noted: 2020-09-10 | Resolved: 2020-12-22

## 2020-12-22 PROBLEM — O47.03 PRETERM UTERINE CONTRACTIONS IN THIRD TRIMESTER, ANTEPARTUM: Status: RESOLVED | Noted: 2020-11-07 | Resolved: 2020-12-22

## 2020-12-22 PROBLEM — O09.299 PRIOR PREGNANCY COMPLICATED BY IUGR, ANTEPARTUM: Status: RESOLVED | Noted: 2020-04-29 | Resolved: 2020-12-22

## 2020-12-22 PROBLEM — N13.30 HYDRONEPHROSIS: Status: RESOLVED | Noted: 2020-09-08 | Resolved: 2020-12-22

## 2020-12-22 PROBLEM — Z3A.39 39 WEEKS GESTATION OF PREGNANCY: Status: RESOLVED | Noted: 2020-12-11 | Resolved: 2020-12-22

## 2020-12-22 PROBLEM — O09.899 HISTORY OF PRETERM DELIVERY, CURRENTLY PREGNANT: Status: RESOLVED | Noted: 2020-04-29 | Resolved: 2020-12-22

## 2020-12-22 PROBLEM — O23.00 PYELONEPHRITIS DURING PREGNANCY: Status: RESOLVED | Noted: 2020-05-13 | Resolved: 2020-12-22

## 2020-12-22 PROBLEM — O99.019 ANEMIA IN PREGNANCY: Status: RESOLVED | Noted: 2020-08-13 | Resolved: 2020-12-22

## 2020-12-22 PROBLEM — O40.9XX0 POLYHYDRAMNIOS: Status: RESOLVED | Noted: 2020-10-15 | Resolved: 2020-12-22

## 2020-12-22 PROBLEM — O99.210 OBESITY IN PREGNANCY: Status: RESOLVED | Noted: 2020-04-29 | Resolved: 2020-12-22

## 2020-12-22 PROBLEM — O34.219 PREVIOUS CESAREAN DELIVERY, ANTEPARTUM: Status: RESOLVED | Noted: 2020-04-29 | Resolved: 2020-12-22

## 2020-12-22 PROCEDURE — 99214 OFFICE O/P EST MOD 30 MIN: CPT | Performed by: NURSE PRACTITIONER

## 2020-12-23 LAB — PLACENTA IN STORAGE: NORMAL

## 2021-01-08 ENCOUNTER — PATIENT OUTREACH (OUTPATIENT)
Dept: OBGYN CLINIC | Facility: CLINIC | Age: 31
End: 2021-01-08

## 2021-01-08 NOTE — PROGRESS NOTES
RN reviewed chart and children charts  Rn called [place] at [phone]  Devin Marrero states that her and the children moved to [facility]   Devin Marrero states she is establishing care for her and her kids  RN provided contact information and aware  I am available if she needs anything   RN called  [facility]  Dominique Krabbe at [phone]  RN  Notified Car Figueroa that RN spoke with Tasneem Lopes and that she moved  RN requested return call  RN will remove patient and her children from my report

## 2021-03-16 ENCOUNTER — PATIENT OUTREACH (OUTPATIENT)
Dept: OBGYN CLINIC | Facility: CLINIC | Age: 31
End: 2021-03-16

## 2023-01-08 ENCOUNTER — HOSPITAL ENCOUNTER (EMERGENCY)
Facility: HOSPITAL | Age: 33
Discharge: HOME | End: 2023-01-08
Attending: EMERGENCY MEDICINE

## 2023-01-08 VITALS
RESPIRATION RATE: 18 BRPM | BODY MASS INDEX: 26.45 KG/M2 | SYSTOLIC BLOOD PRESSURE: 132 MMHG | HEART RATE: 70 BPM | DIASTOLIC BLOOD PRESSURE: 72 MMHG | OXYGEN SATURATION: 98 % | TEMPERATURE: 97.2 F | WEIGHT: 139.99 LBS

## 2023-01-08 DIAGNOSIS — N89.8 VAGINAL DISCHARGE: ICD-10-CM

## 2023-01-08 DIAGNOSIS — A60.00 GENITAL HERPES: Primary | ICD-10-CM

## 2023-01-08 LAB
BACTERIA UR QL AUTO: ABNORMAL /HPF
BILIRUB UR QL STRIP: NEGATIVE
CLARITY UR: CLEAR
COLOR UR: YELLOW
EXT PREGNANCY TEST URINE: NEGATIVE
EXT. CONTROL: NORMAL
GLUCOSE UR STRIP-MCNC: NEGATIVE MG/DL
HGB UR QL STRIP.AUTO: ABNORMAL
KETONES UR STRIP-MCNC: ABNORMAL MG/DL
LEUKOCYTE ESTERASE UR QL STRIP: ABNORMAL
NITRITE UR QL STRIP: NEGATIVE
NON-SQ EPI CELLS URNS QL MICRO: ABNORMAL /HPF
PH UR STRIP.AUTO: 5.5 [PH] (ref 4.5–8)
PROT UR STRIP-MCNC: NEGATIVE MG/DL
RBC #/AREA URNS AUTO: ABNORMAL /HPF
SP GR UR STRIP.AUTO: >=1.03 (ref 1–1.03)
UROBILINOGEN UR QL STRIP.AUTO: 0.2 E.U./DL
WBC #/AREA URNS AUTO: ABNORMAL /HPF

## 2023-01-08 RX ORDER — METRONIDAZOLE 500 MG/1
500 TABLET ORAL EVERY 12 HOURS SCHEDULED
Qty: 14 TABLET | Refills: 0 | Status: SHIPPED | OUTPATIENT
Start: 2023-01-08 | End: 2023-01-15

## 2023-01-08 RX ORDER — VALACYCLOVIR HYDROCHLORIDE 1 G/1
1000 TABLET, FILM COATED ORAL DAILY
Qty: 20 TABLET | Refills: 0 | Status: SHIPPED | OUTPATIENT
Start: 2023-01-08 | End: 2023-01-13

## 2023-01-08 NOTE — DISCHARGE INSTRUCTIONS
Please refer to the attached information for strict return instructions  If symptoms worsen or new symptoms develop please return to the ER  Please follow up with ob/gyn for re-evaluation of symptoms  We will call with further test results if positive

## 2023-01-09 LAB
C GLABRATA DNA VAG QL NAA+PROBE: NEGATIVE
C KRUSEI DNA VAG QL NAA+PROBE: NEGATIVE
C TRACH DNA SPEC QL NAA+PROBE: NEGATIVE
CANDIDA SP 6 PNL VAG NAA+PROBE: NEGATIVE
N GONORRHOEA DNA SPEC QL NAA+PROBE: NEGATIVE
T VAGINALIS DNA VAG QL NAA+PROBE: NEGATIVE
VAGINOSIS/ITIS DNA PNL VAG PROBE+SIG AMP: POSITIVE

## 2023-01-09 NOTE — ED PROVIDER NOTES
History  Chief Complaint   Patient presents with   • Exposure to STD     Pt reports UTI, but at [address] [place] [address] office and noticed a "bump" on vagina  States its the same as when she had herpes 6 months ago, +itching, pain     Pilar Vyas is a 28 yo F presenting with vaginal discharge for the past several days with associated irritation, itching  Describes discharge as "stringy", off white  Does describe slight abnormal odor as well  She reports symptoms feel similar to previous episodes of bacterial vaginosis  No new/unprotected sexual contacts  She also reports single, painful lesion to external genitalia over the past 1-2 days  She reports this lesion is similar to previous episodes of genital herpes  She does note currently have an ob/gyn  She does note some pain with urination, denies urgency/frequency  No pelvic/abdominal pain  History provided by:  Patient   used: No        Prior to Admission Medications   Prescriptions Last Dose Informant Patient Reported? Taking? Blood Glucose Monitoring Suppl (OneTouch Verio Flex System) w/Device KIT   No No   Sig: Dispense 1 kit per insurance formulary  Patient not taking: Reported on 62/51/9158   OneTouch Delica Lancets 32R MISC   No No   Sig: Use 4 a day or as directed     Patient not taking: Reported on 12/22/2020   Prenatal Vit-Fe Fumarate-FA (PRENATAL VITAMIN) 27-0 8 MG TABS   No No   Sig: Take 1 tablet by mouth daily   Patient not taking: Reported on 12/22/2020   docusate sodium (COLACE) 100 mg capsule   No No   Sig: Take 1 capsule (100 mg total) by mouth 2 (two) times a day   famotidine (PEPCID) 20 mg tablet   No No   Sig: Take 1 tablet (20 mg total) by mouth 2 (two) times a day   Patient not taking: Reported on 12/22/2020   ferrous sulfate 324 (65 Fe) mg   No No   Sig: Take 1 tablet (324 mg total) by mouth 2 (two) times a day before meals   Patient not taking: Reported on 12/22/2020      Facility-Administered Medications: None       Past Medical History:   Diagnosis Date   • Anemia    • Chlamydia    • Diabetes mellitus [facility])        Past Surgical History:   Procedure Laterality Date   •  SECTION  , , 2017   • VT  DELIVERY ONLY N/A 12/15/2020    Procedure:  SECTION (); Surgeon: Darius Prince MD;  Location: [facility];  Service: Obstetrics   • TUBAL LIGATION Bilateral 12/15/2020    Procedure: LIGATION/COAGULATION TUBAL;  Surgeon: Darius Prince MD;  Location: [facility];  Service: Obstetrics       Family History   Problem Relation Age of Onset   • Cancer Mother         uterine   • No Known Problems Father    • No Known Problems Sister    • No Known Problems Brother    • Diabetes Maternal Grandmother    • No Known Problems Paternal Grandmother    • No Known Problems Sister    • No Known Problems Brother    • No Known Problems Brother    • No Known Problems Brother    • Cancer Son         renal, Wilms tumor   • Breast cancer Neg Hx      I have reviewed and agree with the history as documented  E-Cigarette/Vaping   • E-Cigarette Use Never User      E-Cigarette/Vaping Substances   • Nicotine No    • THC No    • CBD No    • Flavoring No    • Other No    • Unknown No      Social History     Tobacco Use   • Smoking status: Former     Packs/day: 0 20     Types: Cigarettes     Quit date:      Years since quittin 0   • Smokeless tobacco: Never   Vaping Use   • Vaping Use: Never used   Substance Use Topics   • Alcohol use: Never   • Drug use: Never       Review of Systems   Constitutional: Negative for chills and fever  HENT: Negative for congestion, rhinorrhea and sore throat  Eyes: Negative for pain and visual disturbance  Respiratory: Negative for cough, shortness of breath and wheezing  Cardiovascular: Negative for chest pain and palpitations  Gastrointestinal: Negative for abdominal pain, constipation, diarrhea, nausea and vomiting     Genitourinary: Positive for dysuria, vaginal discharge and vaginal pain  Negative for frequency, urgency and vaginal bleeding  Musculoskeletal: Negative for back pain, neck pain and neck stiffness  Skin: Positive for rash  Negative for wound  Neurological: Negative for dizziness, weakness, light-headedness and numbness  Physical Exam  Physical Exam  Constitutional:       General: She is not in acute distress  Appearance: She is well-developed  She is not diaphoretic  HENT:      Head: Normocephalic and atraumatic  Right Ear: External ear normal       Left Ear: External ear normal    Eyes:      Conjunctiva/sclera: Conjunctivae normal       Pupils: Pupils are equal, round, and reactive to light  Cardiovascular:      Rate and Rhythm: Normal rate and regular rhythm  Heart sounds: Normal heart sounds  No murmur heard  No friction rub  No gallop  Pulmonary:      Effort: Pulmonary effort is normal  No respiratory distress  Breath sounds: Normal breath sounds  No wheezing  Abdominal:      General: There is no distension  Palpations: Abdomen is soft  Tenderness: There is no abdominal tenderness  There is no guarding  Genitourinary:     Comments: External exam performed, female RN present as chaperone  Single herpetic lesion noted to L labia  Musculoskeletal:      Cervical back: Normal range of motion and neck supple  Lymphadenopathy:      Cervical: No cervical adenopathy  Skin:     General: Skin is warm and dry  Capillary Refill: Capillary refill takes less than 2 seconds  Findings: No erythema or rash  Neurological:      Mental Status: She is alert and oriented to person, place, and time  Motor: No abnormal muscle tone  Coordination: Coordination normal    Psychiatric:         Behavior: Behavior normal          Thought Content:  Thought content normal          Judgment: Judgment normal          Vital Signs  ED Triage Vitals   Temperature Pulse Respirations Blood Pressure SpO2   01/08/23 455 3072 01/08/23 1220 01/08/23 1222 01/08/23 1220 01/08/23 1220   (!) 97 2 °F (36 2 °C) 70 18 132/72 98 %      Temp Source Heart Rate Source Patient Position - Orthostatic VS BP Location FiO2 (%)   01/08/23 1222 -- -- -- --   Oral          Pain Score       --                  Vitals:    01/08/23 1220   BP: 132/72   Pulse: 70         Visual Acuity      ED Medications  Medications - No data to display    Diagnostic Studies  Results Reviewed     Procedure Component Value Units Date/Time    Molecular Vaginal Panel [[id number]]  (Abnormal) Collected: 01/08/23 1328    Lab Status: Final result Specimen: Genital from Vaginal Updated: 01/09/23 0941     Bacterial Vaginosis Positive     Candida species Negative     Candida glabrata Negative     Farrah krusei Negative     Trichomonas vaginalis Negative    Urine Microscopic [[id number]]  (Abnormal) Collected: 01/08/23 1242    Lab Status: Final result Specimen: Urine, Clean Catch Updated: 01/08/23 1401     RBC, UA None Seen /hpf      WBC, UA 10-20 /hpf      Epithelial Cells Moderate /hpf      Bacteria, UA Moderate /hpf     Urine culture [[id number]] Collected: 01/08/23 1242    Lab Status: In process Specimen: Urine, Clean Catch Updated: 01/08/23 1401    Chlamydia/GC amplified DNA by PCR [[id number]] Collected: 01/08/23 1245    Lab Status:  In process Specimen: Urine, Other Updated: 01/08/23 1249    POCT pregnancy, urine [[id number]]  (Normal) Resulted: 01/08/23 1246    Lab Status: Final result Updated: 01/08/23 1247     EXT Preg Test, Ur Negative     Control Valid    Urine Macroscopic, POC [[id number]]  (Abnormal) Collected: 01/08/23 1242    Lab Status: Final result Specimen: Urine Updated: 01/08/23 1243     Color, UA Yellow     Clarity, UA Clear     pH, UA 5 5     Leukocytes, UA Trace     Nitrite, UA Negative     Protein, UA Negative mg/dl      Glucose, UA Negative mg/dl      Ketones, UA 15 (1+) mg/dl      Urobilinogen, UA 0 2 E U /dl      Bilirubin, UA Negative     Occult Blood, UA Small     Specific Gravity, UA >=1 030    Narrative:      CLINITEK RESULT                 No orders to display              Procedures  Procedures         ED Course                                             Medical Decision Making  Single herpetic lesion over the past 1-2 days, similar to previous episodes of genital herpes per patient  Will treat with valtrex to prevent worsening/expedite improvement  F/u with ob/gyn for same recommended  Vaginal discharge, odor similar to previous episodes of BV per patient  Also noted is vaginal irritation and itching  No concern reported for new STD exposure  Some dysuria, no other urinary symptoms or pelvic/abdominal pain  Urine micro with 10-20 WBC's, however UTI felt less likely at this time clinically  Will await urine culture prior to treatment for UTI  Plan for course of flagyl for suspected BV  Affirm swab obtained, pending at time of discharge  Follow up/return indications discussed  Genital herpes: complicated acute illness or injury  Vaginal discharge: complicated acute illness or injury  Amount and/or Complexity of Data Reviewed  Labs: ordered  Risk  Prescription drug management            Disposition  Final diagnoses:   Genital herpes   Vaginal discharge     Time reflects when diagnosis was documented in both MDM as applicable and the Disposition within this note     Time User Action Codes Description Comment    1/8/2023  1:41 PM Rasheeda Corky Add [B02 8] Herpes zoster lesion     1/8/2023  1:41 PM Rasheeda Corky Remove [B02 8] Herpes zoster lesion     1/8/2023  1:41 PM Rasheeda Corky Add [B02 8] Herpes zoster lesion     1/8/2023  1:41 PM Rasheeda Corky Remove [B02 8] Herpes zoster lesion     1/8/2023  1:42 PM Rasheeda Corky Add [A60 00] Genital herpes     1/8/2023  1:42 PM Rasheeda Corky Add [N89 8] Vaginal discharge       ED Disposition     ED Disposition   Discharge    Condition   Stable    Date/Time   Sun Jan 8, 2023  1:34 PM Comment   Lacretia Porch discharge to home/self care                 Follow-up Information     Follow up With Specialties Details Why [address]  Emergency Department Emergency Medicine  If symptoms worsen [facility] [id number]  [address] [facility] Emergency Department, [address] [place] [address] Ave [place], [place], [address] [facility] Obstetrics and Gynecology Schedule an appointment as soon as possible for a visit   [address] [facility] [address] [facility] [address]  [address], [address], [address] [place], [place], [address] [phone]          Discharge Medication List as of 1/8/2023  1:47 PM      START taking these medications    Details   metroNIDAZOLE (FLAGYL) 500 mg tablet Take 1 tablet (500 mg total) by mouth every 12 (twelve) hours for 7 days, Starting Sun 1/8/2023, Until Sun 1/15/2023, Normal      valACYclovir (VALTREX) 1,000 mg tablet Take 1 tablet (1,000 mg total) by mouth daily for 5 days, Starting Sun 1/8/2023, Until Fri 1/13/2023, Normal         CONTINUE these medications which have NOT CHANGED    Details   Blood Glucose Monitoring Suppl (OneTouch Verio Flex System) w/Device KIT Dispense 1 kit per insurance formulary , Normal      docusate sodium (COLACE) 100 mg capsule Take 1 capsule (100 mg total) by mouth 2 (two) times a day, Starting Fri 12/11/2020, Normal      famotidine (PEPCID) 20 mg tablet Take 1 tablet (20 mg total) by mouth 2 (two) times a day, Starting Mon 11/9/2020, Normal      ferrous sulfate 324 (65 Fe) mg Take 1 tablet (324 mg total) by mouth 2 (two) times a day before meals, Starting Wed 8/19/2020, Normal      OneTouch Delica Lancets 49H MISC Use 4 a day or as directed , Normal      Prenatal Vit-Fe Fumarate-FA (PRENATAL VITAMIN) 27-0 8 MG TABS Take 1 tablet by mouth daily, Starting Wed 4/29/2020, Normal                 PDMP Review       Value Time User    PDMP Reviewed  Yes 10/9/2019 10:26 AM Néstor Nuno PA-C          ED Provider  Electronically Signed by           Vishal Boogie PA-C  01/09/23 [id number]

## 2023-01-10 LAB
BACTERIA UR CULT: ABNORMAL
BACTERIA UR CULT: ABNORMAL

## (undated) DEVICE — SUT MONOCRYL 4-0 PS-2 27 IN Y426H

## (undated) DEVICE — SCD SEQUENTIAL COMPRESSION COMFORT SLEEVE MEDIUM KNEE LENGTH: Brand: KENDALL SCD

## (undated) DEVICE — PACK C-SECTION PBDS

## (undated) DEVICE — ABG MICROSTICKS SAFETY

## (undated) DEVICE — CHLORAPREP HI-LITE 26ML ORANGE

## (undated) DEVICE — GLOVE SRG BIOGEL ECLIPSE 7

## (undated) DEVICE — ADHESIVE SKIN HIGH VISCOSITY EXOFIN 1ML

## (undated) DEVICE — GLOVE INDICATOR PI UNDERGLOVE SZ 7.5 BLUE

## (undated) DEVICE — SUT VICRYL 0 CT 36 IN J958H

## (undated) DEVICE — SUT PLAIN 3-0 CT-1 27 IN 842H